# Patient Record
Sex: FEMALE | Race: BLACK OR AFRICAN AMERICAN | NOT HISPANIC OR LATINO | Employment: FULL TIME | URBAN - METROPOLITAN AREA
[De-identification: names, ages, dates, MRNs, and addresses within clinical notes are randomized per-mention and may not be internally consistent; named-entity substitution may affect disease eponyms.]

---

## 2017-01-02 ENCOUNTER — PATIENT MESSAGE (OUTPATIENT)
Dept: INTERNAL MEDICINE | Facility: CLINIC | Age: 42
End: 2017-01-02

## 2017-01-02 ENCOUNTER — PATIENT MESSAGE (OUTPATIENT)
Dept: OBSTETRICS AND GYNECOLOGY | Facility: CLINIC | Age: 42
End: 2017-01-02

## 2017-01-25 ENCOUNTER — PATIENT MESSAGE (OUTPATIENT)
Dept: GASTROENTEROLOGY | Facility: CLINIC | Age: 42
End: 2017-01-25

## 2017-01-25 ENCOUNTER — PATIENT MESSAGE (OUTPATIENT)
Dept: INTERNAL MEDICINE | Facility: CLINIC | Age: 42
End: 2017-01-25

## 2017-01-26 ENCOUNTER — PATIENT MESSAGE (OUTPATIENT)
Dept: GASTROENTEROLOGY | Facility: CLINIC | Age: 42
End: 2017-01-26

## 2017-01-26 DIAGNOSIS — K59.09 CHRONIC CONSTIPATION: ICD-10-CM

## 2017-01-26 RX ORDER — METFORMIN HYDROCHLORIDE 750 MG/1
750 TABLET, EXTENDED RELEASE ORAL 2 TIMES DAILY WITH MEALS
Qty: 180 TABLET | Refills: 3 | Status: SHIPPED | OUTPATIENT
Start: 2017-01-26 | End: 2022-05-18

## 2017-01-26 RX ORDER — LEVOTHYROXINE SODIUM 100 UG/1
100 TABLET ORAL DAILY
Qty: 90 TABLET | Refills: 1 | Status: SHIPPED | OUTPATIENT
Start: 2017-01-26 | End: 2023-03-23

## 2018-06-22 DIAGNOSIS — Z12.39 BREAST CANCER SCREENING: ICD-10-CM

## 2019-06-22 ENCOUNTER — TRANSCRIPTION ENCOUNTER (OUTPATIENT)
Age: 44
End: 2019-06-22

## 2019-06-29 ENCOUNTER — TRANSCRIPTION ENCOUNTER (OUTPATIENT)
Age: 44
End: 2019-06-29

## 2020-12-17 LAB
HUMAN PAPILLOMAVIRUS (HPV): NORMAL
PAP RECOMMENDATION EXT: NORMAL
PAP SMEAR: NORMAL

## 2021-04-28 ENCOUNTER — PATIENT MESSAGE (OUTPATIENT)
Dept: RESEARCH | Facility: HOSPITAL | Age: 46
End: 2021-04-28

## 2021-07-26 ENCOUNTER — HISTORICAL (OUTPATIENT)
Dept: ADMINISTRATIVE | Facility: HOSPITAL | Age: 46
End: 2021-07-26

## 2021-09-30 ENCOUNTER — HISTORICAL (OUTPATIENT)
Dept: LAB | Facility: HOSPITAL | Age: 46
End: 2021-09-30

## 2021-09-30 LAB
ABS NEUT (OLG): 1.91 X10(3)/MCL (ref 2.1–9.2)
ALBUMIN SERPL-MCNC: 3.9 GM/DL (ref 3.5–5)
ALBUMIN/GLOB SERPL: 1.2 RATIO (ref 1.1–2)
ALP SERPL-CCNC: 48 UNIT/L (ref 40–150)
ALT SERPL-CCNC: 33 UNIT/L (ref 0–55)
APPEARANCE, UA: ABNORMAL
AST SERPL-CCNC: 18 UNIT/L (ref 5–34)
BACTERIA SPEC CULT: ABNORMAL /HPF
BASOPHILS # BLD AUTO: 0.03 X10(3)/MCL (ref 0–0.2)
BASOPHILS NFR BLD AUTO: 0.6 % (ref 0–1)
BILIRUB SERPL-MCNC: 1.2 MG/DL (ref 0.2–1.2)
BILIRUB UR QL STRIP: NEGATIVE
BILIRUBIN DIRECT+TOT PNL SERPL-MCNC: 0.3 MG/DL (ref 0–0.5)
BILIRUBIN DIRECT+TOT PNL SERPL-MCNC: 0.9 MG/DL (ref 0–0.8)
BUN SERPL-MCNC: 8 MG/DL (ref 7–18.7)
CALCIUM SERPL-MCNC: 9.4 MG/DL (ref 8.4–10.2)
CHLORIDE SERPL-SCNC: 104 MMOL/L (ref 98–107)
CO2 SERPL-SCNC: 25 MMOL/L (ref 22–29)
COLOR UR: YELLOW
CREAT SERPL-MCNC: 0.85 MG/DL (ref 0.57–1.11)
EOSINOPHIL # BLD AUTO: 0.2 X10(3)/MCL (ref 0–0.9)
EOSINOPHIL NFR BLD AUTO: 4 % (ref 0–6.4)
ERYTHROCYTE [DISTWIDTH] IN BLOOD BY AUTOMATED COUNT: 13.1 % (ref 11.5–17)
EST. AVERAGE GLUCOSE BLD GHB EST-MCNC: 122.6 MG/DL
GLOBULIN SER-MCNC: 3.2 GM/DL (ref 2.4–3.5)
GLUCOSE (UA): NEGATIVE
GLUCOSE SERPL-MCNC: 108 MG/DL (ref 74–100)
HBA1C MFR BLD: 5.9 %
HCT VFR BLD AUTO: 41.9 % (ref 37–47)
HGB BLD-MCNC: 13.4 GM/DL (ref 12–16)
HGB UR QL STRIP: ABNORMAL
IMM GRANULOCYTES # BLD AUTO: 0.01 10*3/UL (ref 0–0.02)
IMM GRANULOCYTES NFR BLD AUTO: 0.2 % (ref 0–0.43)
KETONES UR QL STRIP: NEGATIVE
LEUKOCYTE ESTERASE UR QL STRIP: ABNORMAL
LYMPHOCYTES # BLD AUTO: 2.49 X10(3)/MCL (ref 0.6–4.6)
LYMPHOCYTES NFR BLD AUTO: 49.9 % (ref 16–44)
MCH RBC QN AUTO: 28.8 PG (ref 27–31)
MCHC RBC AUTO-ENTMCNC: 32 GM/DL (ref 33–36)
MCV RBC AUTO: 89.9 FL (ref 80–94)
MONOCYTES # BLD AUTO: 0.35 X10(3)/MCL (ref 0.1–1.3)
MONOCYTES NFR BLD AUTO: 7 % (ref 4–12.1)
MRSA SCREEN BY PCR: NEGATIVE
NEUTROPHILS # BLD AUTO: 1.91 X10(3)/MCL (ref 2.1–9.2)
NEUTROPHILS NFR BLD AUTO: 38.3 % (ref 43–73)
NITRITE UR QL STRIP: NEGATIVE
NRBC BLD AUTO-RTO: 0 % (ref 0–0.2)
PH UR STRIP: 7 [PH] (ref 5–7)
PLATELET # BLD AUTO: 297 X10(3)/MCL (ref 130–400)
PMV BLD AUTO: 10.7 FL (ref 7.4–10.4)
POTASSIUM SERPL-SCNC: 3.9 MMOL/L (ref 3.5–5.1)
PROT SERPL-MCNC: 7.1 GM/DL (ref 6.4–8.3)
PROT UR QL STRIP: NEGATIVE
RBC # BLD AUTO: 4.66 X10(6)/MCL (ref 4.2–5.4)
RBC #/AREA URNS HPF: ABNORMAL /HPF
SODIUM SERPL-SCNC: 139 MMOL/L (ref 136–145)
SP GR UR STRIP: <=1.005 (ref 1–1.03)
SQUAMOUS EPITHELIAL, UA: ABNORMAL /LPF
UROBILINOGEN UR STRIP-ACNC: NEGATIVE
WBC # SPEC AUTO: 5 X10(3)/MCL (ref 4.5–11.5)
WBC #/AREA URNS HPF: ABNORMAL /HPF

## 2021-10-02 LAB — FINAL CULTURE: NORMAL

## 2021-10-12 ENCOUNTER — HISTORICAL (OUTPATIENT)
Dept: ADMINISTRATIVE | Facility: HOSPITAL | Age: 46
End: 2021-10-12

## 2021-10-25 ENCOUNTER — HISTORICAL (OUTPATIENT)
Dept: LAB | Facility: HOSPITAL | Age: 46
End: 2021-10-25

## 2021-10-25 LAB — SARS-COV-2 AG RESP QL IA.RAPID: NEGATIVE

## 2021-10-26 ENCOUNTER — HOSPITAL ENCOUNTER (OUTPATIENT)
Dept: ORTHOPEDICS | Facility: HOSPITAL | Age: 46
End: 2021-10-27
Attending: SPECIALIST | Admitting: SPECIALIST

## 2021-10-26 LAB
HCT VFR BLD AUTO: 38 % (ref 37–47)
HGB BLD-MCNC: 12.1 GM/DL (ref 12–16)
POC BETA-HCG (QUAL): NEGATIVE

## 2021-10-27 LAB
ABS NEUT (OLG): 3.71 X10(3)/MCL (ref 2.1–9.2)
BUN SERPL-MCNC: 5.4 MG/DL (ref 7–18.7)
CALCIUM SERPL-MCNC: 8.5 MG/DL (ref 8.4–10.2)
CHLORIDE SERPL-SCNC: 108 MMOL/L (ref 98–107)
CO2 SERPL-SCNC: 25 MMOL/L (ref 22–29)
CREAT SERPL-MCNC: 0.82 MG/DL (ref 0.57–1.11)
CREAT/UREA NIT SERPL: 7
ERYTHROCYTE [DISTWIDTH] IN BLOOD BY AUTOMATED COUNT: 13.5 % (ref 11.5–17)
EST CREAT CLEARANCE SER (OHS): 78.62 ML/MIN
GLUCOSE SERPL-MCNC: 112 MG/DL (ref 74–100)
HCT VFR BLD AUTO: 36.9 % (ref 37–47)
HGB BLD-MCNC: 11.4 GM/DL (ref 12–16)
MCH RBC QN AUTO: 27.9 PG (ref 27–31)
MCHC RBC AUTO-ENTMCNC: 30.9 GM/DL (ref 33–36)
MCV RBC AUTO: 90.2 FL (ref 80–94)
NRBC BLD AUTO-RTO: 0 % (ref 0–0.2)
PLATELET # BLD AUTO: 266 X10(3)/MCL (ref 130–400)
PMV BLD AUTO: 10.2 FL (ref 7.4–10.4)
POTASSIUM SERPL-SCNC: 4.1 MMOL/L (ref 3.5–5.1)
RBC # BLD AUTO: 4.09 X10(6)/MCL (ref 4.2–5.4)
SODIUM SERPL-SCNC: 141 MMOL/L (ref 136–145)
WBC # SPEC AUTO: 6.5 X10(3)/MCL (ref 4.5–11.5)

## 2021-11-08 ENCOUNTER — HISTORICAL (OUTPATIENT)
Dept: ADMINISTRATIVE | Facility: HOSPITAL | Age: 46
End: 2021-11-08

## 2022-01-13 LAB — BCS RECOMMENDATION EXT: NORMAL

## 2022-03-02 ENCOUNTER — HISTORICAL (OUTPATIENT)
Dept: ADMINISTRATIVE | Facility: HOSPITAL | Age: 47
End: 2022-03-02

## 2022-03-02 LAB
T4 FREE SERPL-MCNC: 1.06 NG/DL (ref 0.7–1.48)
TSH SERPL-ACNC: 0.2 M[IU]/L (ref 0.35–4.94)

## 2022-03-11 LAB — CRC RECOMMENDATION EXT: NORMAL

## 2022-04-09 ENCOUNTER — HISTORICAL (OUTPATIENT)
Dept: ADMINISTRATIVE | Facility: HOSPITAL | Age: 47
End: 2022-04-09
Payer: COMMERCIAL

## 2022-04-21 ENCOUNTER — HISTORICAL (OUTPATIENT)
Dept: ADMINISTRATIVE | Facility: HOSPITAL | Age: 47
End: 2022-04-21
Payer: COMMERCIAL

## 2022-04-21 LAB
ABS NEUT (OLG): 2.1 (ref 2.1–9.2)
ALBUMIN SERPL-MCNC: 4.1 G/DL (ref 3.5–5)
ALBUMIN/GLOB SERPL: 1.3 {RATIO} (ref 1.1–2)
ALP SERPL-CCNC: 51 U/L (ref 40–150)
ALT SERPL-CCNC: 19 U/L (ref 0–55)
APPEARANCE, UA: NORMAL
AST SERPL-CCNC: 15 U/L (ref 5–34)
BACTERIA SPEC CULT: NORMAL
BASOPHILS # BLD AUTO: 0.05 10*3/UL (ref 0–0.2)
BASOPHILS NFR BLD AUTO: 0.9 % (ref 0–1)
BILIRUB SERPL-MCNC: 0.8 MG/DL (ref 0.2–1.2)
BILIRUB UR QL STRIP.AUTO: NEGATIVE
BILIRUB UR QL STRIP: NEGATIVE
BILIRUBIN DIRECT+TOT PNL SERPL-MCNC: 0.2 (ref 0–0.5)
BILIRUBIN DIRECT+TOT PNL SERPL-MCNC: 0.6 (ref 0–0.8)
BUN SERPL-MCNC: 13.4 MG/DL (ref 7–18.7)
CALCIUM SERPL-MCNC: 9.2 MG/DL (ref 8.4–10.2)
CHLORIDE SERPL-SCNC: 108 MMOL/L (ref 98–107)
CHOLEST SERPL-MCNC: 250 MG/DL
CHOLEST/HDLC SERPL: 5 {RATIO} (ref 0–5)
CO2 SERPL-SCNC: 25 MMOL/L (ref 22–29)
COLOR UR: YELLOW
CREAT SERPL-MCNC: 0.91 MG/DL (ref 0.57–1.11)
DO MICRO?: YES
EOSINOPHIL # BLD AUTO: 0.22 10*3/UL (ref 0–0.9)
EOSINOPHIL NFR BLD AUTO: 3.9 % (ref 0–6.4)
ERYTHROCYTE [DISTWIDTH] IN BLOOD BY AUTOMATED COUNT: 13.3 % (ref 11.5–17)
EST. AVERAGE GLUCOSE BLD GHB EST-MCNC: 125.5 MG/DL
GLOBULIN SER-MCNC: 3.1 G/DL (ref 2.4–3.5)
GLUCOSE (UA): NEGATIVE
GLUCOSE SERPL-MCNC: 111 MG/DL (ref 74–100)
GLUCOSE UR QL STRIP.AUTO: NEGATIVE
HBA1C MFR BLD: 6 %
HCT VFR BLD AUTO: 41.5 % (ref 37–47)
HDLC SERPL-MCNC: 48 MG/DL (ref 40–60)
HEMOLYSIS INTERF INDEX SERPL-ACNC: 1
HGB BLD-MCNC: 13.2 G/DL (ref 12–16)
HGB UR QL STRIP: NORMAL
ICTERIC INTERF INDEX SERPL-ACNC: 1
IMM GRANULOCYTES # BLD AUTO: 0.01 10*3/UL (ref 0–0.02)
IMM GRANULOCYTES NFR BLD AUTO: 0.2 % (ref 0–0.43)
KETONES UR QL STRIP.AUTO: NEGATIVE
KETONES UR QL STRIP: NEGATIVE
LDLC SERPL CALC-MCNC: 188 MG/DL (ref 50–140)
LEUKOCYTE ESTERASE UR QL STRIP.AUTO: NORMAL
LEUKOCYTE ESTERASE UR QL STRIP: NORMAL
LIPEMIC INTERF INDEX SERPL-ACNC: 11
LYMPHOCYTES # BLD AUTO: 2.82 10*3/UL (ref 0.6–4.6)
LYMPHOCYTES NFR BLD AUTO: 50.6 % (ref 16–44)
MANUAL DIFF? (OHS): NO
MCH RBC QN AUTO: 28.2 PG (ref 27–31)
MCHC RBC AUTO-ENTMCNC: 31.8 G/DL (ref 33–36)
MCV RBC AUTO: 88.7 FL (ref 80–94)
MONOCYTES # BLD AUTO: 0.37 10*3/UL (ref 0.1–1.3)
MONOCYTES NFR BLD AUTO: 6.6 % (ref 4–12.1)
MUCOUS THREADS URNS QL MICRO: NORMAL
NEUTROPHILS # BLD AUTO: 2.1 10*3/UL (ref 2.1–9.2)
NEUTROPHILS NFR BLD AUTO: 37.8 % (ref 43–73)
NITRITE UR QL STRIP: NEGATIVE
NRBC BLD AUTO-RTO: 0 % (ref 0–0.2)
PH UR STRIP: 6 [PH] (ref 5–7)
PLATELET # BLD AUTO: 320 10*3/UL (ref 130–400)
PMV BLD AUTO: 10 FL (ref 7.4–10.4)
POTASSIUM SERPL-SCNC: 4 MMOL/L (ref 3.5–5.1)
PROT SERPL-MCNC: 7.2 G/DL (ref 6.4–8.3)
PROT UR QL STRIP.AUTO: NEGATIVE
PROT UR QL STRIP: NEGATIVE
RBC # BLD AUTO: 4.68 10*6/UL (ref 4.2–5.4)
RBC #/AREA URNS HPF: NORMAL /[HPF] (ref 2–5)
RBC UR QL AUTO: NORMAL
SODIUM SERPL-SCNC: 140 MMOL/L (ref 136–145)
SP GR UR STRIP: >=1.03 (ref 1–1.03)
SQUAMOUS EPITHELIAL, UA: NORMAL
T4 FREE SERPL-MCNC: 0.97 NG/DL (ref 0.7–1.48)
TRIGL SERPL-MCNC: 71 MG/DL (ref 0–150)
TSH SERPL-ACNC: 0.27 M[IU]/L (ref 0.35–4.94)
UROBILINOGEN UR STRIP-ACNC: 0.2
UROBILINOGEN UR STRIP-ACNC: NEGATIVE
VLDLC SERPL CALC-MCNC: 14 MG/DL
WBC # SPEC AUTO: 5.6 10*3/UL (ref 4.5–11.5)
WBC #/AREA URNS HPF: NORMAL /[HPF] (ref 2–5)

## 2022-04-27 VITALS
SYSTOLIC BLOOD PRESSURE: 139 MMHG | DIASTOLIC BLOOD PRESSURE: 89 MMHG | BODY MASS INDEX: 37.88 KG/M2 | HEIGHT: 66 IN | WEIGHT: 235.69 LBS

## 2022-04-30 NOTE — DISCHARGE SUMMARY
Patient:   Maria E Lopez             MRN: [id number]            FIN: [id number]               Age:   46 years     Sex:  Female     :  1975   Associated Diagnoses:   Hashimoto thyroiditis; Hypothyroidism; Impaired gait and mobility; Osteoarthritis of right knee   Author:   Conrad Pope      Discharge Information      Discharge Summary Information   Discharged  10/27/2021   Admitting physician     Jorge A Kingsley MD.     Discharge diagnosis     Hashimoto thyroiditis (HQD95-NA E06.3).     Hypothyroidism (SYM32-QA E03.9).     Impaired gait and mobility (FFI11-BP R26.89).     Osteoarthritis of right knee (AKL80-FZ M17.11).     Discharge medications     OTHER MEDICATIONS (Selected)   Inpatient Medications  Completed  Ancef 2gm/50ml D5W (Premix): 2 gm, form: Infusion Surgical prophylaxis, IV Piggyback, On Call, Infuse over: 30 minute(s), first dose 10/26/21 5:19:00 CDT, if patient > or = 80kg give 2 gm dose OR If patient > or = 120kg give 3 gm dose.  Ancef 2gm/50ml D5W (Premix): 2 gm, form: Infusion, Surgical prophylaxis, IV Piggyback, q6hr, Infuse over: 30 minute(s), Order duration: 3 dose(s), first dose 10/26/21 14:00:00 CDT, stop date 10/27/21 7:59:00 CDT, Last dose within 24 hours after surgery.  Neurontin 300 mg oral capsule: 300 mg, form: Cap, Oral, Once, first dose 10/27/21 9:00:00 CDT, stop date 10/27/21 9:00:00 CDT  Neurontin 300 mg oral capsule: 600 mg, form: Cap, Oral, On Call, Order duration: 1 dose(s), first dose 10/26/21 5:19:00 CDT  Ofirmev 10 mg/mL intravenous solution: 1,000 mg, form: Infusion, IV Piggyback, q6hr, Infuse over: 15 minute(s), Order duration: 1 dose(s), first dose 10/26/21 13:00:00 CDT, stop date 10/26/21 18:59:00 CDT, Post op  Reglan: 10 mg, form: Injection, IV, Once, first dose 10/27/21 11:03:00 CDT, stop date 10/27/21 11:03:00 CDT, NOW  Toradol 10 mg oral tablet: 10 mg, form: Tab, Oral, On Call, Order duration: 1 dose(s), first dose 10/26/21 5:19:00 CDT, if Celebrex is  contraindicated (Sulfa allergy)  Toradol: 15 mg, form: Injection, IV Push, q6hr, first dose 10/26/21 11:00:00 CDT, stop date 10/27/21 14:59:00 CDT  Zofran ORAL tab: 4 mg, form: Tab-Dis, Oral, On Call, Order duration: 1 dose(s), first dose 10/26/21 6:19:00 CDT  acetaminophen: 1,000 mg, form: Tab, Oral, On Call, Order duration: 1 dose(s), first dose 10/26/21 5:19:00 CDT, 1 hr before surgery  Discontinued  Buffered Lidocaine 1% - 1mL syringe: 0.5 mL, 5 mg =, form: Injection, ID, As Directed PRN for other (see comment), first dose 10/26/21 5:23:00 CDT, May inject 0.5mL at IV site, if not allergic  Dextrose 50% and Water  (50 mL vial/syringe): 25 mL, 12.5 gm =, form: Injection, IV Push, As Directed PRN for blood glucose, Infuse over: 5 minute(s), first dose 10/26/21 8:11:00 CDT, Unconscious patient: Repeat as ordered per protocol.  Dextrose 50% and Water  (50 mL vial/syringe): 25 mL, 12.5 gm =, form: Injection, IV Push, Once PRN for blood glucose, Infuse over: 5 minute(s), first dose 10/26/21 8:11:00 CDT, Unconscious patient: Look for other source of altered mental status.  Dextrose 50% and Water  (50 mL vial/syringe): 50 mL, 25 gm =, form: Injection, IV Push, As Directed PRN for blood glucose, Infuse over: 5 minute(s), first dose 10/26/21 8:11:00 CDT, Unconscious patient: Repeat as ordered per protocol.  Dextrose 50% in Water intravenous solution: 25 mL, 12.5 gm =, form: Injection, IV Push, As Directed PRN for blood glucose, Infuse over: 5 minute(s), first dose 10/26/21 8:11:00 CDT, Conscious patient.  Dilaudid: 0.5 mg, form: Injection, IV Push, q5min PRN for pain, Order duration: 8 dose(s), first dose 10/26/21 8:13:00 CDT, stop date Limited # of times, moderate/severe.  May repeat every 5 minutes until patient reaches pain level of mild or less, not to excee...  Dulcolax Laxative 10 mg RECTAL suppository: 10 mg, form: Supp, CT (rectal), Daily PRN for constipation, first dose 10/26/21 8:11:00 CDT, give in unrelieved  with MOM and Miralax  Milk of Magnesia: 30 mL, form: Susp, Oral, Daily PRN for constipation, first dose 10/26/21 8:11:00 CDT  MiraLax (polyethylene glycol 3350): 17 gm, form: Powder-Recon, Oral, Daily, first dose 10/26/21 9:00:00 CDT  Pepcid: 20 mg, form: Tab, Oral, Daily, first dose 10/26/21 9:00:00 CDT  Percocet 5/325: 1 tab(s), form: Tab, Oral, q4hr PRN for pain, first dose 10/26/21 8:11:00 CDT, Pain score 6-10  Phenergan: 6.25 mg, form: Injection, IM, Once PRN for nausea, first dose 10/26/21 8:13:00 CDT, Do NOT give if patient > 60 years old, pregnant, or lactacting  Plasmalyte 1,000 mL: 1,000 mL, 1,000 mL, IV, Once-Unscheduled, 250 mL/hr, start date 10/26/21 5:19:00 CDT, 2.15, m2  Restoril 15 mg oral capsule: 15 mg, form: Cap, Oral, At Bedtime PRN for insomnia, first dose 10/26/21 8:11:00 CDT  Robaxin 750 mg oral tablet: 750 mg, form: Tab, Oral, q8hr PRN for spasm, first dose 10/26/21 8:11:00 CDT  Senokot S 50 mg-8.6 mg oral tablet: 2 tab(s), form: Tab, Oral, BID, first dose 10/26/21 9:00:00 CDT  Sodium Chloride 0.9% intravenous solution 1,000 mL: 1,000 mL, 1,000 mL, IV, 125 mL/hr, start date 10/26/21 8:11:00 CDT, 2.12, m2  Sodium Chloride 0.9% intravenous solution 1,000 mL: 1,000 mL, 1,000 mL, IV, 20 mL/hr, start date 10/26/21 5:23:00 CDT, 2.15, m2  Sodium Chloride 0.9% intravenous solution 1,000 mL: 1,000 mL, 1,000 mL, IV, 20 mL/hr, start date 10/26/21 8:13:00 CDT, 2.12, m2  Ultram: 50 mg, form: Tab, Oral, q4hr PRN for pain, first dose 10/26/21 8:11:00 CDT, Pain Score 1-5  Zofran ODT 4 mg oral tablet, disintegratin mg, form: Tab-Dis, Oral, q6hr PRN for nausea/vomiting, first dose 10/26/21 8:11:00 CDT, if tolerating PO intake  Zofran: 4 mg, form: Injection, IV Push, Once-Unscheduled PRN for nausea, first dose 10/26/21 8:13:00 CDT  Zofran: 4 mg, form: Injection, IV Push, q6hr PRN for nausea/vomiting, first dose 10/26/21 8:11:00 CDT, If unable to tolerate PO  ascorbic acid: 500 mg, form: Tab, Oral, At  Bedtime, first dose 10/26/21 21:00:00 CDT  aspirin 81 mg oral Delayed Release (EC) tablet: 81 mg, form: Tab-EC, Oral, BID, first dose 10/27/21 9:00:00 CDT  glucagon: 1 mg, form: Injection, IM, q10min PRN for blood glucose, first dose 10/26/21 8:11:00 CDT, Conscious Patient with NO IV access available and BG < 45 mg/dl.  glucagon: 1 mg, form: Injection, IM, q10min PRN for blood glucose, first dose 10/26/21 8:11:00 CDT, Unconscious patient: Patient with NO IV access available and BG < 70 mg/dl.  hydrALAZINE (Apresoline) Inj.: 10 mg, form: Injection, IV Push, q4hr PRN for hypertension, first dose 10/26/21 8:11:00 CDT, SBP >160, DBP > 90  insulin lispro: 2-14 units, form: Soln, Subcutaneous, As Directed PRN for blood glucose, first dose 10/26/21 8:11:00 CDT  levothyroxine 100 mcg (0.1 mg) oral tablet: 100 mcg, form: Tab, Oral, Daily, first dose 10/27/21 6:00:00 CDT  liothyronine: 5 mcg, form: Tab, Oral, Daily, first dose 10/26/21 9:00:00 CDT  midazolam: 1 mg, form: Injection, IV Push, q5min PRN for anxiety, Order duration: 2 dose(s), first dose 10/26/21 5:23:00 CDT, stop date Limited # of times, may repeat x1 in 5 minutes if anxiety not relieved.  Hold  if pregnancy test is pending. MAX Dose of 5 mg...  morphine: 4 mg, form: Injection, IV Push, q3hr PRN for breakthru pain, first dose 10/26/21 8:11:00 CDT, pain score 7-10 after PO meds  morphine: 4 mg, form: Injection, IV Push, q5min PRN for pain, Order duration: 5 dose(s), first dose 10/26/21 8:13:00 CDT, stop date Limited # of times, moderate/severe; may repeat every 5 minutes until patient reaches pain level or mild or less, not to exceed 1...  simethicone 80 mg Chew Tab ( Mylanta Gas ): 160 mg, form: Tab-Chew, Oral, TID PRN for gas, first dose 10/27/21 11:35:00 CDT, STAT  Prescriptions  Prescribed  Linzess 145 mcg oral capsule: 145 mcg = 1 cap(s), Oral, Daily, PRN PRN constipation, # 30 cap(s), 0 Refill(s), Pharmacy: [facility], 167, cm, Height/Length  Dosing, 10/26/21 5:29:00 CDT, 105.2, kg, Weight Dosing, 10/26/21 5:29:00 CDT  Robaxin 750 mg oral tablet: 750 mg = 1 tab(s), Oral, q6hr, PRN PRN spasm, X 14 day(s), # 56 tab(s), 0 Refill(s), Pharmacy: [facility], 167, cm, Height/Length Dosing, 10/26/21 5:29:00 CDT, 105.2, kg, Weight Dosing, 10/26/21 5:29:00 CDT  Toradol 10 mg oral tablet: 10 mg = 1 tab(s), Oral, q8hr, not to exceed 40 mg/day and 5 days duration for all dose forms, X 5 day(s), # 15 tab(s), 0 Refill(s), Pharmacy: [facility], 167, cm, Height/Length Dosing, 10/26/21 5:29:00 CDT, 105.2, kg, Weight Dosing, 10/26/21...  Ultram 50 mg oral tablet: 50 mg = 1 tab(s), Oral, q4hr, PRN PRN pain, X 7 day(s), # 42 tab(s), 0 Refill(s), Pharmacy: [facility], 167, cm, Height/Length Dosing, 10/26/21 5:29:00 CDT, 105.2, kg, Weight Dosing, 10/26/21 5:29:00 CDT  aspirin 81 mg oral Delayed Release (EC) tablet: 81 mg = 1 tab(s), Oral, BID, # 84 tab(s), 0 Refill(s), Pharmacy: [facility], 167, cm, Height/Length Dosing, 10/26/21 5:29:00 CDT, 105.2, kg, Weight Dosing, 10/26/21 5:29:00 CDT  Discontinued  Linzess 145 mcg oral capsule: 145 mcg = 1 cap(s), Oral, Daily, PRN PRN constipation, # 14 cap(s), 0 Refill(s), Pharmacy: [facility], 167, cm, Height/Length Dosing, 10/26/21 5:29:00 CDT, 105.2, kg, Weight Dosing, 10/26/21 5:29:00 CDT  Documented Medications  Documented  levothyroxine 100 mcg (0.1 mg) oral tablet: 100 mcg = 1 tab(s), Oral, Daily  liothyronine 5 mcg oral tablet: 5 mcg = 1 tab(s), Oral, Daily  polyethylene glycol 3350 oral powder for reconstitution: 17 gm, Oral, BID, 0 Refill(s)  Completed  Tylenol Extra Strength PM: 1 tab(s), Oral, Once a day (at bedtime), 0 Refill(s)  Discontinued  Aleve 220 mg oral capsule: 220 mg = 1 cap(s), Oral, BID, 0 Refill(s).        Hospital Course     Procedure performed: robotic assisted right medial compartment partial knee arthroplasty  The patient had no complications during her  hospital stay and was discharged home in stable condition full weightbearing with assistance of a walker.  The patient was given a consult for physical therapy and rehab as well as a follow-up appointment in our office in 2 weeks for reevaluation.  The patient was instructed on wound care and dressing change as well as to continue the GEOVANNY hose while at home.  Prescriptions for continuation of the VTE prophylaxis and pain control were given.  The patient´s home medications were resumed please see discharge med rec for that.         Discharge Plan   Discharge Summary Plan   Discharge disposition: discharge to home into the care of family member.     Prescriptions: written and given to patient.

## 2022-04-30 NOTE — OP NOTE
DATE OF SURGERY:    10/26/2021    SURGEON:  Jorge A Kingsley MD  ASSISTANT:  TIGRE Polanco    ASSISTANT ATTESTATION:  Conrad Reyes, Certified Physician Assistant, was necessary and essential for all aspects of the operation including patient positioning, surgical exposure, bony preparation, implantation, and wound closure.    COUNTS:  Lap, needle, and sponge counts correct.    COMPLICATIONS:  None.    ESTIMATED BLOOD LOSS:  Less 20 mL.    PREOPERATIVE DIAGNOSIS:  Medial compartment osteoarthritis, right knee.    POSTOPERATIVE DIAGNOSIS:  Medial compartment osteoarthritis, right knee.    PROCEDURE:  Robotic-assisted right knee medial compartment partial knee arthroplasty.    IMPLANTS:  [place] size 4 femoral component, size 4 tibial component, 8 mm thickness polyethylene insert, tobramycin-impregnated cement.    INDICATIONS:  Ms Lopez is 46 years old with advanced osteoarthritis in which she was bone on bone in the medial compartment of her right knee.  She had a passively correctable varus deformity to neutral.  Risks, benefits, alternatives, and complications of operative and nonoperative treatment were explained to the patient.  She understood, agreed, and wanted to proceed with operative intervention.  Valid informed consent was obtained.    DESCRIPTION OF PROCEDURE:  She was brought to the operating room and placed supine on the operating table, and underwent regional anesthesia.  She was sedated.  Tourniquet was placed on the right thigh.  The usual sterile ChloraPrep scrub and paint, followed by sterile drape was performed.  Ioban dressing was placed.  Esmarch bandage was used to exsanguinate the right lower extremity.  Tourniquet was inflated.  Pins were placed into the femur and the tibia followed by assembly and registration of the femoral and tibial arrays.  Hip center and ankle center registration were performed.  Anteromedial incision was made over the medial compartment.  Skin bleeders were  coagulated with cautery.  The capsulotomy was performed and a self-retaining retractor was placed.  The patient was bone on bone in the medial compartment with a normal patellofemoral articulation and a normal lateral compartment.  The ACL was intact.  The medial meniscus was excised.  The femoral and tibial checkpoints were placed, and registered.  Fine point registration of the femur and the tibia was performed followed by excision of osteophytes and ligament balancing.  When the plane was balanced symmetrically, cartilage mapping was performed.  The plan was finalized and robotic-assisted bony preparation of the femur and the tibia was performed.  A size 4 tibial and femoral trial were placed along with an 8 mm thickness trial insert.  The range of motion was 0 degrees to 125 degrees with excellent balancing and tracking throughout a range of motion.  Trials were removed.  Bone surfaces were irrigated and suctioned and dried.  Tobramycin-impregnated cement was mixed.  Once it was proper consistency, the size 4 tibia and femoral component were cemented into place along with press fit of an 8 mm thickness polyethylene insert.  Excess cement was excised when the cement had cured.  Tourniquet was deflated.  Hemostasis was obtained.  There was no abnormal bleeding.  The knee was irrigated, suctioned, and injected for postoperative pain control.  Range of motion was 0 degrees to 125 degrees with excellent balancing and tracking throughout a range of motion.  The capsule and subcutaneous tissue were injected for postoperative pain control.  The knee was irrigated, suctioned, and then arthrotomy was closed with #1 braided suture followed by reapproximation of skin edges with 2-0 Vicryl suture and surgical staples used for skin closure.  Sterile dressings were applied and the patient was taken to the recovery room in stable condition.        ______________________________  MD MELINDA Barnes/UA  DD:  10/26/2021  Time:   07:51AM  DT:  10/26/2021  Time:  08:08AM  Job #:  [id number]

## 2022-05-16 ENCOUNTER — PATIENT MESSAGE (OUTPATIENT)
Dept: FAMILY MEDICINE | Facility: CLINIC | Age: 47
End: 2022-05-16
Payer: COMMERCIAL

## 2022-05-18 ENCOUNTER — OFFICE VISIT (OUTPATIENT)
Dept: FAMILY MEDICINE | Facility: CLINIC | Age: 47
End: 2022-05-18
Payer: COMMERCIAL

## 2022-05-18 VITALS
DIASTOLIC BLOOD PRESSURE: 90 MMHG | WEIGHT: 217 LBS | BODY MASS INDEX: 34.87 KG/M2 | SYSTOLIC BLOOD PRESSURE: 126 MMHG | OXYGEN SATURATION: 98 % | HEART RATE: 72 BPM | RESPIRATION RATE: 14 BRPM | HEIGHT: 66 IN

## 2022-05-18 DIAGNOSIS — R31.29 MICROSCOPIC HEMATURIA: Primary | ICD-10-CM

## 2022-05-18 DIAGNOSIS — R82.71 BACTERIURIA: ICD-10-CM

## 2022-05-18 PROBLEM — R39.9 UTI SYMPTOMS: Status: ACTIVE | Noted: 2022-05-18

## 2022-05-18 LAB
APPEARANCE UR: CLEAR
BACTERIA #/AREA URNS AUTO: ABNORMAL /HPF
BILIRUB UR QL STRIP.AUTO: NEGATIVE MG/DL
COLOR UR AUTO: YELLOW
GLUCOSE UR QL STRIP.AUTO: NEGATIVE MG/DL
KETONES UR QL STRIP.AUTO: ABNORMAL MG/DL
LEUKOCYTE ESTERASE UR QL STRIP.AUTO: ABNORMAL UNIT/L
NITRITE UR QL STRIP.AUTO: NEGATIVE
PH UR STRIP.AUTO: 6.5 [PH]
PROT UR QL STRIP.AUTO: NEGATIVE MG/DL
RBC #/AREA URNS AUTO: <5 /HPF
RBC UR QL AUTO: ABNORMAL UNIT/L
SP GR UR STRIP.AUTO: 1.01 (ref 1–1.03)
SQUAMOUS #/AREA URNS AUTO: 7 /LPF
UROBILINOGEN UR STRIP-ACNC: 1 MG/DL
WBC #/AREA URNS AUTO: 6 /HPF

## 2022-05-18 PROCEDURE — 99213 PR OFFICE/OUTPT VISIT, EST, LEVL III, 20-29 MIN: ICD-10-PCS | Mod: ,,, | Performed by: NURSE PRACTITIONER

## 2022-05-18 PROCEDURE — 3074F SYST BP LT 130 MM HG: CPT | Mod: CPTII,,, | Performed by: NURSE PRACTITIONER

## 2022-05-18 PROCEDURE — 81001 URINALYSIS AUTO W/SCOPE: CPT | Performed by: NURSE PRACTITIONER

## 2022-05-18 PROCEDURE — 3008F PR BODY MASS INDEX (BMI) DOCUMENTED: ICD-10-PCS | Mod: CPTII,,, | Performed by: NURSE PRACTITIONER

## 2022-05-18 PROCEDURE — 1160F PR REVIEW ALL MEDS BY PRESCRIBER/CLIN PHARMACIST DOCUMENTED: ICD-10-PCS | Mod: CPTII,,, | Performed by: NURSE PRACTITIONER

## 2022-05-18 PROCEDURE — 3080F DIAST BP >= 90 MM HG: CPT | Mod: CPTII,,, | Performed by: NURSE PRACTITIONER

## 2022-05-18 PROCEDURE — 1159F MED LIST DOCD IN RCRD: CPT | Mod: CPTII,,, | Performed by: NURSE PRACTITIONER

## 2022-05-18 PROCEDURE — 3074F PR MOST RECENT SYSTOLIC BLOOD PRESSURE < 130 MM HG: ICD-10-PCS | Mod: CPTII,,, | Performed by: NURSE PRACTITIONER

## 2022-05-18 PROCEDURE — 3080F PR MOST RECENT DIASTOLIC BLOOD PRESSURE >= 90 MM HG: ICD-10-PCS | Mod: CPTII,,, | Performed by: NURSE PRACTITIONER

## 2022-05-18 PROCEDURE — 1159F PR MEDICATION LIST DOCUMENTED IN MEDICAL RECORD: ICD-10-PCS | Mod: CPTII,,, | Performed by: NURSE PRACTITIONER

## 2022-05-18 PROCEDURE — 1160F RVW MEDS BY RX/DR IN RCRD: CPT | Mod: CPTII,,, | Performed by: NURSE PRACTITIONER

## 2022-05-18 PROCEDURE — 99213 OFFICE O/P EST LOW 20 MIN: CPT | Mod: ,,, | Performed by: NURSE PRACTITIONER

## 2022-05-18 PROCEDURE — 3008F BODY MASS INDEX DOCD: CPT | Mod: CPTII,,, | Performed by: NURSE PRACTITIONER

## 2022-05-18 RX ORDER — SYRING-NEEDL,DISP,INSUL,0.3 ML 29 G X1/2"
SYRINGE, EMPTY DISPOSABLE MISCELLANEOUS
COMMUNITY
Start: 2022-02-17 | End: 2022-08-12

## 2022-05-18 RX ORDER — AMLODIPINE BESYLATE 5 MG/1
5 TABLET ORAL DAILY
COMMUNITY
Start: 2022-04-28 | End: 2023-03-23

## 2022-05-18 RX ORDER — LIOTHYRONINE SODIUM 5 UG/1
5 TABLET ORAL DAILY
COMMUNITY
Start: 2022-04-01 | End: 2023-03-23

## 2022-05-18 NOTE — ASSESSMENT & PLAN NOTE
Microscopic hematuria noted   POC urine positive blood today  Will repeat UA with microscopy  Will also repeat urine culture  Will consider Urology referral and/or CT pending results

## 2022-05-18 NOTE — PROGRESS NOTES
Subjective:      Patient ID: Maria E Lopez is a 46 y.o. Black or  female      Chief Complaint: Follow-up (Possible UTI)       Past Medical History:   Diagnosis Date    Chronic constipation     Hashimoto's thyroiditis     Hyperlipidemia     Hypertension     Hypothyroidism     Obesity     Osteoarthritis     Prediabetes         HPI  Presents today for discussion of urine results.  States recent wellness visit on 4/28/2022 in which labs were done; she is here today for further explanation of urine results.  Results revealed microscopic hematuria, WBCs, leukocytes, few bacteria, and moderate squamous epithelial cells.  Urine culture was negative.  Denies any urinary symptoms today.  States concerns for cancer after researching urine results.  Patient denies menstrual cycle at time of results and states she does not have menstrual cycles due to IUD.  Denies any other problems today.      Review of Systems   Constitutional: Negative.  Negative for appetite change and fever.   HENT: Negative.    Eyes: Negative.  Negative for discharge and redness.   Respiratory: Negative.  Negative for shortness of breath.    Cardiovascular: Negative.  Negative for chest pain.   Gastrointestinal: Negative.    Endocrine: Negative.    Genitourinary: Negative.  Negative for decreased urine volume, difficulty urinating, dysuria, flank pain, frequency, hematuria and urgency.   Integumentary:  Negative.   Allergic/Immunologic: Negative.    Neurological: Negative.    Psychiatric/Behavioral: Negative.    All other systems reviewed and are negative.         Objective:      Vitals:    05/18/22 1256   BP: (!) 126/90   Pulse: 72   Resp: 14      Body mass index is 35.02 kg/m².     Physical Exam  Vitals reviewed.   Constitutional:       Appearance: Normal appearance.   HENT:      Head: Normocephalic.      Mouth/Throat:      Mouth: Mucous membranes are moist.   Eyes:      Conjunctiva/sclera: Conjunctivae normal.       Pupils: Pupils are equal, round, and reactive to light.   Cardiovascular:      Rate and Rhythm: Normal rate and regular rhythm.   Pulmonary:      Effort: Pulmonary effort is normal. No respiratory distress.      Breath sounds: Normal breath sounds.   Musculoskeletal:         General: Normal range of motion.      Cervical back: Normal range of motion and neck supple.   Skin:     General: Skin is warm and dry.   Neurological:      Mental Status: She is alert and oriented to person, place, and time.   Psychiatric:         Mood and Affect: Mood normal.            Current Outpatient Medications:     amLODIPine (NORVASC) 5 MG tablet, Take 5 mg by mouth once daily., Disp: , Rfl:     levothyroxine (SYNTHROID) 100 MCG tablet, Take 1 tablet (100 mcg total) by mouth once daily., Disp: 90 tablet, Rfl: 1    linaCLOtide (LINZESS) 145 mcg Cap capsule, Take 145 mcg by mouth., Disp: , Rfl:     liothyronine (CYTOMEL) 5 MCG Tab, Take 5 mcg by mouth once daily., Disp: , Rfl:     magnesium citrate solution, SMARTSI Bottle(s) By Mouth Every Evening, Disp: , Rfl:     Assessment & Plan:     Problem List Items Addressed This Visit        Renal/    Bacteriuria     Microscopic hematuria noted   POC urine positive blood today  Will repeat UA with microscopy  Will also repeat urine culture  Will consider Urology referral and/or CT pending results           Relevant Orders    POCT URINE DIPSTICK WITHOUT MICROSCOPE    Urine culture    Urinalysis, Reflex to Urine Culture Urine, Clean Catch    Microscopic hematuria - Primary     Microscopic hematuria noted   POC urine positive blood today  Will repeat UA with microscopy  Will also repeat urine culture  Will consider Urology referral and/or CT pending results           Relevant Orders    Urine culture    Urinalysis, Reflex to Urine Culture Urine, Clean Catch

## 2022-08-12 ENCOUNTER — OFFICE VISIT (OUTPATIENT)
Dept: FAMILY MEDICINE | Facility: CLINIC | Age: 47
End: 2022-08-12
Payer: COMMERCIAL

## 2022-08-12 VITALS
WEIGHT: 210.63 LBS | HEIGHT: 66 IN | TEMPERATURE: 98 F | OXYGEN SATURATION: 98 % | RESPIRATION RATE: 16 BRPM | SYSTOLIC BLOOD PRESSURE: 121 MMHG | BODY MASS INDEX: 33.85 KG/M2 | HEART RATE: 72 BPM | DIASTOLIC BLOOD PRESSURE: 86 MMHG

## 2022-08-12 DIAGNOSIS — H92.01 RIGHT EAR PAIN: Primary | ICD-10-CM

## 2022-08-12 PROCEDURE — 3008F PR BODY MASS INDEX (BMI) DOCUMENTED: ICD-10-PCS | Mod: CPTII,,, | Performed by: NURSE PRACTITIONER

## 2022-08-12 PROCEDURE — 3079F DIAST BP 80-89 MM HG: CPT | Mod: CPTII,,, | Performed by: NURSE PRACTITIONER

## 2022-08-12 PROCEDURE — 1160F RVW MEDS BY RX/DR IN RCRD: CPT | Mod: CPTII,,, | Performed by: NURSE PRACTITIONER

## 2022-08-12 PROCEDURE — 3074F PR MOST RECENT SYSTOLIC BLOOD PRESSURE < 130 MM HG: ICD-10-PCS | Mod: CPTII,,, | Performed by: NURSE PRACTITIONER

## 2022-08-12 PROCEDURE — 1160F PR REVIEW ALL MEDS BY PRESCRIBER/CLIN PHARMACIST DOCUMENTED: ICD-10-PCS | Mod: CPTII,,, | Performed by: NURSE PRACTITIONER

## 2022-08-12 PROCEDURE — 3074F SYST BP LT 130 MM HG: CPT | Mod: CPTII,,, | Performed by: NURSE PRACTITIONER

## 2022-08-12 PROCEDURE — 3079F PR MOST RECENT DIASTOLIC BLOOD PRESSURE 80-89 MM HG: ICD-10-PCS | Mod: CPTII,,, | Performed by: NURSE PRACTITIONER

## 2022-08-12 PROCEDURE — 1159F MED LIST DOCD IN RCRD: CPT | Mod: CPTII,,, | Performed by: NURSE PRACTITIONER

## 2022-08-12 PROCEDURE — 3008F BODY MASS INDEX DOCD: CPT | Mod: CPTII,,, | Performed by: NURSE PRACTITIONER

## 2022-08-12 PROCEDURE — 99213 OFFICE O/P EST LOW 20 MIN: CPT | Mod: ,,, | Performed by: NURSE PRACTITIONER

## 2022-08-12 PROCEDURE — 99213 PR OFFICE/OUTPT VISIT, EST, LEVL III, 20-29 MIN: ICD-10-PCS | Mod: ,,, | Performed by: NURSE PRACTITIONER

## 2022-08-12 PROCEDURE — 1159F PR MEDICATION LIST DOCUMENTED IN MEDICAL RECORD: ICD-10-PCS | Mod: CPTII,,, | Performed by: NURSE PRACTITIONER

## 2022-08-12 NOTE — PROGRESS NOTES
Subjective:      Patient ID: Maria E Lopez is a 46 y.o. Black or  female      Chief Complaint: Follow-up (Constant pain in ears, gurgling sound when cough)       Past Medical History:   Diagnosis Date    Chronic constipation     Hashimoto's thyroiditis     Hyperlipidemia     Hypertension     Hypothyroidism     Obesity     Osteoarthritis     Prediabetes         HPI  Otalgia   There is pain in the right ear. The current episode started more than 1 month ago (Pain since diagnosis of Covid-19 (June 28th)). The problem has been waxing and waning. There has been no fever. Pertinent negatives include no ear discharge or sore throat. Associated symptoms comments: States crackling sound when she coughs. . She has tried ear drops (Treated with Amoxicillin for [employer] last week of June.  ) for the symptoms. The treatment provided no relief.       Review of Systems   Constitutional: Negative.  Negative for appetite change, chills and fever.   HENT: Positive for ear pain. Negative for ear discharge and sore throat.    Eyes: Negative.  Negative for discharge and redness.   Respiratory: Negative.  Negative for shortness of breath.    Cardiovascular: Negative.  Negative for chest pain.   Gastrointestinal: Negative.    Endocrine: Negative.    Genitourinary: Negative.    Integumentary:  Negative.   Neurological: Negative.    Psychiatric/Behavioral: Negative.    All other systems reviewed and are negative.         Objective:      Vitals:    08/12/22 0947   BP: 121/86   Pulse: 72   Resp: 16   Temp: 98.1 °F (36.7 °C)      Body mass index is 33.99 kg/m².     Physical Exam  Vitals reviewed.   Constitutional:       Appearance: Normal appearance.   HENT:      Head: Normocephalic.      Ears:      Comments: Right ear canal noted with, what appears to be hard cerumen, or foreign body     Mouth/Throat:      Mouth: Mucous membranes are moist.   Eyes:      Conjunctiva/sclera: Conjunctivae normal.      Pupils:  Pupils are equal, round, and reactive to light.   Cardiovascular:      Rate and Rhythm: Normal rate and regular rhythm.   Pulmonary:      Effort: Pulmonary effort is normal. No respiratory distress.      Breath sounds: Normal breath sounds.   Musculoskeletal:         General: Normal range of motion.      Cervical back: Normal range of motion and neck supple.   Skin:     General: Skin is warm and dry.   Neurological:      Mental Status: She is alert and oriented to person, place, and time.   Psychiatric:         Mood and Affect: Mood normal.            Current Outpatient Medications:     amLODIPine (NORVASC) 5 MG tablet, Take 5 mg by mouth once daily., Disp: , Rfl:     levothyroxine (SYNTHROID) 100 MCG tablet, Take 1 tablet (100 mcg total) by mouth once daily., Disp: 90 tablet, Rfl: 1    linaCLOtide (LINZESS) 145 mcg Cap capsule, Take 145 mcg by mouth., Disp: , Rfl:     liothyronine (CYTOMEL) 5 MCG Tab, Take 5 mcg by mouth once daily., Disp: , Rfl:     Assessment & Plan:     Problem List Items Addressed This Visit        ENT    Right ear pain - Primary     Cerumen vs foreign body right ear that is likely causing pain  Debrox OTC prn  Refer to ENT           Relevant Orders    Ambulatory referral/consult to ENT               Prior to the patient's arrival on the same day, I spent (5) minutes reviewing chart. Once in the exam room with the patient, I spent (10 ) minutes in the room with the member performing a history and exam as well as reviewing the test results and recommendations with the patient. After leaving the exam room, I spent an additional (5 ) minutes completing the electronic health record. The total time spent that day caring for the member is (20) minutes, and this time - including the breakdown - is documented in the medical record.

## 2022-10-19 ENCOUNTER — TELEPHONE (OUTPATIENT)
Dept: FAMILY MEDICINE | Facility: CLINIC | Age: 47
End: 2022-10-19
Payer: COMMERCIAL

## 2022-10-19 DIAGNOSIS — R73.03 PREDIABETES: ICD-10-CM

## 2022-10-19 DIAGNOSIS — E03.9 HYPOTHYROIDISM, UNSPECIFIED TYPE: ICD-10-CM

## 2022-10-19 DIAGNOSIS — E78.5 HYPERLIPIDEMIA, UNSPECIFIED HYPERLIPIDEMIA TYPE: Primary | ICD-10-CM

## 2022-10-19 NOTE — TELEPHONE ENCOUNTER
----- Message from Adriana Redd sent at 10/19/2022 11:05 AM CDT -----  Regarding: labs  Caller is requesting to schedule their Lab appointment prior to annual appointment.  Order is not listed in EPIC.  Please enter order and contact patient to schedule.    Name of Caller:pt    Preferred Date and Time of Labs:    Date of [facility] Appointment:11/27    Where would they like the lab performed?    Would the patient rather a call back or a response via [email or web address]? [email or web address]    Best Call Back Number:[phone]    Additional Information:Pt has an upcoming appt and she doesn't have the labs in.

## 2022-10-20 ENCOUNTER — LAB VISIT (OUTPATIENT)
Dept: LAB | Facility: HOSPITAL | Age: 47
End: 2022-10-20
Attending: INTERNAL MEDICINE
Payer: COMMERCIAL

## 2022-10-20 DIAGNOSIS — E78.5 HYPERLIPIDEMIA, UNSPECIFIED HYPERLIPIDEMIA TYPE: ICD-10-CM

## 2022-10-20 DIAGNOSIS — R73.03 PREDIABETES: ICD-10-CM

## 2022-10-20 DIAGNOSIS — E03.9 HYPOTHYROIDISM, UNSPECIFIED TYPE: ICD-10-CM

## 2022-10-20 LAB
CHOLEST SERPL-MCNC: 321 MG/DL
CHOLEST/HDLC SERPL: 5 {RATIO} (ref 0–5)
EST. AVERAGE GLUCOSE BLD GHB EST-MCNC: 116.9 MG/DL
HBA1C MFR BLD: 5.7 %
HDLC SERPL-MCNC: 63 MG/DL (ref 35–60)
LDLC SERPL CALC-MCNC: 240 MG/DL (ref 50–140)
T4 FREE SERPL-MCNC: 0.87 NG/DL (ref 0.7–1.48)
TRIGL SERPL-MCNC: 89 MG/DL (ref 37–140)
TSH SERPL-ACNC: 1.14 UIU/ML (ref 0.35–4.94)
VLDLC SERPL CALC-MCNC: 18 MG/DL

## 2022-10-20 PROCEDURE — 83036 HEMOGLOBIN GLYCOSYLATED A1C: CPT

## 2022-10-20 PROCEDURE — 84443 ASSAY THYROID STIM HORMONE: CPT

## 2022-10-20 PROCEDURE — 36415 COLL VENOUS BLD VENIPUNCTURE: CPT

## 2022-10-20 PROCEDURE — 80061 LIPID PANEL: CPT

## 2022-10-20 PROCEDURE — 84439 ASSAY OF FREE THYROXINE: CPT

## 2022-10-27 ENCOUNTER — TELEPHONE (OUTPATIENT)
Dept: FAMILY MEDICINE | Facility: CLINIC | Age: 47
End: 2022-10-27

## 2022-10-27 ENCOUNTER — OFFICE VISIT (OUTPATIENT)
Dept: FAMILY MEDICINE | Facility: CLINIC | Age: 47
End: 2022-10-27
Payer: COMMERCIAL

## 2022-10-27 VITALS
DIASTOLIC BLOOD PRESSURE: 79 MMHG | TEMPERATURE: 98 F | HEIGHT: 66 IN | HEART RATE: 81 BPM | WEIGHT: 218.88 LBS | OXYGEN SATURATION: 99 % | BODY MASS INDEX: 35.18 KG/M2 | SYSTOLIC BLOOD PRESSURE: 111 MMHG | RESPIRATION RATE: 12 BRPM

## 2022-10-27 DIAGNOSIS — Z00.00 WELLNESS EXAMINATION: ICD-10-CM

## 2022-10-27 DIAGNOSIS — I10 HYPERTENSION, UNSPECIFIED TYPE: Primary | ICD-10-CM

## 2022-10-27 DIAGNOSIS — K59.09 CHRONIC CONSTIPATION: ICD-10-CM

## 2022-10-27 DIAGNOSIS — R31.29 MICROSCOPIC HEMATURIA: ICD-10-CM

## 2022-10-27 DIAGNOSIS — E66.01 SEVERE OBESITY (BMI 35.0-39.9) WITH COMORBIDITY: ICD-10-CM

## 2022-10-27 DIAGNOSIS — Z23 NEED FOR VACCINATION: ICD-10-CM

## 2022-10-27 DIAGNOSIS — Z12.31 ENCOUNTER FOR SCREENING MAMMOGRAM FOR BREAST CANCER: ICD-10-CM

## 2022-10-27 DIAGNOSIS — E78.5 HYPERLIPIDEMIA, UNSPECIFIED HYPERLIPIDEMIA TYPE: ICD-10-CM

## 2022-10-27 DIAGNOSIS — Z12.4 CERVICAL CANCER SCREENING: ICD-10-CM

## 2022-10-27 DIAGNOSIS — R73.03 PREDIABETES: ICD-10-CM

## 2022-10-27 DIAGNOSIS — E03.9 ACQUIRED HYPOTHYROIDISM: ICD-10-CM

## 2022-10-27 PROBLEM — R82.71 BACTERIURIA: Status: RESOLVED | Noted: 2022-05-18 | Resolved: 2022-10-27

## 2022-10-27 PROBLEM — M17.11 OSTEOARTHRITIS OF RIGHT KNEE: Status: ACTIVE | Noted: 2022-10-27

## 2022-10-27 PROBLEM — H92.01 RIGHT EAR PAIN: Status: RESOLVED | Noted: 2022-08-12 | Resolved: 2022-10-27

## 2022-10-27 PROBLEM — R39.9 UTI SYMPTOMS: Status: RESOLVED | Noted: 2022-05-18 | Resolved: 2022-10-27

## 2022-10-27 PROCEDURE — 90686 FLU VACCINE (QUAD) GREATER THAN OR EQUAL TO 3YO PRESERVATIVE FREE IM: ICD-10-PCS | Mod: ,,, | Performed by: INTERNAL MEDICINE

## 2022-10-27 PROCEDURE — 1160F PR REVIEW ALL MEDS BY PRESCRIBER/CLIN PHARMACIST DOCUMENTED: ICD-10-PCS | Mod: CPTII,,, | Performed by: INTERNAL MEDICINE

## 2022-10-27 PROCEDURE — 3074F SYST BP LT 130 MM HG: CPT | Mod: CPTII,,, | Performed by: INTERNAL MEDICINE

## 2022-10-27 PROCEDURE — 1159F PR MEDICATION LIST DOCUMENTED IN MEDICAL RECORD: ICD-10-PCS | Mod: CPTII,,, | Performed by: INTERNAL MEDICINE

## 2022-10-27 PROCEDURE — 90471 IMMUNIZATION ADMIN: CPT | Mod: ,,, | Performed by: INTERNAL MEDICINE

## 2022-10-27 PROCEDURE — 99214 OFFICE O/P EST MOD 30 MIN: CPT | Mod: 25,,, | Performed by: INTERNAL MEDICINE

## 2022-10-27 PROCEDURE — 90686 IIV4 VACC NO PRSV 0.5 ML IM: CPT | Mod: ,,, | Performed by: INTERNAL MEDICINE

## 2022-10-27 PROCEDURE — 3078F DIAST BP <80 MM HG: CPT | Mod: CPTII,,, | Performed by: INTERNAL MEDICINE

## 2022-10-27 PROCEDURE — 99214 PR OFFICE/OUTPT VISIT, EST, LEVL IV, 30-39 MIN: ICD-10-PCS | Mod: 25,,, | Performed by: INTERNAL MEDICINE

## 2022-10-27 PROCEDURE — 90471 FLU VACCINE (QUAD) GREATER THAN OR EQUAL TO 3YO PRESERVATIVE FREE IM: ICD-10-PCS | Mod: ,,, | Performed by: INTERNAL MEDICINE

## 2022-10-27 PROCEDURE — 1160F RVW MEDS BY RX/DR IN RCRD: CPT | Mod: CPTII,,, | Performed by: INTERNAL MEDICINE

## 2022-10-27 PROCEDURE — 3078F PR MOST RECENT DIASTOLIC BLOOD PRESSURE < 80 MM HG: ICD-10-PCS | Mod: CPTII,,, | Performed by: INTERNAL MEDICINE

## 2022-10-27 PROCEDURE — 1159F MED LIST DOCD IN RCRD: CPT | Mod: CPTII,,, | Performed by: INTERNAL MEDICINE

## 2022-10-27 PROCEDURE — 3074F PR MOST RECENT SYSTOLIC BLOOD PRESSURE < 130 MM HG: ICD-10-PCS | Mod: CPTII,,, | Performed by: INTERNAL MEDICINE

## 2022-10-27 RX ORDER — ROSUVASTATIN CALCIUM 20 MG/1
20 TABLET, COATED ORAL DAILY
Qty: 90 TABLET | Refills: 3 | Status: SHIPPED | OUTPATIENT
Start: 2022-10-27 | End: 2023-08-07 | Stop reason: SDUPTHER

## 2022-10-27 NOTE — TELEPHONE ENCOUNTER
Referral sent to Dr. Webb's office today. They close at 12pm. We will try to call her office at a later date. Zachary

## 2022-10-27 NOTE — TELEPHONE ENCOUNTER
GYN referral to Dr. Montse Webb was placed 6 months ago, patient tells me she has not yet heard back from them, new referral entered today as well- please call their office to ask them to schedule appt, if they do not accept her insurance, we can send referral to another female OB/GYN  thanks

## 2022-10-27 NOTE — PROGRESS NOTES
Subjective:      Patient ID: Maria E Lopez is a 47 y.o. female.    Chief Complaint: Follow-up (6 mth f/u)    HPI  Last wellness: 4/28/2022    Patient moved with her 12 year old daughter to [place] from [place].  Prior to now, she was seeing endocrine MD and other's at [facility].  She has two older daughters employed living in [place]. She is . Empolyed at [employer]. No acute concerns  She did try [employer]- lost about 20# but then gained 6 pounds back  Labs done prior to appt and she has seen them on portal    HTN: compliant with Norvasc 5 mg daily  Hypothyroidism:  -every 3 years US for nodules  -stable with medication  -asx  Chronic Constipation:  -problem since she was 25 year old  -no GI evaluation  -no blood in stool  -Linzess was better for her, would like Rx if possible  HLD: elevated today despite dietary changes  Severe Obesity with Comorbidity, HLD: we have discussed patient weight, dietary habits and exercise- she is motivated to lose weight and change nutrition habit-she is working on dietary changes similar to [employer] system, she is bike riding for exercise  Microscopic Hematuria: last UA was < 5 rbc's we discussed if this was higher how we would order add'l workup, but not for now    Surgery:  R knee partial knee replacement: 2021, Dr. Kingsley    Mammogram: 1/13/2022 [facility] in chart- negative for malignancy  PAP smear: 1/2022 normal, request record   *also referral to local GYN placed, Dr. Webb  Colon cancer screening: Dr. Elvis Donaldson 3/11/2022 In chart- no evidence of colon cancer- repeat in 10 yaers 3/11/2032  Flu: 1/27/22  COVID 19: completed 3 doses          Health Maintenance Due   Topic Date Due    COVID-19 Vaccine (4 - Booster for Moderna series) 02/15/2022    Influenza Vaccine (1) 09/01/2022     Review of Systems   Constitutional:  Negative for chills and fever.   HENT:  Negative for congestion, sinus pressure and sore throat.   "  Respiratory:  Negative for cough and shortness of breath.    Cardiovascular:  Negative for chest pain and palpitations.   Gastrointestinal:  Negative for abdominal pain and nausea.   Skin:  Negative for rash.   A comprehensive ROS was performed and is negative except as noted above.  Objective:     Vitals:    10/27/22 0809   BP: 111/79   BP Location: Left arm   Patient Position: Sitting   BP Method: Large (Automatic)   Pulse: 81   Resp: 12   Temp: 97.7 °F (36.5 °C)   TempSrc: Temporal   SpO2: 99%   Weight: 99.3 kg (218 lb 14.4 oz)   Height: 5' 6" (1.676 m)     Physical Exam  Vitals and nursing note reviewed.   Constitutional:       Appearance: Normal appearance.   HENT:      Head: Normocephalic and atraumatic.   Neurological:      Mental Status: She is alert and oriented to person, place, and time.   Psychiatric:         Behavior: Behavior normal.     Assessment/Plan:     1. Hypertension, unspecified type  -     Comprehensive Metabolic Panel; Future; Expected date: 04/27/2023  -     Lipid Panel; Future; Expected date: 04/27/2023  -     CBC Auto Differential; Future; Expected date: 04/27/2023  -     Hemoglobin A1C; Future; Expected date: 04/27/2023  -     Urinalysis; Future; Expected date: 04/27/2023  -     TSH; Future; Expected date: 04/27/2023  Stable, ccm  2. Hyperlipidemia, unspecified hyperlipidemia type  -     Comprehensive Metabolic Panel; Future; Expected date: 04/27/2023  -     Lipid Panel; Future; Expected date: 04/27/2023  -     CBC Auto Differential; Future; Expected date: 04/27/2023  -     Hemoglobin A1C; Future; Expected date: 04/27/2023  -     Urinalysis; Future; Expected date: 04/27/2023  -     TSH; Future; Expected date: 04/27/2023  Unstable  Start rosuvastatin 20 mg po daily  Low fat diet reviewed  3. Microscopic hematuria  -     Comprehensive Metabolic Panel; Future; Expected date: 04/27/2023  -     Lipid Panel; Future; Expected date: 04/27/2023  -     CBC Auto Differential; Future; Expected " date: 04/27/2023  -     Hemoglobin A1C; Future; Expected date: 04/27/2023  -     Urinalysis; Future; Expected date: 04/27/2023  -     TSH; Future; Expected date: 04/27/2023  UA in 6 mos  4. Acquired hypothyroidism  -     Comprehensive Metabolic Panel; Future; Expected date: 04/27/2023  -     Lipid Panel; Future; Expected date: 04/27/2023  -     CBC Auto Differential; Future; Expected date: 04/27/2023  -     Hemoglobin A1C; Future; Expected date: 04/27/2023  -     Urinalysis; Future; Expected date: 04/27/2023  -     TSH; Future; Expected date: 04/27/2023  Stable, ccm  5. Prediabetes  -     Comprehensive Metabolic Panel; Future; Expected date: 04/27/2023  -     Lipid Panel; Future; Expected date: 04/27/2023  -     CBC Auto Differential; Future; Expected date: 04/27/2023  -     Hemoglobin A1C; Future; Expected date: 04/27/2023  -     Urinalysis; Future; Expected date: 04/27/2023  -     TSH; Future; Expected date: 04/27/2023  A1c 5.7; reviewed dietary changes  6. Chronic constipation  -     Comprehensive Metabolic Panel; Future; Expected date: 04/27/2023  -     Lipid Panel; Future; Expected date: 04/27/2023  -     CBC Auto Differential; Future; Expected date: 04/27/2023  -     Hemoglobin A1C; Future; Expected date: 04/27/2023  -     Urinalysis; Future; Expected date: 04/27/2023  -     TSH; Future; Expected date: 04/27/2023  stable  7. Encounter for screening mammogram for breast cancer  -     Mammo Digital Screening Bilat w/ Alfredo; Future; Expected date: 01/16/2023    8. Cervical cancer screening  -     Ambulatory referral/consult to Gynecology; Future; Expected date: 11/03/2022  Patient has not yet heard from local GYN- follow up on this delay  9. Wellness examination  -     Comprehensive Metabolic Panel; Future; Expected date: 04/27/2023  -     Lipid Panel; Future; Expected date: 04/27/2023  -     CBC Auto Differential; Future; Expected date: 04/27/2023  -     Hemoglobin A1C; Future; Expected date: 04/27/2023  -      Urinalysis; Future; Expected date: 04/27/2023  -     TSH; Future; Expected date: 04/27/2023    10. Need for vaccination  -     Influenza - Quadrivalent *Preferred* (6 months+) (PF)    11. Severe obesity (BMI 35.0-39.9) with comorbidity  Weight reviewed  Work on low fat diet, similar to optavia discussed today, which is lower in carbs as well, we discussed trying to focus on healthy nutrition and making better choices as we approach the holidays  She is exercising on her bike, encouraged her to stay active as well as this will aid in weight loss  She is very motivated and has great insight on her goals    Other orders  -     rosuvastatin (CRESTOR) 20 MG tablet; Take 1 tablet (20 mg total) by mouth once daily.  Dispense: 90 tablet; Refill: 3     Follow up in about 6 months (around 4/27/2023) for Annual Wellness-labs due before .

## 2022-11-01 NOTE — TELEPHONE ENCOUNTER
Called Dr Webb's office and spoke with Taylor  She stated referral was received   They called pt to schedule   Pt did not answer but a voicemail was left

## 2022-11-08 ENCOUNTER — DOCUMENTATION ONLY (OUTPATIENT)
Dept: FAMILY MEDICINE | Facility: CLINIC | Age: 47
End: 2022-11-08
Payer: COMMERCIAL

## 2022-11-09 ENCOUNTER — HOSPITAL ENCOUNTER (OUTPATIENT)
Dept: RADIOLOGY | Facility: CLINIC | Age: 47
Discharge: HOME | End: 2022-11-09
Attending: SPECIALIST
Payer: COMMERCIAL

## 2022-11-09 ENCOUNTER — OFFICE VISIT (OUTPATIENT)
Dept: ORTHOPEDICS | Facility: CLINIC | Age: 47
End: 2022-11-09
Payer: COMMERCIAL

## 2022-11-09 VITALS
WEIGHT: 216 LBS | SYSTOLIC BLOOD PRESSURE: 123 MMHG | BODY MASS INDEX: 34.72 KG/M2 | DIASTOLIC BLOOD PRESSURE: 83 MMHG | HEART RATE: 81 BPM | HEIGHT: 66 IN

## 2022-11-09 DIAGNOSIS — Z96.659 HISTORY OF PARTIAL KNEE REPLACEMENT: ICD-10-CM

## 2022-11-09 DIAGNOSIS — Z96.659 HISTORY OF PARTIAL KNEE REPLACEMENT: Primary | ICD-10-CM

## 2022-11-09 PROCEDURE — 1160F RVW MEDS BY RX/DR IN RCRD: CPT | Mod: CPTII,,, | Performed by: PHYSICIAN ASSISTANT

## 2022-11-09 PROCEDURE — 73562 XR KNEE 3 VIEW RIGHT: ICD-10-PCS | Mod: RT,,, | Performed by: SPECIALIST

## 2022-11-09 PROCEDURE — 3079F PR MOST RECENT DIASTOLIC BLOOD PRESSURE 80-89 MM HG: ICD-10-PCS | Mod: CPTII,,, | Performed by: PHYSICIAN ASSISTANT

## 2022-11-09 PROCEDURE — 1160F PR REVIEW ALL MEDS BY PRESCRIBER/CLIN PHARMACIST DOCUMENTED: ICD-10-PCS | Mod: CPTII,,, | Performed by: PHYSICIAN ASSISTANT

## 2022-11-09 PROCEDURE — 3079F DIAST BP 80-89 MM HG: CPT | Mod: CPTII,,, | Performed by: PHYSICIAN ASSISTANT

## 2022-11-09 PROCEDURE — 99213 PR OFFICE/OUTPT VISIT, EST, LEVL III, 20-29 MIN: ICD-10-PCS | Mod: ,,, | Performed by: PHYSICIAN ASSISTANT

## 2022-11-09 PROCEDURE — 3008F PR BODY MASS INDEX (BMI) DOCUMENTED: ICD-10-PCS | Mod: CPTII,,, | Performed by: PHYSICIAN ASSISTANT

## 2022-11-09 PROCEDURE — 1159F PR MEDICATION LIST DOCUMENTED IN MEDICAL RECORD: ICD-10-PCS | Mod: CPTII,,, | Performed by: PHYSICIAN ASSISTANT

## 2022-11-09 PROCEDURE — 99213 OFFICE O/P EST LOW 20 MIN: CPT | Mod: ,,, | Performed by: PHYSICIAN ASSISTANT

## 2022-11-09 PROCEDURE — 1159F MED LIST DOCD IN RCRD: CPT | Mod: CPTII,,, | Performed by: PHYSICIAN ASSISTANT

## 2022-11-09 PROCEDURE — 3008F BODY MASS INDEX DOCD: CPT | Mod: CPTII,,, | Performed by: PHYSICIAN ASSISTANT

## 2022-11-09 PROCEDURE — 3074F SYST BP LT 130 MM HG: CPT | Mod: CPTII,,, | Performed by: PHYSICIAN ASSISTANT

## 2022-11-09 PROCEDURE — 3074F PR MOST RECENT SYSTOLIC BLOOD PRESSURE < 130 MM HG: ICD-10-PCS | Mod: CPTII,,, | Performed by: PHYSICIAN ASSISTANT

## 2022-11-09 PROCEDURE — 73562 X-RAY EXAM OF KNEE 3: CPT | Mod: RT,,, | Performed by: SPECIALIST

## 2022-11-09 RX ORDER — LEVONORGESTREL 52 MG/1
52 INTRAUTERINE DEVICE INTRAUTERINE
COMMUNITY
Start: 2022-04-09

## 2022-11-09 NOTE — PROGRESS NOTES
Chief Complaint:   Chief Complaint   Patient presents with    Follow-up     Pt states she is doing well after the replacement. Denies pain. No complaints.       History of present illness:    47-year-old female presents office today for a 9 month follow-up on her right knee.  She is 1 year S/P right medial compartment partial knee arthroplasty doing great.  No complaints.    Past Medical History:   Diagnosis Date    Chronic constipation     Hashimoto's thyroiditis     Hyperlipidemia     Hypertension     Hypothyroidism     Obesity     Osteoarthritis     Prediabetes        Past Surgical History:   Procedure Laterality Date    ARTHROPLASTY  10/26/2021    BIOPSY OF LESION      replacement of rt knee joint Right 10/26/2021    rt knee arthroscopy         Current Outpatient Medications   Medication Sig    amLODIPine (NORVASC) 5 MG tablet Take 5 mg by mouth once daily.    levonorgestreL (LILETTA) 20.1 mcg/24 hrs (6 yrs) 52 mg IUD 52 mg by Intrauterine route.    levothyroxine (SYNTHROID) 100 MCG tablet Take 1 tablet (100 mcg total) by mouth once daily.    linaCLOtide (LINZESS) 145 mcg Cap capsule Take 145 mcg by mouth Daily.    liothyronine (CYTOMEL) 5 MCG Tab Take 5 mcg by mouth once daily.    rosuvastatin (CRESTOR) 20 MG tablet Take 1 tablet (20 mg total) by mouth once daily.     No current facility-administered medications for this visit.       Review of patient's allergies indicates:  No Known Allergies    Family History   Problem Relation Age of Onset    Thyroid disease Mother     Diabetes Mother     Hypertension Mother     Arthritis Mother     Liver cancer Father     Heart disease Father     COPD Father     Heart disease Paternal Grandfather     Cancer Paternal Grandmother     Stroke Maternal Grandfather        Social History     Socioeconomic History    Marital status:    Tobacco Use    Smoking status: Never    Smokeless tobacco: Never   Substance and Sexual Activity    Alcohol use: No    Drug use: Never     "Sexual activity: Not Currently     Partners: Male     Birth control/protection: I.U.D., None   Social History Narrative    The patient is  and has 3 children.  She works as a .         Review of Systems:    Constitution:   Denies chills, fever, and sweats.  HENT:   Denies headaches or blurry vision.  Cardiovascular:  Denies chest pain or irregular heart beat.  Respiratory:   Denies cough or shortness of breath.  Gastrointestinal:  Denies abdominal pain, nausea, or vomiting.  Musculoskeletal:   Denies muscle cramps.  Neurological:   Denies dizziness or focal weakness.  Psychiatric/Behavior: Normal mental status.  Hematology/Lymph:  Denies bleeding problem or easy bruising/bleeding.  Skin:    Denies rash or suspicious lesions.    Examination:    Vital Signs:    Vitals:    11/09/22 0832   BP: 123/83   Pulse: 81   Weight: 98 kg (216 lb)   Height: 5' 6" (1.676 m)       Body mass index is 34.86 kg/m².    Constitution:   Well-developed, well nourished patient in no acute distress.  Neurological:   Alert and oriented x 3 and cooperative to examination.     Psychiatric/Behavior: Normal mental status.  Respiratory:   No shortness of breath.  Eyes:    Extraoccular muscles intact  Skin:    No scars, rash or suspicious lesions.    Physical Exam:     right Knee     No swelling, warmth or tenderness.    Well healed scar    No instability of the knee in mid or deep flexion     Active flexion 130 degrees     Active extension 0 degrees     No weakness was observed.    Sensation intact distally    Intact pedal pulses     A complete knee x-ray with standing 3 views was performed -of right knee.     Impressions Radiology Test   X-ray of knee was performed intact  knee implant without signs of loosening or subsidence.         Assessment:     History of partial knee replacement  -     X-Ray Knee 3 View Right; Future; Expected date: 11/09/2022        Plan:      Continue with low-impact exercising follow-up with us as " needed        No follow-ups on file.    DISCLAIMER: This note may have been dictated using voice recognition software and may contain grammatical errors.

## 2022-12-20 ENCOUNTER — PATIENT MESSAGE (OUTPATIENT)
Dept: FAMILY MEDICINE | Facility: CLINIC | Age: 47
End: 2022-12-20
Payer: COMMERCIAL

## 2022-12-20 DIAGNOSIS — N39.0 URINARY TRACT INFECTION WITHOUT HEMATURIA, SITE UNSPECIFIED: Primary | ICD-10-CM

## 2022-12-20 NOTE — TELEPHONE ENCOUNTER
Please let patient know to come to lab to complete urine and urine culture- I want to ensure there is no bacteria growth. If she continues to have a few red blood cells in the urine, I will refer her to urology to evaluate further. Thanks

## 2022-12-20 NOTE — TELEPHONE ENCOUNTER
Sent through pt portal    [facility] Dr. Ontiveros  I recently did bloodwork and a urine test to acquire life insurance. I was denied based on the urine results. I would like to be evaluated further to see why my results are consistently abnormal. I would like to see you about this or if you think I need a referral I am open to do that as well.      Thank You,  Maria E Lopez   [phone]

## 2022-12-22 ENCOUNTER — LAB VISIT (OUTPATIENT)
Dept: LAB | Facility: HOSPITAL | Age: 47
End: 2022-12-22
Attending: INTERNAL MEDICINE
Payer: COMMERCIAL

## 2022-12-22 DIAGNOSIS — N39.0 URINARY TRACT INFECTION WITHOUT HEMATURIA, SITE UNSPECIFIED: ICD-10-CM

## 2022-12-22 DIAGNOSIS — R80.9 PROTEINURIA, UNSPECIFIED TYPE: ICD-10-CM

## 2022-12-22 DIAGNOSIS — R31.29 MICROSCOPIC HEMATURIA: Primary | ICD-10-CM

## 2022-12-22 LAB
APPEARANCE UR: CLEAR
BACTERIA #/AREA URNS AUTO: ABNORMAL /HPF
BILIRUB UR QL STRIP.AUTO: NEGATIVE MG/DL
COLOR UR AUTO: ABNORMAL
CREAT UR-MCNC: 31.2 MG/DL (ref 47–110)
GLUCOSE UR QL STRIP.AUTO: NEGATIVE MG/DL
KETONES UR QL STRIP.AUTO: NEGATIVE MG/DL
LEUKOCYTE ESTERASE UR QL STRIP.AUTO: ABNORMAL UNIT/L
NITRITE UR QL STRIP.AUTO: NEGATIVE
PH UR STRIP.AUTO: 7 [PH]
PROT UR QL STRIP.AUTO: 100 MG/DL
PROT UR STRIP-MCNC: 130.1 MG/DL
RBC #/AREA URNS AUTO: ABNORMAL /HPF
RBC UR QL AUTO: ABNORMAL UNIT/L
SP GR UR STRIP.AUTO: 1.01
SQUAMOUS #/AREA URNS AUTO: ABNORMAL /HPF
URINE PROTEIN/CREATININE RATIO (OHS): 4.2
UROBILINOGEN UR STRIP-ACNC: 0.2 MG/DL
WBC #/AREA URNS AUTO: ABNORMAL /HPF

## 2022-12-22 PROCEDURE — 87088 URINE BACTERIA CULTURE: CPT

## 2022-12-22 PROCEDURE — 81003 URINALYSIS AUTO W/O SCOPE: CPT

## 2022-12-22 PROCEDURE — 81001 URINALYSIS AUTO W/SCOPE: CPT

## 2022-12-22 PROCEDURE — 82570 ASSAY OF URINE CREATININE: CPT

## 2022-12-24 LAB — BACTERIA UR CULT: NO GROWTH

## 2023-01-06 ENCOUNTER — OFFICE VISIT (OUTPATIENT)
Dept: FAMILY MEDICINE | Facility: CLINIC | Age: 48
End: 2023-01-06
Payer: COMMERCIAL

## 2023-01-06 VITALS
WEIGHT: 224.19 LBS | TEMPERATURE: 99 F | HEART RATE: 74 BPM | SYSTOLIC BLOOD PRESSURE: 130 MMHG | OXYGEN SATURATION: 99 % | RESPIRATION RATE: 18 BRPM | HEIGHT: 66 IN | BODY MASS INDEX: 36.03 KG/M2 | DIASTOLIC BLOOD PRESSURE: 86 MMHG

## 2023-01-06 DIAGNOSIS — R80.8 OTHER PROTEINURIA: ICD-10-CM

## 2023-01-06 PROCEDURE — 3008F BODY MASS INDEX DOCD: CPT | Mod: CPTII,,, | Performed by: INTERNAL MEDICINE

## 2023-01-06 PROCEDURE — 3079F PR MOST RECENT DIASTOLIC BLOOD PRESSURE 80-89 MM HG: ICD-10-PCS | Mod: CPTII,,, | Performed by: INTERNAL MEDICINE

## 2023-01-06 PROCEDURE — 3079F DIAST BP 80-89 MM HG: CPT | Mod: CPTII,,, | Performed by: INTERNAL MEDICINE

## 2023-01-06 PROCEDURE — 3008F PR BODY MASS INDEX (BMI) DOCUMENTED: ICD-10-PCS | Mod: CPTII,,, | Performed by: INTERNAL MEDICINE

## 2023-01-06 PROCEDURE — 3075F PR MOST RECENT SYSTOLIC BLOOD PRESS GE 130-139MM HG: ICD-10-PCS | Mod: CPTII,,, | Performed by: INTERNAL MEDICINE

## 2023-01-06 PROCEDURE — 1159F MED LIST DOCD IN RCRD: CPT | Mod: CPTII,,, | Performed by: INTERNAL MEDICINE

## 2023-01-06 PROCEDURE — 3075F SYST BP GE 130 - 139MM HG: CPT | Mod: CPTII,,, | Performed by: INTERNAL MEDICINE

## 2023-01-06 PROCEDURE — 1160F PR REVIEW ALL MEDS BY PRESCRIBER/CLIN PHARMACIST DOCUMENTED: ICD-10-PCS | Mod: CPTII,,, | Performed by: INTERNAL MEDICINE

## 2023-01-06 PROCEDURE — 99214 OFFICE O/P EST MOD 30 MIN: CPT | Mod: ,,, | Performed by: INTERNAL MEDICINE

## 2023-01-06 PROCEDURE — 1160F RVW MEDS BY RX/DR IN RCRD: CPT | Mod: CPTII,,, | Performed by: INTERNAL MEDICINE

## 2023-01-06 PROCEDURE — 99214 PR OFFICE/OUTPT VISIT, EST, LEVL IV, 30-39 MIN: ICD-10-PCS | Mod: ,,, | Performed by: INTERNAL MEDICINE

## 2023-01-06 PROCEDURE — 1159F PR MEDICATION LIST DOCUMENTED IN MEDICAL RECORD: ICD-10-PCS | Mod: CPTII,,, | Performed by: INTERNAL MEDICINE

## 2023-01-06 NOTE — PROGRESS NOTES
"Subjective:      Patient ID: Maria E Lopez is a 47 y.o. female.    Chief Complaint: No chief complaint on file.    HPI  Patient is here today to discuss abnormal labs, urine protein elevation.  Her urine PCR is 4; she has reported cloudy urine, joint pain diffuse, no family history of SLE. She has no report of malar rash. No family history of multiple myeloma. No diffuse edema all over reported.  Health Maintenance Due   Topic Date Due    COVID-19 Vaccine (4 - Booster for Moderna series) 02/15/2022    Mammogram  01/13/2023     Review of Systems   Constitutional:  Negative for chills and fever.   HENT:  Negative for congestion, sinus pressure and sore throat.    Respiratory:  Negative for cough and shortness of breath.    Cardiovascular:  Negative for chest pain and palpitations.   Gastrointestinal:  Negative for abdominal pain and nausea.   Musculoskeletal:  Positive for arthralgias and joint swelling.   Skin:  Negative for rash.   A comprehensive ROS was performed and is negative except as noted above.  Objective:     Vitals:    01/06/23 1147   BP: 130/86   BP Location: Right arm   Patient Position: Sitting   BP Method: Large (Automatic)   Pulse: 74   Resp: 18   Temp: 98.5 °F (36.9 °C)   TempSrc: Temporal   SpO2: 99%   Weight: 101.7 kg (224 lb 3.2 oz)   Height: 5' 6" (1.676 m)     Physical Exam  Vitals and nursing note reviewed.   Constitutional:       General: She is not in acute distress.     Appearance: Normal appearance. She is not ill-appearing.   HENT:      Head: Normocephalic and atraumatic.   Musculoskeletal:      Right lower leg: No edema.      Left lower leg: No edema.      Comments: No TTP or effusion noted over shoulder, elbows, wrist, knees, ankles, MCP hand/DIP hand, PIP hand bilaterally   Skin:     Comments: She has some discoloration over her nose but no overt malar rash     Neurological:      Mental Status: She is alert and oriented to person, place, and time.   Psychiatric:         " Behavior: Behavior normal.     Assessment/Plan:     1. Other proteinuria  -     Ambulatory referral/consult to Nephrology; Future; Expected date: 01/13/2023    Patient with proteinuria requiring renal work up for etiology  Nephrology appt set up with Dr. LOUIE Ontiveros Monday at 12:30 pm  Referral placed today  Education provided to patient     No follow-ups on file.    I spent a total of 25 minutes on the day of the visit.This includes face to face time and non-face to face time preparing to see the patient (eg, review of tests), obtaining and/or reviewing separately obtained history, documenting clinical information in the electronic or other health record, independently interpreting results and communicating results to the patient/family/caregiver, or care coordinator.

## 2023-01-07 ENCOUNTER — DOCUMENTATION ONLY (OUTPATIENT)
Dept: INTERNAL MEDICINE | Facility: CLINIC | Age: 48
End: 2023-01-07
Payer: COMMERCIAL

## 2023-01-11 DIAGNOSIS — E66.9 OBESITY, UNSPECIFIED: Primary | ICD-10-CM

## 2023-01-12 ENCOUNTER — APPOINTMENT (OUTPATIENT)
Dept: LAB | Facility: HOSPITAL | Age: 48
End: 2023-01-12
Attending: INTERNAL MEDICINE
Payer: COMMERCIAL

## 2023-01-19 ENCOUNTER — HOSPITAL ENCOUNTER (OUTPATIENT)
Dept: RADIOLOGY | Facility: HOSPITAL | Age: 48
Discharge: HOME | End: 2023-01-19
Payer: COMMERCIAL

## 2023-01-19 DIAGNOSIS — E66.9 OBESITY, UNSPECIFIED: ICD-10-CM

## 2023-01-19 PROCEDURE — 76770 US EXAM ABDO BACK WALL COMP: CPT | Mod: TC

## 2023-02-02 ENCOUNTER — HOSPITAL ENCOUNTER (OUTPATIENT)
Dept: RADIOLOGY | Facility: HOSPITAL | Age: 48
Discharge: HOME | End: 2023-02-02
Attending: INTERNAL MEDICINE
Payer: COMMERCIAL

## 2023-02-02 DIAGNOSIS — Z12.31 ENCOUNTER FOR SCREENING MAMMOGRAM FOR BREAST CANCER: ICD-10-CM

## 2023-02-02 PROCEDURE — 77067 SCR MAMMO BI INCL CAD: CPT | Mod: TC

## 2023-02-02 PROCEDURE — 77063 MAMMO DIGITAL SCREENING BILAT WITH TOMO: ICD-10-PCS | Mod: 26,,, | Performed by: RADIOLOGY

## 2023-02-02 PROCEDURE — 77067 MAMMO DIGITAL SCREENING BILAT WITH TOMO: ICD-10-PCS | Mod: 26,,, | Performed by: RADIOLOGY

## 2023-02-02 PROCEDURE — 77063 BREAST TOMOSYNTHESIS BI: CPT | Mod: 26,,, | Performed by: RADIOLOGY

## 2023-02-02 PROCEDURE — 77067 SCR MAMMO BI INCL CAD: CPT | Mod: 26,,, | Performed by: RADIOLOGY

## 2023-02-14 LAB
PAP RECOMMENDATION EXT: NORMAL
PAP SMEAR: NORMAL

## 2023-02-22 ENCOUNTER — HOSPITAL ENCOUNTER (OUTPATIENT)
Dept: RADIOLOGY | Facility: HOSPITAL | Age: 48
Discharge: HOME | End: 2023-02-22
Attending: INTERNAL MEDICINE
Payer: COMMERCIAL

## 2023-02-22 VITALS
WEIGHT: 224.19 LBS | TEMPERATURE: 99 F | HEART RATE: 67 BPM | OXYGEN SATURATION: 100 % | BODY MASS INDEX: 36.03 KG/M2 | DIASTOLIC BLOOD PRESSURE: 98 MMHG | SYSTOLIC BLOOD PRESSURE: 132 MMHG | HEIGHT: 66 IN | RESPIRATION RATE: 18 BRPM

## 2023-02-22 DIAGNOSIS — R80.9 PROTEINURIA, UNSPECIFIED TYPE: ICD-10-CM

## 2023-02-22 LAB
ALBUMIN SERPL-MCNC: 3.1 G/DL (ref 3.5–5)
ALBUMIN/GLOB SERPL: 1.2 RATIO (ref 1.1–2)
ALP SERPL-CCNC: 39 UNIT/L (ref 40–150)
ALT SERPL-CCNC: 13 UNIT/L (ref 0–55)
AST SERPL-CCNC: 14 UNIT/L (ref 5–34)
BASOPHILS # BLD AUTO: 0.04 X10(3)/MCL (ref 0–0.2)
BASOPHILS NFR BLD AUTO: 0.9 %
BILIRUBIN DIRECT+TOT PNL SERPL-MCNC: 1.4 MG/DL
BUN SERPL-MCNC: 7.2 MG/DL (ref 7–18.7)
CALCIUM SERPL-MCNC: 8.7 MG/DL (ref 8.4–10.2)
CHLORIDE SERPL-SCNC: 107 MMOL/L (ref 98–107)
CO2 SERPL-SCNC: 27 MMOL/L (ref 22–29)
CREAT SERPL-MCNC: 0.76 MG/DL (ref 0.55–1.02)
EOSINOPHIL # BLD AUTO: 0.2 X10(3)/MCL (ref 0–0.9)
EOSINOPHIL NFR BLD AUTO: 4.3 %
ERYTHROCYTE [DISTWIDTH] IN BLOOD BY AUTOMATED COUNT: 13.4 % (ref 11.5–17)
GFR SERPLBLD CREATININE-BSD FMLA CKD-EPI: >60 MLS/MIN/1.73/M2
GLOBULIN SER-MCNC: 2.5 GM/DL (ref 2.4–3.5)
GLUCOSE SERPL-MCNC: 105 MG/DL (ref 74–100)
HCT VFR BLD AUTO: 40.3 % (ref 37–47)
HGB BLD-MCNC: 12.8 G/DL (ref 12–16)
IMM GRANULOCYTES # BLD AUTO: 0.01 X10(3)/MCL (ref 0–0.04)
IMM GRANULOCYTES NFR BLD AUTO: 0.2 %
INR BLD: 0.94 (ref 0–1.3)
LYMPHOCYTES # BLD AUTO: 2.56 X10(3)/MCL (ref 0.6–4.6)
LYMPHOCYTES NFR BLD AUTO: 55.7 %
MCH RBC QN AUTO: 28 PG
MCHC RBC AUTO-ENTMCNC: 31.8 G/DL (ref 33–36)
MCV RBC AUTO: 88.2 FL (ref 80–94)
MONOCYTES # BLD AUTO: 0.32 X10(3)/MCL (ref 0.1–1.3)
MONOCYTES NFR BLD AUTO: 7 %
NEUTROPHILS # BLD AUTO: 1.47 X10(3)/MCL (ref 2.1–9.2)
NEUTROPHILS NFR BLD AUTO: 31.9 %
NRBC BLD AUTO-RTO: 0 %
PLATELET # BLD AUTO: 299 X10(3)/MCL (ref 130–400)
PMV BLD AUTO: 9.9 FL (ref 7.4–10.4)
POTASSIUM SERPL-SCNC: 4.2 MMOL/L (ref 3.5–5.1)
PROT SERPL-MCNC: 5.6 GM/DL (ref 6.4–8.3)
PROTHROMBIN TIME: 12.5 SECONDS (ref 12.5–14.5)
RBC # BLD AUTO: 4.57 X10(6)/MCL (ref 4.2–5.4)
SODIUM SERPL-SCNC: 140 MMOL/L (ref 136–145)
WBC # SPEC AUTO: 4.6 X10(3)/MCL (ref 4.5–11.5)

## 2023-02-22 PROCEDURE — 85610 PROTHROMBIN TIME: CPT | Performed by: RADIOLOGY

## 2023-02-22 PROCEDURE — 25000003 PHARM REV CODE 250: Performed by: RADIOLOGY

## 2023-02-22 PROCEDURE — 85025 COMPLETE CBC W/AUTO DIFF WBC: CPT | Performed by: RADIOLOGY

## 2023-02-22 PROCEDURE — 77012 CT SCAN FOR NEEDLE BIOPSY: CPT | Mod: TC

## 2023-02-22 PROCEDURE — 80053 COMPREHEN METABOLIC PANEL: CPT | Performed by: RADIOLOGY

## 2023-02-22 PROCEDURE — 63600175 PHARM REV CODE 636 W HCPCS: Performed by: RADIOLOGY

## 2023-02-22 RX ORDER — MIDAZOLAM HYDROCHLORIDE 1 MG/ML
INJECTION INTRAMUSCULAR; INTRAVENOUS
Status: COMPLETED | OUTPATIENT
Start: 2023-02-22 | End: 2023-02-22

## 2023-02-22 RX ORDER — HYDRALAZINE HYDROCHLORIDE 20 MG/ML
INJECTION INTRAMUSCULAR; INTRAVENOUS
Status: DISCONTINUED
Start: 2023-02-22 | End: 2023-02-22 | Stop reason: WASHOUT

## 2023-02-22 RX ORDER — MIDAZOLAM HYDROCHLORIDE 1 MG/ML
INJECTION INTRAMUSCULAR; INTRAVENOUS
Status: DISPENSED
Start: 2023-02-22 | End: 2023-02-22

## 2023-02-22 RX ORDER — LIDOCAINE HYDROCHLORIDE 20 MG/ML
INJECTION, SOLUTION EPIDURAL; INFILTRATION; INTRACAUDAL; PERINEURAL
Status: DISPENSED
Start: 2023-02-22 | End: 2023-02-22

## 2023-02-22 RX ORDER — FENTANYL CITRATE 50 UG/ML
INJECTION, SOLUTION INTRAMUSCULAR; INTRAVENOUS
Status: DISPENSED
Start: 2023-02-22 | End: 2023-02-22

## 2023-02-22 RX ORDER — LIDOCAINE HYDROCHLORIDE 20 MG/ML
INJECTION, SOLUTION INFILTRATION; PERINEURAL
Status: COMPLETED | OUTPATIENT
Start: 2023-02-22 | End: 2023-02-22

## 2023-02-22 RX ORDER — FENTANYL CITRATE 50 UG/ML
INJECTION, SOLUTION INTRAMUSCULAR; INTRAVENOUS
Status: COMPLETED | OUTPATIENT
Start: 2023-02-22 | End: 2023-02-22

## 2023-02-22 RX ADMIN — MIDAZOLAM 0.5 MG: 1 INJECTION INTRAMUSCULAR; INTRAVENOUS at 08:02

## 2023-02-22 RX ADMIN — LIDOCAINE HYDROCHLORIDE 5 ML: 20 INJECTION, SOLUTION INFILTRATION; PERINEURAL at 08:02

## 2023-02-22 RX ADMIN — FENTANYL CITRATE 25 MCG: 50 INJECTION, SOLUTION INTRAMUSCULAR; INTRAVENOUS at 08:02

## 2023-02-22 NOTE — DISCHARGE SUMMARY
Radiology Post-Procedure Note    Pre Op Diagnosis: Renal Dysfunction    Post Op Diagnosis: As Above    Procedure: Renal biopsy    Procedure performed by: Babatunde Garcia M.D.    Written Informed Consent Obtained: Yes    Specimen Removed: YES     Estimated Blood Loss: Minimal    Findings:   Standard Renal Biopsy    Patient tolerated procedure well.    Babatunde Garcia MD

## 2023-02-22 NOTE — H&P
Radiology History & Physical      SUBJECTIVE:     Chief Complaint: Renal Dysfunction    History of Present Illness:  Maria E Lopez is a 47 y.o. female who presents for Biopsy  Past Medical History:   Diagnosis Date    Chronic constipation     Hashimoto's thyroiditis     Hyperlipidemia     Hypertension     Hypothyroidism     Obesity     Osteoarthritis     Prediabetes      Past Surgical History:   Procedure Laterality Date    ARTHROPLASTY  10/26/2021    BIOPSY OF LESION      replacement of rt knee joint Right 10/26/2021    rt knee arthroscopy         Home Meds:   Prior to Admission medications    Medication Sig Start Date End Date Taking? Authorizing Provider   amLODIPine (NORVASC) 5 MG tablet Take 5 mg by mouth once daily. 4/28/22  Yes Historical Provider   levothyroxine (SYNTHROID) 100 MCG tablet Take 1 tablet (100 mcg total) by mouth once daily. 1/26/17  Yes Kvaita Carlos MD   linaCLOtide (LINZESS) 145 mcg Cap capsule Take 145 mcg by mouth Daily.   Yes Historical Provider   liothyronine (CYTOMEL) 5 MCG Tab Take 5 mcg by mouth once daily. 4/1/22  Yes Historical Provider   rosuvastatin (CRESTOR) 20 MG tablet Take 1 tablet (20 mg total) by mouth once daily. 10/27/22 10/27/23 Yes Bob Ontiveros MD   levonorgestreL (LILETTA) 20.1 mcg/24 hrs (6 yrs) 52 mg IUD 52 mg by Intrauterine route. 4/9/22   Historical Provider     Anticoagulants/Antiplatelets: no anticoagulation    Allergies: Review of patient's allergies indicates:  No Known Allergies  Sedation History:  no adverse reactions    Review of Systems:   Hematological: no known coagulopathies  Respiratory: no shortness of breath  Cardiovascular: no chest pain  Gastrointestinal: no abdominal pain  Genito-Urinary: no dysuria  Musculoskeletal: negative  Neurological: no TIA or stroke symptoms         OBJECTIVE:     Vital Signs (Most Recent)  Temp: 98.6 °F (37 °C) (02/22/23 0644)  Pulse: 62 (02/22/23 1030)  Resp: 18 (02/22/23 0915)  BP: 108/78 (02/22/23  1030)  SpO2: 99 % (02/22/23 1030)    Physical Exam:  ASA: 2    General: no acute distress  Mental Status: alert and oriented to person, place and time  HEENT: normocephalic, atraumatic  Chest: unlabored breathing  Heart: regular heart rate  Abdomen: nondistended  Extremity: moves all extremities    Laboratory  Lab Results   Component Value Date    INR 0.94 02/22/2023       Lab Results   Component Value Date    WBC 4.6 02/22/2023    HGB 12.8 02/22/2023    HCT 40.3 02/22/2023    MCV 88.2 02/22/2023     02/22/2023      Lab Results   Component Value Date     09/20/2016     02/22/2023    K 4.2 02/22/2023     09/20/2016    CO2 27 02/22/2023    BUN 7.2 02/22/2023    CREATININE 0.76 02/22/2023    CALCIUM 8.7 02/22/2023    ALT 13 02/22/2023    AST 14 02/22/2023    ALBUMIN 3.1 (L) 02/22/2023    BILITOT 1.4 02/22/2023    BILIDIR 0.2 04/21/2022       ASSESSMENT/PLAN:     Sedation Plan: Moderate  Patient will undergo Renal Biopsy non targeted    Babatunde Garcia MD

## 2023-02-28 LAB — PSYCHE PATHOLOGY RESULT: NORMAL

## 2023-03-01 ENCOUNTER — TELEPHONE (OUTPATIENT)
Dept: FAMILY MEDICINE | Facility: CLINIC | Age: 48
End: 2023-03-01
Payer: COMMERCIAL

## 2023-03-02 ENCOUNTER — TELEPHONE (OUTPATIENT)
Dept: FAMILY MEDICINE | Facility: CLINIC | Age: 48
End: 2023-03-02
Payer: COMMERCIAL

## 2023-03-02 DIAGNOSIS — R80.8 OTHER PROTEINURIA: Primary | ICD-10-CM

## 2023-03-02 DIAGNOSIS — R92.8 ABNORMAL MAMMOGRAM: Primary | ICD-10-CM

## 2023-03-02 NOTE — TELEPHONE ENCOUNTER
I have signed for the following orders AND/OR meds. Please notify the patient and ask the patient to schedule the testing and/or information about any medications that were sent.      Please call [facility] [place]  Make sure that they call patient toschedule f/u images at their location given that patient has a preference  Please notify patient we are moving her to [facility] in [facility] for add'l imaging  thanks       Orders Placed This Encounter   Procedures    Mammo Digital Diagnostic Left     Standing Status:   Future     Standing Expiration Date:   3/2/2025     Order Specific Question:   May the Radiologist modify the order per protocol to meet the clinical needs of the patient?     Answer:   Yes     Order Specific Question:   Release to patient     Answer:   Immediate    US Breast Left Complete     Standing Status:   Future     Standing Expiration Date:   3/2/2025     Order Specific Question:   May the Radiologist modify the order per protocol to meet the clinical needs of the patient?     Answer:   Yes     Order Specific Question:   Release to patient     Answer:   Immediate

## 2023-03-02 NOTE — TELEPHONE ENCOUNTER
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 Patient informed me she was upset with their office as they had scheduled her appts without results a few times and given the copay and time taken out of her day, it was upsetting to her. She says that she does want to see another kidney specialist given the issues with the office communication etc. I have placed new referral to [place]. Gustafson [place] [facility], please make sure we sent referral to him ASAP and call their office for next available, patient has all nephro notes, labs and biopsy results in the system so this should be straightforward for them.    #2: I did sent add'l breast images needed to [facility]  Please make sure our breast center calls patient to schedule, not at [facility]    #3: please call jovanny Gomez for pap smear results, patient says she just completed      Thanks

## 2023-03-02 NOTE — TELEPHONE ENCOUNTER
----- Message from Wendy Naidu LPN sent at 3/2/2023  8:45 AM CST -----  Pt stated any [facility] facility besides [facility]  Thanks

## 2023-03-29 ENCOUNTER — LAB VISIT (OUTPATIENT)
Dept: LAB | Facility: HOSPITAL | Age: 48
End: 2023-03-29
Attending: INTERNAL MEDICINE
Payer: COMMERCIAL

## 2023-03-29 DIAGNOSIS — I10 HYPERTENSION, UNSPECIFIED TYPE: ICD-10-CM

## 2023-03-29 DIAGNOSIS — R80.9 PROTEINURIA, UNSPECIFIED TYPE: ICD-10-CM

## 2023-03-29 DIAGNOSIS — I10 HYPERTENSION, UNSPECIFIED TYPE: Primary | ICD-10-CM

## 2023-03-29 LAB
APPEARANCE UR: CLEAR
BACTERIA #/AREA URNS AUTO: ABNORMAL /HPF
BILIRUB UR QL STRIP.AUTO: NEGATIVE MG/DL
COLOR UR AUTO: ABNORMAL
CREAT UR-MCNC: 68.1 MG/DL (ref 47–110)
GLUCOSE UR QL STRIP.AUTO: NEGATIVE MG/DL
KETONES UR QL STRIP.AUTO: NEGATIVE MG/DL
LEUKOCYTE ESTERASE UR QL STRIP.AUTO: NEGATIVE UNIT/L
NITRITE UR QL STRIP.AUTO: NEGATIVE
PH UR STRIP.AUTO: 6.5 [PH]
PROT UR QL STRIP.AUTO: 100 MG/DL
PROT UR STRIP-MCNC: 163.7 MG/DL
RBC #/AREA URNS AUTO: ABNORMAL /HPF
RBC UR QL AUTO: ABNORMAL UNIT/L
SP GR UR STRIP.AUTO: 1.02
SQUAMOUS #/AREA URNS AUTO: ABNORMAL /HPF
UROBILINOGEN UR STRIP-ACNC: 0.2 MG/DL
WBC #/AREA URNS AUTO: ABNORMAL /HPF

## 2023-03-29 PROCEDURE — 82570 ASSAY OF URINE CREATININE: CPT

## 2023-03-29 PROCEDURE — 84156 ASSAY OF PROTEIN URINE: CPT

## 2023-03-29 PROCEDURE — 81001 URINALYSIS AUTO W/SCOPE: CPT

## 2023-03-29 RX ORDER — PREDNISONE 20 MG/1
60 TABLET ORAL DAILY
COMMUNITY
Start: 2023-03-09 | End: 2023-05-11 | Stop reason: SDUPTHER

## 2023-03-29 RX ORDER — MYCOPHENOLATE MOFETIL 500 MG/1
500 TABLET ORAL 2 TIMES DAILY
COMMUNITY
Start: 2023-03-09 | End: 2024-03-12 | Stop reason: SDUPTHER

## 2023-03-29 RX ORDER — OMEPRAZOLE 20 MG/1
CAPSULE, DELAYED RELEASE ORAL DAILY
COMMUNITY
Start: 2023-03-09

## 2023-04-05 ENCOUNTER — OFFICE VISIT (OUTPATIENT)
Dept: NEPHROLOGY | Facility: CLINIC | Age: 48
End: 2023-04-05
Payer: COMMERCIAL

## 2023-04-05 VITALS
BODY MASS INDEX: 36.32 KG/M2 | TEMPERATURE: 97 F | OXYGEN SATURATION: 98 % | HEART RATE: 80 BPM | RESPIRATION RATE: 20 BRPM | SYSTOLIC BLOOD PRESSURE: 140 MMHG | DIASTOLIC BLOOD PRESSURE: 90 MMHG | HEIGHT: 66 IN | WEIGHT: 226 LBS

## 2023-04-05 DIAGNOSIS — R80.8 OTHER PROTEINURIA: ICD-10-CM

## 2023-04-05 DIAGNOSIS — R80.9 PROTEINURIA, UNSPECIFIED TYPE: Primary | ICD-10-CM

## 2023-04-05 DIAGNOSIS — I10 HYPERTENSION, UNSPECIFIED TYPE: ICD-10-CM

## 2023-04-05 PROCEDURE — 1159F MED LIST DOCD IN RCRD: CPT | Mod: CPTII,S$GLB,, | Performed by: INTERNAL MEDICINE

## 2023-04-05 PROCEDURE — 99204 PR OFFICE/OUTPT VISIT, NEW, LEVL IV, 45-59 MIN: ICD-10-PCS | Mod: S$GLB,,, | Performed by: INTERNAL MEDICINE

## 2023-04-05 PROCEDURE — 3080F PR MOST RECENT DIASTOLIC BLOOD PRESSURE >= 90 MM HG: ICD-10-PCS | Mod: CPTII,S$GLB,, | Performed by: INTERNAL MEDICINE

## 2023-04-05 PROCEDURE — 99999 PR PBB SHADOW E&M-EST. PATIENT-LVL V: ICD-10-PCS | Mod: PBBFAC,,, | Performed by: INTERNAL MEDICINE

## 2023-04-05 PROCEDURE — 99204 OFFICE O/P NEW MOD 45 MIN: CPT | Mod: S$GLB,,, | Performed by: INTERNAL MEDICINE

## 2023-04-05 PROCEDURE — 3008F PR BODY MASS INDEX (BMI) DOCUMENTED: ICD-10-PCS | Mod: CPTII,S$GLB,, | Performed by: INTERNAL MEDICINE

## 2023-04-05 PROCEDURE — 3066F PR DOCUMENTATION OF TREATMENT FOR NEPHROPATHY: ICD-10-PCS | Mod: CPTII,S$GLB,, | Performed by: INTERNAL MEDICINE

## 2023-04-05 PROCEDURE — 3066F NEPHROPATHY DOC TX: CPT | Mod: CPTII,S$GLB,, | Performed by: INTERNAL MEDICINE

## 2023-04-05 PROCEDURE — 1159F PR MEDICATION LIST DOCUMENTED IN MEDICAL RECORD: ICD-10-PCS | Mod: CPTII,S$GLB,, | Performed by: INTERNAL MEDICINE

## 2023-04-05 PROCEDURE — 3077F PR MOST RECENT SYSTOLIC BLOOD PRESSURE >= 140 MM HG: ICD-10-PCS | Mod: CPTII,S$GLB,, | Performed by: INTERNAL MEDICINE

## 2023-04-05 PROCEDURE — 3077F SYST BP >= 140 MM HG: CPT | Mod: CPTII,S$GLB,, | Performed by: INTERNAL MEDICINE

## 2023-04-05 PROCEDURE — 3008F BODY MASS INDEX DOCD: CPT | Mod: CPTII,S$GLB,, | Performed by: INTERNAL MEDICINE

## 2023-04-05 PROCEDURE — 99999 PR PBB SHADOW E&M-EST. PATIENT-LVL V: CPT | Mod: PBBFAC,,, | Performed by: INTERNAL MEDICINE

## 2023-04-05 PROCEDURE — 3080F DIAST BP >= 90 MM HG: CPT | Mod: CPTII,S$GLB,, | Performed by: INTERNAL MEDICINE

## 2023-04-05 NOTE — PROGRESS NOTES
[facility] New Referral Office Note    HPI  Maria E Lopez, 47 y.o. female, presents to office fas a new patient.  Patient had nephrotic range proteinuria hypoalbuminemia and hyperlipidemia she had a renal biopsy that suggested membranous and immune complex disease  Lupus panel has been negative complements normal  Patient was started on CellCept and steroid repeat urine spot protein to creatinine showed improvement in proteinuria patient denies edema skin rash foamy urine nausea or vomiting.  Occasional right knee pain.        Medical Diagnoses:   Past Medical History:   Diagnosis Date    Chronic constipation     Hashimoto's thyroiditis     Hyperlipidemia     Hypertension     Hypothyroidism     Obesity     Osteoarthritis     Prediabetes      Patient Active Problem List   Diagnosis    Acquired hypothyroidism    Chronic constipation    Severe obesity (BMI 35.0-39.9) with comorbidity    Prediabetes    Microscopic hematuria    Osteoarthritis of right knee    Hypertension    Hyperlipidemia    Other proteinuria       Surgical History:   Past Surgical History:   Procedure Laterality Date    ARTHROPLASTY  10/26/2021    BIOPSY OF LESION      replacement of rt knee joint Right 10/26/2021    rt knee arthroscopy         Family History:   Family History   Problem Relation Age of Onset    Thyroid disease Mother     Diabetes Mother     Hypertension Mother     Arthritis Mother     Liver cancer Father     Heart disease Father     COPD Father     Heart disease Paternal Grandfather     Cancer Paternal Grandmother     Stroke Maternal Grandfather        Social History:   Social History     Tobacco Use    Smoking status: Never     Passive exposure: Never    Smokeless tobacco: Never   Substance Use Topics    Alcohol use: No       Allergies:  Review of patient's allergies indicates:  No Known Allergies    Medications:    Current Outpatient Medications:     amLODIPine (NORVASC) 5 MG tablet, TAKE 1 TABLET BY MOUTH  DAILY, Disp:  "90 tablet, Rfl: 3    levonorgestreL (LILETTA) 20.1 mcg/24 hrs (6 yrs) 52 mg IUD, 52 mg by Intrauterine route., Disp: , Rfl:     levothyroxine (SYNTHROID) 100 MCG tablet, TAKE 1 TABLET BY MOUTH  DAILY, Disp: 90 tablet, Rfl: 3    linaCLOtide (LINZESS) 145 mcg Cap capsule, Take 145 mcg by mouth Daily., Disp: , Rfl:     liothyronine (CYTOMEL) 5 MCG Tab, TAKE 1 TABLET BY MOUTH  DAILY, Disp: 90 tablet, Rfl: 3    mycophenolate (CELLCEPT) 500 mg Tab, Take 500 mg by mouth 2 (two) times daily., Disp: , Rfl:     omeprazole (PRILOSEC) 20 MG capsule, Take by mouth Daily., Disp: , Rfl:     predniSONE (DELTASONE) 20 MG tablet, Take 60 mg by mouth Daily., Disp: , Rfl:     rosuvastatin (CRESTOR) 20 MG tablet, Take 1 tablet (20 mg total) by mouth once daily., Disp: 90 tablet, Rfl: 3       Review of Systems:    Constitutional: Denies fever, fatigue, generalized weakness  Skin: Denies wounds, no rashes, no itching, no new skin lesions  Respiratory:  Denies cough, shortness of breath, or wheezing  Cardiovascular: Denies chest pain, palpitations, or swelling  Gastrointestional: Denies abdominal pain, nausea, vomiting, diarrhea, or constipation  Genitourinary: Denies dysuria, hematuria, foamy urine, or incontinence; reports able to empty bladder  Musculoskeletal: Denies back or flank pain  Neurological: Denies headaches, dizziness, paresthesias, tremors or focal weakness      Vital Signs:  BP (!) 140/90 (BP Location: Left arm, Patient Position: Sitting)   Pulse 80   Temp 97.2 °F (36.2 °C) (Temporal)   Resp 20   Ht 5' 6" (1.676 m)   Wt 102.5 kg (225 lb 15.5 oz)   SpO2 98%   BMI 36.47 kg/m²   Body mass index is 36.47 kg/m².      Physical Exam:    General: no acute distress, awake, alert  Eyes: PERRLA, conjunctiva clear, eyelids without swelling  HENT: atraumatic, oropharynx and nasal mucosa patent  Neck: supple, trache midline, full ROM, no JVD, no thyromegaly or lymphadenopathy  Respiratory: equal, unlabored, clear to auscultation " A/P  Cardiovascular: RRR without murmur or rub; radial and pedal pulses +2 BL  Edema: none  Gastrointestinal: soft, non-tender, non-distended; positive bowel sounds; no masses to palpation; no ascites  Genitourinary: no CVA tenderness upon palpation  Musculoskeletal: ROM without new limitation or discomfort  Integumentary: warm, dry; no rashes, wounds, or skin lesions  Neurological: oriented x4, appropriate, no acute deficits; no asterixis      Labs:        Component Value Date/Time     03/29/2023 0918     02/22/2023 0709     09/20/2016 0805     06/16/2015 1016    K 4.0 03/29/2023 0918    K 4.2 02/22/2023 0709    K 4.3 09/20/2016 0805    K 3.9 06/16/2015 1016     09/20/2016 0805     06/16/2015 1016    CO2 29 03/29/2023 0918    CO2 27 02/22/2023 0709    CO2 25 09/20/2016 0805    CO2 24 06/16/2015 1016    BUN 10.7 03/29/2023 0918    BUN 7.2 02/22/2023 0709    BUN 7 09/20/2016 0805    BUN 6 06/16/2015 1016    CREATININE 0.82 03/29/2023 0918    CREATININE 0.76 02/22/2023 0709    CREATININE 0.73 01/12/2023 0835    CREATININE 0.91 04/21/2022 0804    CREATININE 1.0 09/20/2016 0805    CREATININE 0.9 06/16/2015 1016    CREATININE 0.9 11/28/2012 0805    CREATININE 0.7 07/22/2010 0805    CALCIUM 8.8 03/29/2023 0918    CALCIUM 8.7 02/22/2023 0709    CALCIUM 9.0 09/20/2016 0805    CALCIUM 9.2 06/16/2015 1016    PHOS 2.7 03/29/2023 0918           Component Value Date/Time    WBC 7.9 03/29/2023 0918    WBC 4.6 02/22/2023 0709    WBC 4.50 09/20/2016 0805    WBC 4.54 06/16/2015 1016    HGB 13.4 03/29/2023 0918    HGB 12.8 02/22/2023 0709    HGB 12.6 09/20/2016 0805    HGB 13.0 06/16/2015 1016    HCT 43.1 03/29/2023 0918    HCT 40.3 02/22/2023 0709    HCT 39.9 09/20/2016 0805    HCT 39.2 06/16/2015 1016     03/29/2023 0918     02/22/2023 0709     09/20/2016 0805     06/16/2015 1016         Imaging:  Retroperitoneal US:      Impression:    1. Proteinuria, unspecified type         2. Other proteinuria  Ambulatory referral/consult to Nephrology        Membranous nephropathy  Although the stain was suggestive of questionable immune complex but we could be dealing simply with a primary membranous disease  I did explain to the patient that less than 25% of the cases can have spontaneous remission more than 25% can have progressive disease and up to 50% can have slow lingering disease      In anyway since patient had nephrotic proteinuria and nephrotic syndrome presentation which was not unreasonable to start her on immunosuppressive therapy  Since patient's showed response to CellCept and steroid, I like to keep immune therapy as  such for now.  However if there are signs of deterioration or no major improvement other considerations include possibility of calcineurin inhibitors which are usually the primary agent therapies    Obviously if there are signs of rapidly progressive disease ,then strong consideration for ponticelli or modified Ponticelli protocol or rituximab will be done accordingly    Plan:    Repeat labs check serologies see her back in few weeks and decide accordingly as planned     Patient to call our office with any concerns prior to next appointment.    Avoid NSAIDs (Aleve, Mobic, Celebrex, Ibuprofen, Advil, Toradol and Diclofenac).  Only take tylenol occasionally if needed for aches/pains.    Educated on low sodium diet:  Avoid high salt foods (olives, pickles, smoked meats, deli meats, salted potato chips, etc.).   Do not add salt to your food at the table.   Use only small amounts of salt when cooking.    Recommended Daily Protein Intake for chronic kidney disease:  0.8 g/kg  Kat Gustafson

## 2023-04-24 ENCOUNTER — LAB VISIT (OUTPATIENT)
Dept: LAB | Facility: HOSPITAL | Age: 48
End: 2023-04-24
Attending: INTERNAL MEDICINE
Payer: COMMERCIAL

## 2023-04-24 DIAGNOSIS — Z00.00 WELLNESS EXAMINATION: ICD-10-CM

## 2023-04-24 DIAGNOSIS — K59.09 CHRONIC CONSTIPATION: ICD-10-CM

## 2023-04-24 DIAGNOSIS — E03.9 ACQUIRED HYPOTHYROIDISM: ICD-10-CM

## 2023-04-24 DIAGNOSIS — I10 HYPERTENSION, UNSPECIFIED TYPE: ICD-10-CM

## 2023-04-24 DIAGNOSIS — R31.29 MICROSCOPIC HEMATURIA: ICD-10-CM

## 2023-04-24 DIAGNOSIS — R73.03 PREDIABETES: ICD-10-CM

## 2023-04-24 DIAGNOSIS — E78.5 HYPERLIPIDEMIA, UNSPECIFIED HYPERLIPIDEMIA TYPE: ICD-10-CM

## 2023-04-24 LAB
ALBUMIN SERPL-MCNC: 3.2 G/DL (ref 3.5–5)
ALBUMIN/GLOB SERPL: 1.1 RATIO (ref 1.1–2)
ALP SERPL-CCNC: 32 UNIT/L (ref 40–150)
ALT SERPL-CCNC: 17 UNIT/L (ref 0–55)
AST SERPL-CCNC: 15 UNIT/L (ref 5–34)
BASOPHILS # BLD AUTO: 0.02 X10(3)/MCL (ref 0–0.2)
BASOPHILS NFR BLD AUTO: 0.3 %
BILIRUBIN DIRECT+TOT PNL SERPL-MCNC: 0.7 MG/DL
BUN SERPL-MCNC: 8.9 MG/DL (ref 7–18.7)
CALCIUM SERPL-MCNC: 9 MG/DL (ref 8.4–10.2)
CHLORIDE SERPL-SCNC: 107 MMOL/L (ref 98–107)
CHOLEST SERPL-MCNC: 236 MG/DL
CHOLEST/HDLC SERPL: 3 {RATIO} (ref 0–5)
CO2 SERPL-SCNC: 27 MMOL/L (ref 22–29)
CREAT SERPL-MCNC: 0.78 MG/DL (ref 0.55–1.02)
EOSINOPHIL # BLD AUTO: 0.02 X10(3)/MCL (ref 0–0.9)
EOSINOPHIL NFR BLD AUTO: 0.3 %
ERYTHROCYTE [DISTWIDTH] IN BLOOD BY AUTOMATED COUNT: 14.4 % (ref 11.5–17)
EST. AVERAGE GLUCOSE BLD GHB EST-MCNC: 125.5 MG/DL
GFR SERPLBLD CREATININE-BSD FMLA CKD-EPI: >60 MLS/MIN/1.73/M2
GLOBULIN SER-MCNC: 2.9 GM/DL (ref 2.4–3.5)
GLUCOSE SERPL-MCNC: 134 MG/DL (ref 74–100)
HBA1C MFR BLD: 6 %
HCT VFR BLD AUTO: 43.1 % (ref 37–47)
HDLC SERPL-MCNC: 80 MG/DL (ref 35–60)
HGB BLD-MCNC: 13.3 G/DL (ref 12–16)
IMM GRANULOCYTES # BLD AUTO: 0.06 X10(3)/MCL (ref 0–0.04)
IMM GRANULOCYTES NFR BLD AUTO: 0.9 %
LDLC SERPL CALC-MCNC: 140 MG/DL (ref 50–140)
LYMPHOCYTES # BLD AUTO: 1.72 X10(3)/MCL (ref 0.6–4.6)
LYMPHOCYTES NFR BLD AUTO: 24.7 %
MCH RBC QN AUTO: 28.6 PG (ref 27–31)
MCHC RBC AUTO-ENTMCNC: 30.9 G/DL (ref 33–36)
MCV RBC AUTO: 92.7 FL (ref 80–94)
MONOCYTES # BLD AUTO: 0.29 X10(3)/MCL (ref 0.1–1.3)
MONOCYTES NFR BLD AUTO: 4.2 %
NEUTROPHILS # BLD AUTO: 4.84 X10(3)/MCL (ref 2.1–9.2)
NEUTROPHILS NFR BLD AUTO: 69.6 %
NRBC BLD AUTO-RTO: 0 %
PLATELET # BLD AUTO: 305 X10(3)/MCL (ref 130–400)
PMV BLD AUTO: 9.5 FL (ref 7.4–10.4)
POTASSIUM SERPL-SCNC: 4.3 MMOL/L (ref 3.5–5.1)
PROT SERPL-MCNC: 6.1 GM/DL (ref 6.4–8.3)
RBC # BLD AUTO: 4.65 X10(6)/MCL (ref 4.2–5.4)
SODIUM SERPL-SCNC: 143 MMOL/L (ref 136–145)
TRIGL SERPL-MCNC: 82 MG/DL (ref 37–140)
TSH SERPL-ACNC: 1.4 UIU/ML (ref 0.35–4.94)
VLDLC SERPL CALC-MCNC: 16 MG/DL
WBC # SPEC AUTO: 7 X10(3)/MCL (ref 4.5–11.5)

## 2023-04-24 PROCEDURE — 80053 COMPREHEN METABOLIC PANEL: CPT

## 2023-04-24 PROCEDURE — 85025 COMPLETE CBC W/AUTO DIFF WBC: CPT

## 2023-04-24 PROCEDURE — 83036 HEMOGLOBIN GLYCOSYLATED A1C: CPT

## 2023-04-24 PROCEDURE — 36415 COLL VENOUS BLD VENIPUNCTURE: CPT

## 2023-04-24 PROCEDURE — 80061 LIPID PANEL: CPT

## 2023-04-24 PROCEDURE — 84443 ASSAY THYROID STIM HORMONE: CPT

## 2023-04-27 ENCOUNTER — TELEPHONE (OUTPATIENT)
Dept: FAMILY MEDICINE | Facility: CLINIC | Age: 48
End: 2023-04-27

## 2023-04-27 ENCOUNTER — OFFICE VISIT (OUTPATIENT)
Dept: FAMILY MEDICINE | Facility: CLINIC | Age: 48
End: 2023-04-27
Payer: COMMERCIAL

## 2023-04-27 VITALS
TEMPERATURE: 97 F | SYSTOLIC BLOOD PRESSURE: 130 MMHG | HEIGHT: 66 IN | DIASTOLIC BLOOD PRESSURE: 84 MMHG | BODY MASS INDEX: 36.92 KG/M2 | HEART RATE: 78 BPM | WEIGHT: 229.69 LBS | RESPIRATION RATE: 18 BRPM | OXYGEN SATURATION: 99 %

## 2023-04-27 DIAGNOSIS — Z79.899 DRUG-INDUCED IMMUNODEFICIENCY: ICD-10-CM

## 2023-04-27 DIAGNOSIS — E66.01 SEVERE OBESITY (BMI 35.0-39.9) WITH COMORBIDITY: ICD-10-CM

## 2023-04-27 DIAGNOSIS — R73.03 PREDIABETES: ICD-10-CM

## 2023-04-27 DIAGNOSIS — Z00.00 WELLNESS EXAMINATION: Primary | ICD-10-CM

## 2023-04-27 DIAGNOSIS — D84.821 DRUG-INDUCED IMMUNODEFICIENCY: ICD-10-CM

## 2023-04-27 PROCEDURE — 3066F NEPHROPATHY DOC TX: CPT | Mod: CPTII,,, | Performed by: INTERNAL MEDICINE

## 2023-04-27 PROCEDURE — 3075F SYST BP GE 130 - 139MM HG: CPT | Mod: CPTII,,, | Performed by: INTERNAL MEDICINE

## 2023-04-27 PROCEDURE — 1160F RVW MEDS BY RX/DR IN RCRD: CPT | Mod: CPTII,,, | Performed by: INTERNAL MEDICINE

## 2023-04-27 PROCEDURE — 3008F PR BODY MASS INDEX (BMI) DOCUMENTED: ICD-10-PCS | Mod: CPTII,,, | Performed by: INTERNAL MEDICINE

## 2023-04-27 PROCEDURE — 99396 PR PREVENTIVE VISIT,EST,40-64: ICD-10-PCS | Mod: ,,, | Performed by: INTERNAL MEDICINE

## 2023-04-27 PROCEDURE — 3075F PR MOST RECENT SYSTOLIC BLOOD PRESS GE 130-139MM HG: ICD-10-PCS | Mod: CPTII,,, | Performed by: INTERNAL MEDICINE

## 2023-04-27 PROCEDURE — 3066F PR DOCUMENTATION OF TREATMENT FOR NEPHROPATHY: ICD-10-PCS | Mod: CPTII,,, | Performed by: INTERNAL MEDICINE

## 2023-04-27 PROCEDURE — 1160F PR REVIEW ALL MEDS BY PRESCRIBER/CLIN PHARMACIST DOCUMENTED: ICD-10-PCS | Mod: CPTII,,, | Performed by: INTERNAL MEDICINE

## 2023-04-27 PROCEDURE — 99396 PREV VISIT EST AGE 40-64: CPT | Mod: ,,, | Performed by: INTERNAL MEDICINE

## 2023-04-27 PROCEDURE — 1159F PR MEDICATION LIST DOCUMENTED IN MEDICAL RECORD: ICD-10-PCS | Mod: CPTII,,, | Performed by: INTERNAL MEDICINE

## 2023-04-27 PROCEDURE — 3079F PR MOST RECENT DIASTOLIC BLOOD PRESSURE 80-89 MM HG: ICD-10-PCS | Mod: CPTII,,, | Performed by: INTERNAL MEDICINE

## 2023-04-27 PROCEDURE — 3079F DIAST BP 80-89 MM HG: CPT | Mod: CPTII,,, | Performed by: INTERNAL MEDICINE

## 2023-04-27 PROCEDURE — 3008F BODY MASS INDEX DOCD: CPT | Mod: CPTII,,, | Performed by: INTERNAL MEDICINE

## 2023-04-27 PROCEDURE — 1159F MED LIST DOCD IN RCRD: CPT | Mod: CPTII,,, | Performed by: INTERNAL MEDICINE

## 2023-04-27 RX ORDER — TIRZEPATIDE 2.5 MG/.5ML
2.5 INJECTION, SOLUTION SUBCUTANEOUS
Qty: 4 PEN | Refills: 11 | Status: SHIPPED | OUTPATIENT
Start: 2023-04-27 | End: 2023-05-05

## 2023-04-27 NOTE — TELEPHONE ENCOUNTER
Please help schedule:  PA for mounjaro, find out cost  , if too pricey patient will [facility]  Please call GI clinic, Dr. Donaldson- see if they can provide samples of IBSRELA for patient

## 2023-04-27 NOTE — PROGRESS NOTES
Subjective:      Patient ID: Maria E Lopez is a 47 y.o. female.    Chief Complaint: Annual Exam    HPI      Patient moved with her 14 year old daughter to [place] from [place].  Prior to now, she was seeing endocrine MD and other's at [facility].  She has two older daughters employed living in [place]. She is . Empolyed at [employer]. No acute concerns        Membranous nephropathy  -nephrology following, diagnosed 2023  -currently taking Cellcept, doing well no acute issues  HTN: compliant with Norvasc 5 mg daily  Hypothyroidism:  -every 3 years US for nodules  -stable with medication  -asx  Chronic Constipation:  -problem since she was 25 year old  -no GI evaluation  -no blood in stool  -Linzess was better for her but still not great, needs higher dose  HLD: on  rosuvastatin 20 mg po daily   Severe Obesity with Comorbidity, HLD: we have discussed patient weight, dietary habits and exercise- she is motivated to lose weight and change nutrition habit-she is working on dietary changes similar to [place] system, she is bike riding for exercise  -she is ready to discuss next step in medication treatment and also work on dietary changes   Microscopic Hematuria: last UA was < 5 rbc's we discussed if this was higher how we would order add'l workup, but not for now    Surgery:  R knee partial knee replacement: 2021, Dr. Kingsley    Mammogram:   Abnormal 2023 screening mammogram  But once compared to images from  she did not need additional testing    PAP smear: 1/2022 normal, request record   *also referral to local GYN placed, Dr. Webb  Colon cancer screening: Dr. Elvis Donaldson 3/11/2022 In chart- no evidence of colon cancer- repeat in 10 yaers 3/11/2032  Flu: 1/27/22  COVID 19: completed 3 doses   Health Maintenance Due   Topic Date Due    Pneumococcal Vaccines (Age 0-64) (1 - PCV) Never done    COVID-19 Vaccine (4 - Booster for Moderna series) 02/15/2022     Review of Systems  "  Constitutional:  Negative for chills and fever.   HENT:  Negative for congestion, sinus pressure and sore throat.    Respiratory:  Negative for cough and shortness of breath.    Cardiovascular:  Negative for chest pain and palpitations.   Gastrointestinal:  Positive for constipation. Negative for abdominal pain and nausea.   Skin:  Negative for rash.     Objective:     Vitals:    04/27/23 0807   BP: 130/84   BP Location: Left arm   Patient Position: Sitting   BP Method: Large (Automatic)   Pulse: 78   Resp: 18   Temp: 97.4 °F (36.3 °C)   TempSrc: Temporal   SpO2: 99%   Weight: 104.2 kg (229 lb 11.2 oz)   Height: 5' 6" (1.676 m)     Physical Exam  Vitals and nursing note reviewed.   Constitutional:       General: She is not in acute distress.     Appearance: She is well-developed. She is not diaphoretic.   HENT:      Head: Normocephalic and atraumatic.   Eyes:      General:         Right eye: No discharge.         Left eye: No discharge.      Conjunctiva/sclera: Conjunctivae normal.      Pupils: Pupils are equal, round, and reactive to light.   Neck:      Thyroid: No thyromegaly.   Cardiovascular:      Rate and Rhythm: Normal rate and regular rhythm.      Heart sounds: Normal heart sounds. No murmur heard.  Pulmonary:      Effort: Pulmonary effort is normal. No respiratory distress.      Breath sounds: Normal breath sounds. No wheezing or rales.   Abdominal:      General: There is no distension.      Palpations: Abdomen is soft.      Tenderness: There is no abdominal tenderness.   Musculoskeletal:      Cervical back: Normal range of motion and neck supple.   Lymphadenopathy:      Cervical: No cervical adenopathy.   Skin:     General: Skin is warm and dry.   Neurological:      Mental Status: She is alert and oriented to person, place, and time.   Psychiatric:         Mood and Affect: Mood normal.         Behavior: Behavior normal.     Assessment/Plan:     1. Wellness examination  Fasting labs reviewed in detail  2. " Drug-induced immunodeficiency  Stable, continue cellcept  Will discuss with nephrology when we can best immunize her for prevnar 20 given she is currently on cellcept  3. Severe obesity (BMI 35.0-39.9) with comorbidity  -     tirzepatide (MOUNJARO) 2.5 mg/0.5 mL PnIj; Inject 2.5 mg into the skin every 7 days.  Dispense: 4 pen; Refill: 11  BMI reviewed  Weight loss with low fat ADA diet advised and regular exercise  Also discussed mounjaro, will work on PA  Advise to titrate up every 4 weeks  4. Prediabetes  -     tirzepatide (MOUNJARO) 2.5 mg/0.5 mL PnIj; Inject 2.5 mg into the skin every 7 days.  Dispense: 4 pen; Refill: 11    Other orders  -     linaCLOtide (LINZESS) 290 mcg Cap capsule; Take 1 capsule (290 mcg total) by mouth Daily.  Dispense: 30 capsule; Refill: 11       Follow up in about 6 months (around 10/27/2023) for Follow Up - Chronic Conditions and 1 year wellness .    This includes face to face time and non-face to face time preparing to see the patient (eg, review of tests), obtaining and/or reviewing separately obtained history, documenting clinical information in the electronic or other health record, independently interpreting results and communicating results to the patient/family/caregiver, or care coordinator.

## 2023-05-01 NOTE — TELEPHONE ENCOUNTER
Spoke with pt  She stated she will contact insurance and try herself   If still not able to get approved she will go to [facility]

## 2023-05-03 ENCOUNTER — LAB VISIT (OUTPATIENT)
Dept: LAB | Facility: HOSPITAL | Age: 48
End: 2023-05-03
Attending: INTERNAL MEDICINE
Payer: COMMERCIAL

## 2023-05-03 DIAGNOSIS — R80.9 PROTEINURIA, UNSPECIFIED TYPE: ICD-10-CM

## 2023-05-03 LAB
ALBUMIN SERPL-MCNC: 3.5 G/DL (ref 3.5–5)
ALBUMIN/GLOB SERPL: 1.2 RATIO (ref 1.1–2)
ALP SERPL-CCNC: 33 UNIT/L (ref 40–150)
ALT SERPL-CCNC: 14 UNIT/L (ref 0–55)
AST SERPL-CCNC: 11 UNIT/L (ref 5–34)
BASOPHILS # BLD AUTO: 0.03 X10(3)/MCL
BASOPHILS NFR BLD AUTO: 0.4 %
BILIRUBIN DIRECT+TOT PNL SERPL-MCNC: 1.2 MG/DL
BUN SERPL-MCNC: 11.3 MG/DL (ref 7–18.7)
C3 SERPL-MCNC: 158 MG/DL (ref 80–173)
C4 SERPL-MCNC: 53.2 MG/DL (ref 13–46)
CALCIUM SERPL-MCNC: 9.1 MG/DL (ref 8.4–10.2)
CHLORIDE SERPL-SCNC: 106 MMOL/L (ref 98–107)
CO2 SERPL-SCNC: 27 MMOL/L (ref 22–29)
CREAT SERPL-MCNC: 0.96 MG/DL (ref 0.55–1.02)
EOSINOPHIL # BLD AUTO: 0.01 X10(3)/MCL (ref 0–0.9)
EOSINOPHIL NFR BLD AUTO: 0.1 %
ERYTHROCYTE [DISTWIDTH] IN BLOOD BY AUTOMATED COUNT: 14.2 % (ref 11.5–17)
GFR SERPLBLD CREATININE-BSD FMLA CKD-EPI: >60 MLS/MIN/1.73/M2
GLOBULIN SER-MCNC: 3 GM/DL (ref 2.4–3.5)
GLUCOSE SERPL-MCNC: 107 MG/DL (ref 74–100)
HBV SURFACE AG SERPL QL IA: NONREACTIVE
HCT VFR BLD AUTO: 44 % (ref 37–47)
HCV AB SERPL QL IA: NONREACTIVE
HGB BLD-MCNC: 13.6 G/DL (ref 12–16)
IMM GRANULOCYTES # BLD AUTO: 0.07 X10(3)/MCL (ref 0–0.04)
IMM GRANULOCYTES NFR BLD AUTO: 0.9 %
LYMPHOCYTES # BLD AUTO: 1.76 X10(3)/MCL (ref 0.6–4.6)
LYMPHOCYTES NFR BLD AUTO: 23.3 %
MCH RBC QN AUTO: 28.6 PG (ref 27–31)
MCHC RBC AUTO-ENTMCNC: 30.9 G/DL (ref 33–36)
MCV RBC AUTO: 92.6 FL (ref 80–94)
MONOCYTES # BLD AUTO: 0.39 X10(3)/MCL (ref 0.1–1.3)
MONOCYTES NFR BLD AUTO: 5.2 %
NEUTROPHILS # BLD AUTO: 5.28 X10(3)/MCL (ref 2.1–9.2)
NEUTROPHILS NFR BLD AUTO: 70.1 %
NRBC BLD AUTO-RTO: 0 %
PLATELET # BLD AUTO: 362 X10(3)/MCL (ref 130–400)
PMV BLD AUTO: 9.7 FL (ref 7.4–10.4)
POTASSIUM SERPL-SCNC: 3.8 MMOL/L (ref 3.5–5.1)
PROT SERPL-MCNC: 6.5 GM/DL (ref 6.4–8.3)
RBC # BLD AUTO: 4.75 X10(6)/MCL (ref 4.2–5.4)
SODIUM SERPL-SCNC: 141 MMOL/L (ref 136–145)
WBC # SPEC AUTO: 7.54 X10(3)/MCL (ref 4.5–11.5)

## 2023-05-03 PROCEDURE — 86160 COMPLEMENT ANTIGEN: CPT | Mod: 91,90

## 2023-05-03 PROCEDURE — 86235 NUCLEAR ANTIGEN ANTIBODY: CPT

## 2023-05-03 PROCEDURE — 36415 COLL VENOUS BLD VENIPUNCTURE: CPT

## 2023-05-03 PROCEDURE — 80053 COMPREHEN METABOLIC PANEL: CPT

## 2023-05-03 PROCEDURE — 86803 HEPATITIS C AB TEST: CPT

## 2023-05-03 PROCEDURE — 87340 HEPATITIS B SURFACE AG IA: CPT

## 2023-05-03 PROCEDURE — 86160 COMPLEMENT ANTIGEN: CPT

## 2023-05-03 PROCEDURE — 85025 COMPLETE CBC W/AUTO DIFF WBC: CPT

## 2023-05-05 ENCOUNTER — PATIENT MESSAGE (OUTPATIENT)
Dept: FAMILY MEDICINE | Facility: CLINIC | Age: 48
End: 2023-05-05
Payer: COMMERCIAL

## 2023-05-05 DIAGNOSIS — K59.09 CHRONIC CONSTIPATION: Primary | ICD-10-CM

## 2023-05-05 DIAGNOSIS — R73.03 PREDIABETES: Primary | ICD-10-CM

## 2023-05-05 DIAGNOSIS — E66.01 SEVERE OBESITY (BMI 35.0-39.9) WITH COMORBIDITY: ICD-10-CM

## 2023-05-05 LAB — SCL-70S AB QUANT (OHS): <0.6 U/ML

## 2023-05-05 RX ORDER — SEMAGLUTIDE 0.68 MG/ML
0.5 INJECTION, SOLUTION SUBCUTANEOUS
Qty: 2 ML | Refills: 11 | Status: SHIPPED | OUTPATIENT
Start: 2023-05-05 | End: 2023-10-12

## 2023-05-05 NOTE — TELEPHONE ENCOUNTER
Please work on PA for ozempic      Medications Ordered This Encounter   Medications    semaglutide (OZEMPIC) 0.25 mg or 0.5 mg (2 mg/3 mL) pen injector     Sig: Inject 0.5 mg into the skin every 7 days.     Dispense:  2 mL     Refill:  11     No orders of the defined types were placed in this encounter.

## 2023-05-05 NOTE — TELEPHONE ENCOUNTER
Pt stated she spoke with insurance it will not cover Mounjaro but will possibly cover Ozempic. Can we try that?    Also waiting on insurance response for Linzess    Please advise  Thanks

## 2023-05-06 LAB
C3 SERPL-MCNC: 168 MG/DL
C4 SERPL-MCNC: 50 MG/DL
NUCLEAR IGG SER QL IA: ABNORMAL
RHEUMATOID FACT SERPL-ACNC: 13 IU/ML

## 2023-05-11 ENCOUNTER — OFFICE VISIT (OUTPATIENT)
Dept: NEPHROLOGY | Facility: CLINIC | Age: 48
End: 2023-05-11
Payer: COMMERCIAL

## 2023-05-11 VITALS
WEIGHT: 229.5 LBS | SYSTOLIC BLOOD PRESSURE: 130 MMHG | TEMPERATURE: 97 F | HEIGHT: 66 IN | RESPIRATION RATE: 20 BRPM | HEART RATE: 72 BPM | BODY MASS INDEX: 36.88 KG/M2 | DIASTOLIC BLOOD PRESSURE: 90 MMHG | OXYGEN SATURATION: 94 %

## 2023-05-11 DIAGNOSIS — R80.9 PROTEINURIA, UNSPECIFIED TYPE: ICD-10-CM

## 2023-05-11 DIAGNOSIS — N05.2 MEMBRANOUS GLOMERULONEPHRITIS: Primary | ICD-10-CM

## 2023-05-11 PROCEDURE — 99999 PR PBB SHADOW E&M-EST. PATIENT-LVL IV: ICD-10-PCS | Mod: PBBFAC,,, | Performed by: INTERNAL MEDICINE

## 2023-05-11 PROCEDURE — 99213 PR OFFICE/OUTPT VISIT, EST, LEVL III, 20-29 MIN: ICD-10-PCS | Mod: S$GLB,,, | Performed by: INTERNAL MEDICINE

## 2023-05-11 PROCEDURE — 99213 OFFICE O/P EST LOW 20 MIN: CPT | Mod: S$GLB,,, | Performed by: INTERNAL MEDICINE

## 2023-05-11 PROCEDURE — 99999 PR PBB SHADOW E&M-EST. PATIENT-LVL IV: CPT | Mod: PBBFAC,,, | Performed by: INTERNAL MEDICINE

## 2023-05-11 PROCEDURE — 3075F PR MOST RECENT SYSTOLIC BLOOD PRESS GE 130-139MM HG: ICD-10-PCS | Mod: CPTII,S$GLB,, | Performed by: INTERNAL MEDICINE

## 2023-05-11 PROCEDURE — 3008F BODY MASS INDEX DOCD: CPT | Mod: CPTII,S$GLB,, | Performed by: INTERNAL MEDICINE

## 2023-05-11 PROCEDURE — 3080F DIAST BP >= 90 MM HG: CPT | Mod: CPTII,S$GLB,, | Performed by: INTERNAL MEDICINE

## 2023-05-11 PROCEDURE — 1159F PR MEDICATION LIST DOCUMENTED IN MEDICAL RECORD: ICD-10-PCS | Mod: CPTII,S$GLB,, | Performed by: INTERNAL MEDICINE

## 2023-05-11 PROCEDURE — 3066F PR DOCUMENTATION OF TREATMENT FOR NEPHROPATHY: ICD-10-PCS | Mod: CPTII,S$GLB,, | Performed by: INTERNAL MEDICINE

## 2023-05-11 PROCEDURE — 3080F PR MOST RECENT DIASTOLIC BLOOD PRESSURE >= 90 MM HG: ICD-10-PCS | Mod: CPTII,S$GLB,, | Performed by: INTERNAL MEDICINE

## 2023-05-11 PROCEDURE — 3066F NEPHROPATHY DOC TX: CPT | Mod: CPTII,S$GLB,, | Performed by: INTERNAL MEDICINE

## 2023-05-11 PROCEDURE — 3008F PR BODY MASS INDEX (BMI) DOCUMENTED: ICD-10-PCS | Mod: CPTII,S$GLB,, | Performed by: INTERNAL MEDICINE

## 2023-05-11 PROCEDURE — 1159F MED LIST DOCD IN RCRD: CPT | Mod: CPTII,S$GLB,, | Performed by: INTERNAL MEDICINE

## 2023-05-11 PROCEDURE — 3075F SYST BP GE 130 - 139MM HG: CPT | Mod: CPTII,S$GLB,, | Performed by: INTERNAL MEDICINE

## 2023-05-11 RX ORDER — PREDNISONE 20 MG/1
20 TABLET ORAL DAILY
Qty: 60 TABLET | Refills: 2 | Status: SHIPPED | OUTPATIENT
Start: 2023-05-11 | End: 2023-07-10 | Stop reason: SDUPTHER

## 2023-05-11 NOTE — PROGRESS NOTES
[facility] Ambulatory Progress Note      HPI  Maria E Lopez, 47 y.o. female, presents to office for a follow up visit for membranous glomerular nephritis.  Patient was seeing another nephrologist who started her on CellCept and steroid I decided to keep her sessions the proteinuria is related gradually coming down she is here for a follow-up visit patient admitted to having palpitation when she takes too much steroid and took it on herself to cut down the prednisone to 20 mg daily  Today she denies any major complaints.  Although her diastolic blood pressure was 90 repeat blood pressure the office within few minutes was down to 130/80    Patient denies taking NSAIDs or new antibiotics. Also denies recent episode of dehydration, diarrhea, vomiting, acute illness, hospitalization, recent angiograms or exposure to IV radiocontrast.        Medical Diagnoses:   Past Medical History:   Diagnosis Date    Chronic constipation     Hashimoto's thyroiditis     Hyperlipidemia     Hypertension     Hypothyroidism     Obesity     Osteoarthritis     Prediabetes      Patient Active Problem List   Diagnosis    Acquired hypothyroidism    Chronic constipation    Severe obesity (BMI 35.0-39.9) with comorbidity    Prediabetes    Microscopic hematuria    Osteoarthritis of right knee    Hypertension    Hyperlipidemia    Other proteinuria    Drug-induced immunodeficiency       Surgical History:   Past Surgical History:   Procedure Laterality Date    ARTHROPLASTY  10/26/2021    BIOPSY OF LESION      replacement of rt knee joint Right 10/26/2021    rt knee arthroscopy         Family History:   Family History   Problem Relation Age of Onset    Thyroid disease Mother     Diabetes Mother     Hypertension Mother     Arthritis Mother     Liver cancer Father     Heart disease Father     COPD Father     Heart disease Paternal Grandfather     Cancer Paternal Grandmother     Stroke Maternal Grandfather        Social History:   Social  History     Tobacco Use    Smoking status: Never     Passive exposure: Never    Smokeless tobacco: Never   Substance Use Topics    Alcohol use: No       Allergies:  Review of patient's allergies indicates:  No Known Allergies    Medications:    Current Outpatient Medications:     amLODIPine (NORVASC) 5 MG tablet, TAKE 1 TABLET BY MOUTH  DAILY, Disp: 90 tablet, Rfl: 3    levonorgestreL (LILETTA) 20.1 mcg/24 hrs (6 yrs) 52 mg IUD, 52 mg by Intrauterine route., Disp: , Rfl:     levothyroxine (SYNTHROID) 100 MCG tablet, TAKE 1 TABLET BY MOUTH  DAILY, Disp: 90 tablet, Rfl: 3    linaCLOtide (LINZESS) 290 mcg Cap capsule, Take 1 capsule (290 mcg total) by mouth Daily., Disp: 30 capsule, Rfl: 11    liothyronine (CYTOMEL) 5 MCG Tab, TAKE 1 TABLET BY MOUTH  DAILY, Disp: 90 tablet, Rfl: 3    mycophenolate (CELLCEPT) 500 mg Tab, Take 500 mg by mouth 2 (two) times daily., Disp: , Rfl:     omeprazole (PRILOSEC) 20 MG capsule, Take by mouth Daily., Disp: , Rfl:     predniSONE (DELTASONE) 20 MG tablet, Take 60 mg by mouth Daily., Disp: , Rfl:     rosuvastatin (CRESTOR) 20 MG tablet, Take 1 tablet (20 mg total) by mouth once daily., Disp: 90 tablet, Rfl: 3    semaglutide (OZEMPIC) 0.25 mg or 0.5 mg (2 mg/3 mL) pen injector, Inject 0.5 mg into the skin every 7 days., Disp: 2 mL, Rfl: 11       Review of Systems:    Constitutional: Denies fever, fatigue, generalized weakness  Skin: Denies wounds, no rashes, no itching, no new skin lesions  Respiratory:  Denies cough, shortness of breath, or wheezing  Cardiovascular: Denies chest pain, palpitations, or swelling  Gastrointestional: Denies abdominal pain, nausea, vomiting, diarrhea, or constipation  Genitourinary: Denies dysuria, hematuria, foamy urine, or incontinence; reports able to empty bladder  Musculoskeletal: Denies back or flank pain  Neurological: Denies headaches, dizziness, paresthesias, tremors or focal weakness      Vital Signs:  BP (!) 130/90 (BP Location: Left arm, Patient  "Position: Sitting)   Pulse 72   Temp 97.3 °F (36.3 °C) (Temporal)   Resp 20   Ht 5' 6" (1.676 m)   Wt 104.1 kg (229 lb 8 oz)   SpO2 (!) 94%   BMI 37.04 kg/m²   Body mass index is 37.04 kg/m².      Physical Exam:    General: no acute distress, awake, alert  Eyes: PERRLA, conjunctiva clear, eyelids without swelling  HENT: atraumatic, oropharynx and nasal mucosa patent  Neck: supple, trache midline, full ROM, no JVD, no thyromegaly or lymphadenopathy  Respiratory: equal, unlabored, clear to auscultation A/P  Cardiovascular: RRR without murmur or rub; radial and pedal pulses +2 BL  Edema: none  Gastrointestinal: soft, non-tender, non-distended; positive bowel sounds; no masses to palpation; no ascites  Genitourinary: no CVA tenderness upon palpation  Musculoskeletal: ROM without new limitation or discomfort  Integumentary: warm, dry; no rashes, wounds, or skin lesions  Neurological: oriented x4, appropriate, no acute deficits; no asterixis      Labs:        Component Value Date/Time     05/03/2023 1559     04/24/2023 1053     09/20/2016 0805     06/16/2015 1016    K 3.8 05/03/2023 1559    K 4.3 04/24/2023 1053    K 4.3 09/20/2016 0805    K 3.9 06/16/2015 1016     09/20/2016 0805     06/16/2015 1016    CO2 27 05/03/2023 1559    CO2 27 04/24/2023 1053    CO2 25 09/20/2016 0805    CO2 24 06/16/2015 1016    BUN 11.3 05/03/2023 1559    BUN 8.9 04/24/2023 1053    BUN 7 09/20/2016 0805    BUN 6 06/16/2015 1016    CREATININE 0.96 05/03/2023 1559    CREATININE 0.78 04/24/2023 1053    CREATININE 0.82 03/29/2023 0918    CREATININE 0.76 02/22/2023 0709    CREATININE 1.0 09/20/2016 0805    CREATININE 0.9 06/16/2015 1016    CREATININE 0.9 11/28/2012 0805    CREATININE 0.7 07/22/2010 0805    CALCIUM 9.1 05/03/2023 1559    CALCIUM 9.0 04/24/2023 1053    CALCIUM 9.0 09/20/2016 0805    CALCIUM 9.2 06/16/2015 1016    PHOS 2.7 03/29/2023 0918           Component Value Date/Time    WBC 7.54 " 05/03/2023 1559    WBC 7.0 04/24/2023 1053    WBC 4.50 09/20/2016 0805    WBC 4.54 06/16/2015 1016    HGB 13.6 05/03/2023 1559    HGB 13.3 04/24/2023 1053    HGB 12.6 09/20/2016 0805    HGB 13.0 06/16/2015 1016    HCT 44.0 05/03/2023 1559    HCT 43.1 04/24/2023 1053    HCT 39.9 09/20/2016 0805    HCT 39.2 06/16/2015 1016     05/03/2023 1559     04/24/2023 1053     09/20/2016 0805     06/16/2015 1016       Urine Creatinine   Date Value Ref Range Status   05/03/2023 165.0 (H) 47.0 - 110.0 mg/dL Final   03/29/2023 68.1 47.0 - 110.0 mg/dL Final       Urine Protein Level   Date Value Ref Range Status   05/03/2023 463.8 mg/dL Final   03/29/2023 163.7 mg/dL Final           Imaging:  Retroperitoneal US:      Impression:    Membranous glomerular nephritis biopsy-proven    Proteinuria gradually decreasing on CellCept and prednisone    Plan:    Since patient is taking only prednisone 20 mg a day we will continue prednisone 20 mg a day for now  Continue same dose of CellCept  Labs in 1 month if stable another set of labs in 2 months and follow her back in 2 months    Patient to call our office with any concerns prior to next appointment.    Avoid NSAIDs (Aleve, Mobic, Celebrex, Ibuprofen, Advil, Toradol and Diclofenac).  Only take tylenol occasionally if needed for aches/pains.    Educated on low sodium diet:  Avoid high salt foods (olives, pickles, smoked meats, deli meats, salted potato chips, etc.).   Do not add salt to your food at the table.   Use only small amounts of salt when cooking.      Kat Gustafson

## 2023-05-15 ENCOUNTER — PATIENT MESSAGE (OUTPATIENT)
Dept: FAMILY MEDICINE | Facility: CLINIC | Age: 48
End: 2023-05-15
Payer: COMMERCIAL

## 2023-05-17 ENCOUNTER — PATIENT OUTREACH (OUTPATIENT)
Dept: ADMINISTRATIVE | Facility: HOSPITAL | Age: 48
End: 2023-05-17
Payer: COMMERCIAL

## 2023-05-17 NOTE — PROGRESS NOTES
Population Health. Out Reach. Reviewing patient's chart for quality metrics.  The following record(s)  below were uploaded for Health Maintenance .         PAP SMEAR   2/14/2023

## 2023-06-12 ENCOUNTER — LAB VISIT (OUTPATIENT)
Dept: LAB | Facility: HOSPITAL | Age: 48
End: 2023-06-12
Attending: INTERNAL MEDICINE
Payer: COMMERCIAL

## 2023-06-12 DIAGNOSIS — N05.2 MEMBRANOUS GLOMERULONEPHRITIS: ICD-10-CM

## 2023-06-12 LAB
APPEARANCE UR: CLEAR
BACTERIA #/AREA URNS AUTO: ABNORMAL /HPF
BILIRUB UR QL STRIP.AUTO: NEGATIVE MG/DL
COLOR UR: ABNORMAL
CREAT UR-MCNC: 83.6 MG/DL (ref 47–110)
GLUCOSE UR QL STRIP.AUTO: NEGATIVE MG/DL
KETONES UR QL STRIP.AUTO: NEGATIVE MG/DL
LEUKOCYTE ESTERASE UR QL STRIP.AUTO: NEGATIVE UNIT/L
NITRITE UR QL STRIP.AUTO: NEGATIVE
PH UR STRIP.AUTO: 6 [PH]
PROT UR QL STRIP.AUTO: 100 MG/DL
PROT UR STRIP-MCNC: 207.1 MG/DL
RBC #/AREA URNS AUTO: ABNORMAL /HPF
RBC UR QL AUTO: ABNORMAL UNIT/L
SP GR UR STRIP.AUTO: 1.02
SQUAMOUS #/AREA URNS AUTO: ABNORMAL /HPF
URINE PROTEIN/CREATININE RATIO (OHS): 2.5
UROBILINOGEN UR STRIP-ACNC: 1 MG/DL
WBC #/AREA URNS AUTO: ABNORMAL /HPF

## 2023-06-12 PROCEDURE — 82570 ASSAY OF URINE CREATININE: CPT

## 2023-06-12 PROCEDURE — 81001 URINALYSIS AUTO W/SCOPE: CPT

## 2023-07-06 ENCOUNTER — LAB VISIT (OUTPATIENT)
Dept: LAB | Facility: HOSPITAL | Age: 48
End: 2023-07-06
Attending: INTERNAL MEDICINE
Payer: COMMERCIAL

## 2023-07-06 DIAGNOSIS — N05.2 MEMBRANOUS GLOMERULONEPHRITIS: ICD-10-CM

## 2023-07-06 LAB
ALBUMIN SERPL-MCNC: 3.4 G/DL (ref 3.5–5)
ALBUMIN/GLOB SERPL: 1.3 RATIO (ref 1.1–2)
ALP SERPL-CCNC: 29 UNIT/L (ref 40–150)
ALT SERPL-CCNC: 16 UNIT/L (ref 0–55)
AST SERPL-CCNC: 13 UNIT/L (ref 5–34)
BASOPHILS # BLD AUTO: 0.04 X10(3)/MCL
BASOPHILS NFR BLD AUTO: 0.5 %
BILIRUBIN DIRECT+TOT PNL SERPL-MCNC: 0.7 MG/DL
BUN SERPL-MCNC: 9.6 MG/DL (ref 7–18.7)
CALCIUM SERPL-MCNC: 9.2 MG/DL (ref 8.4–10.2)
CHLORIDE SERPL-SCNC: 106 MMOL/L (ref 98–107)
CHOLEST SERPL-MCNC: 195 MG/DL
CHOLEST/HDLC SERPL: 2 {RATIO} (ref 0–5)
CO2 SERPL-SCNC: 27 MMOL/L (ref 22–29)
CREAT SERPL-MCNC: 0.89 MG/DL (ref 0.55–1.02)
EOSINOPHIL # BLD AUTO: 0.09 X10(3)/MCL (ref 0–0.9)
EOSINOPHIL NFR BLD AUTO: 1 %
ERYTHROCYTE [DISTWIDTH] IN BLOOD BY AUTOMATED COUNT: 13.2 % (ref 11.5–17)
GFR SERPLBLD CREATININE-BSD FMLA CKD-EPI: >60 MLS/MIN/1.73/M2
GLOBULIN SER-MCNC: 2.7 GM/DL (ref 2.4–3.5)
GLUCOSE SERPL-MCNC: 101 MG/DL (ref 74–100)
HCT VFR BLD AUTO: 41.6 % (ref 37–47)
HDLC SERPL-MCNC: 79 MG/DL (ref 35–60)
HGB BLD-MCNC: 13 G/DL (ref 12–16)
IMM GRANULOCYTES # BLD AUTO: 0.04 X10(3)/MCL (ref 0–0.04)
IMM GRANULOCYTES NFR BLD AUTO: 0.5 %
LDLC SERPL CALC-MCNC: 94 MG/DL (ref 50–140)
LYMPHOCYTES # BLD AUTO: 3.76 X10(3)/MCL (ref 0.6–4.6)
LYMPHOCYTES NFR BLD AUTO: 42.7 %
MCH RBC QN AUTO: 28.9 PG (ref 27–31)
MCHC RBC AUTO-ENTMCNC: 31.3 G/DL (ref 33–36)
MCV RBC AUTO: 92.4 FL (ref 80–94)
MONOCYTES # BLD AUTO: 0.52 X10(3)/MCL (ref 0.1–1.3)
MONOCYTES NFR BLD AUTO: 5.9 %
NEUTROPHILS # BLD AUTO: 4.35 X10(3)/MCL (ref 2.1–9.2)
NEUTROPHILS NFR BLD AUTO: 49.4 %
NRBC BLD AUTO-RTO: 0 %
PHOSPHATE SERPL-MCNC: 4.4 MG/DL (ref 2.3–4.7)
PLATELET # BLD AUTO: 308 X10(3)/MCL (ref 130–400)
PMV BLD AUTO: 9.8 FL (ref 7.4–10.4)
POTASSIUM SERPL-SCNC: 4 MMOL/L (ref 3.5–5.1)
PROT SERPL-MCNC: 6.1 GM/DL (ref 6.4–8.3)
RBC # BLD AUTO: 4.5 X10(6)/MCL (ref 4.2–5.4)
SODIUM SERPL-SCNC: 144 MMOL/L (ref 136–145)
TRIGL SERPL-MCNC: 111 MG/DL (ref 37–140)
VLDLC SERPL CALC-MCNC: 22 MG/DL
WBC # SPEC AUTO: 8.8 X10(3)/MCL (ref 4.5–11.5)

## 2023-07-06 PROCEDURE — 85025 COMPLETE CBC W/AUTO DIFF WBC: CPT

## 2023-07-06 PROCEDURE — 80053 COMPREHEN METABOLIC PANEL: CPT

## 2023-07-06 PROCEDURE — 36415 COLL VENOUS BLD VENIPUNCTURE: CPT

## 2023-07-06 PROCEDURE — 80061 LIPID PANEL: CPT

## 2023-07-06 PROCEDURE — 84100 ASSAY OF PHOSPHORUS: CPT

## 2023-07-10 ENCOUNTER — TELEPHONE (OUTPATIENT)
Dept: FAMILY MEDICINE | Facility: CLINIC | Age: 48
End: 2023-07-10
Payer: COMMERCIAL

## 2023-07-10 ENCOUNTER — OFFICE VISIT (OUTPATIENT)
Dept: NEPHROLOGY | Facility: CLINIC | Age: 48
End: 2023-07-10
Payer: COMMERCIAL

## 2023-07-10 VITALS
HEART RATE: 82 BPM | SYSTOLIC BLOOD PRESSURE: 138 MMHG | OXYGEN SATURATION: 94 % | DIASTOLIC BLOOD PRESSURE: 90 MMHG | BODY MASS INDEX: 38.72 KG/M2 | WEIGHT: 240.94 LBS | RESPIRATION RATE: 20 BRPM | HEIGHT: 66 IN | TEMPERATURE: 98 F

## 2023-07-10 DIAGNOSIS — N05.2 MEMBRANOUS GLOMERULONEPHRITIS: Primary | ICD-10-CM

## 2023-07-10 DIAGNOSIS — I10 HYPERTENSION, UNSPECIFIED TYPE: ICD-10-CM

## 2023-07-10 DIAGNOSIS — E03.9 HYPOTHYROIDISM, UNSPECIFIED TYPE: ICD-10-CM

## 2023-07-10 DIAGNOSIS — R80.9 PROTEINURIA, UNSPECIFIED TYPE: ICD-10-CM

## 2023-07-10 DIAGNOSIS — E03.9 ACQUIRED HYPOTHYROIDISM: ICD-10-CM

## 2023-07-10 PROCEDURE — 99999 PR PBB SHADOW E&M-EST. PATIENT-LVL IV: CPT | Mod: PBBFAC,,, | Performed by: INTERNAL MEDICINE

## 2023-07-10 PROCEDURE — 3066F PR DOCUMENTATION OF TREATMENT FOR NEPHROPATHY: ICD-10-PCS | Mod: CPTII,S$GLB,, | Performed by: INTERNAL MEDICINE

## 2023-07-10 PROCEDURE — 3075F SYST BP GE 130 - 139MM HG: CPT | Mod: CPTII,S$GLB,, | Performed by: INTERNAL MEDICINE

## 2023-07-10 PROCEDURE — 3008F BODY MASS INDEX DOCD: CPT | Mod: CPTII,S$GLB,, | Performed by: INTERNAL MEDICINE

## 2023-07-10 PROCEDURE — 3066F NEPHROPATHY DOC TX: CPT | Mod: CPTII,S$GLB,, | Performed by: INTERNAL MEDICINE

## 2023-07-10 PROCEDURE — 1159F PR MEDICATION LIST DOCUMENTED IN MEDICAL RECORD: ICD-10-PCS | Mod: CPTII,S$GLB,, | Performed by: INTERNAL MEDICINE

## 2023-07-10 PROCEDURE — 3080F PR MOST RECENT DIASTOLIC BLOOD PRESSURE >= 90 MM HG: ICD-10-PCS | Mod: CPTII,S$GLB,, | Performed by: INTERNAL MEDICINE

## 2023-07-10 PROCEDURE — 3008F PR BODY MASS INDEX (BMI) DOCUMENTED: ICD-10-PCS | Mod: CPTII,S$GLB,, | Performed by: INTERNAL MEDICINE

## 2023-07-10 PROCEDURE — 99214 PR OFFICE/OUTPT VISIT, EST, LEVL IV, 30-39 MIN: ICD-10-PCS | Mod: S$GLB,,, | Performed by: INTERNAL MEDICINE

## 2023-07-10 PROCEDURE — 1159F MED LIST DOCD IN RCRD: CPT | Mod: CPTII,S$GLB,, | Performed by: INTERNAL MEDICINE

## 2023-07-10 PROCEDURE — 99214 OFFICE O/P EST MOD 30 MIN: CPT | Mod: S$GLB,,, | Performed by: INTERNAL MEDICINE

## 2023-07-10 PROCEDURE — 3075F PR MOST RECENT SYSTOLIC BLOOD PRESS GE 130-139MM HG: ICD-10-PCS | Mod: CPTII,S$GLB,, | Performed by: INTERNAL MEDICINE

## 2023-07-10 PROCEDURE — 99999 PR PBB SHADOW E&M-EST. PATIENT-LVL IV: ICD-10-PCS | Mod: PBBFAC,,, | Performed by: INTERNAL MEDICINE

## 2023-07-10 PROCEDURE — 3080F DIAST BP >= 90 MM HG: CPT | Mod: CPTII,S$GLB,, | Performed by: INTERNAL MEDICINE

## 2023-07-10 RX ORDER — PREDNISONE 20 MG/1
10 TABLET ORAL DAILY
Qty: 90 TABLET | Refills: 3 | Status: SHIPPED | OUTPATIENT
Start: 2023-07-10 | End: 2023-10-12 | Stop reason: SDUPTHER

## 2023-07-10 RX ORDER — LEVOTHYROXINE SODIUM 100 UG/1
100 TABLET ORAL DAILY
Qty: 90 TABLET | Refills: 3 | Status: SHIPPED | OUTPATIENT
Start: 2023-07-10

## 2023-07-10 RX ORDER — LIOTHYRONINE SODIUM 5 UG/1
5 TABLET ORAL DAILY
Qty: 90 TABLET | Refills: 3 | Status: SHIPPED | OUTPATIENT
Start: 2023-07-10

## 2023-07-10 RX ORDER — LISINOPRIL 10 MG/1
10 TABLET ORAL DAILY
Qty: 90 TABLET | Refills: 3 | Status: SHIPPED | OUTPATIENT
Start: 2023-07-10 | End: 2023-11-07 | Stop reason: CLARIF

## 2023-07-10 NOTE — TELEPHONE ENCOUNTER
----- Message from Dinesh Jax sent at 7/10/2023 10:46 AM CDT -----  .Type:  RX Refill Request    Who Called: pt  Refill or New Rx:Refill  RX Name and Strength:levothyroxine (SYNTHROID) 100 MCG tablet  How is the patient currently taking it? (ex. 1XDay):1xday  Is this a 30 day or 90 day RX:90 day  Preferred Pharmacy with phone number:[facility] [place] - [address]  Local or Mail Order:local  Ordering Provider:  Would the patient rather a call back or a response via [employer]? Call back  Best Call Back Number:[phone]   Additional Information: pt is out of medication     .Type:  RX Refill Request    Who Called: pt  Refill or New Rx:Refill  RX Name and Strength:liothyronine (CYTOMEL) 5 MCG Tab  How is the patient currently taking it? (ex. 1XDay):1xday  Is this a 30 day or 90 day RX:90 day  Preferred Pharm[facility] with phone number[facility] [id number] [place] [facility] [address]  Local or Mail Order:local  Ordering Provider:  Would the patient rather a call back or a response via [facility]? Call back  Best Call Back Number:[phone]   Additional Information: pt is out of medication

## 2023-07-10 NOTE — PROGRESS NOTES
[facility] Progress [facility]      HPI  Maria E Lopez, 47 y.o. female, presents to office for a follow up visit .  Patient with biopsy-proven membranous glomerular nephritis.  She was started on CellCept and prednisone she was seen another nephrologist and she switched to me so I decided continue such since proteinuria is coming down and as a matter of fact her spot protein to creatinine ratio suggestive now down to 1 g of protein per 24 hours we have been gradually weaning slowly her prednisone.  Patient has been complaining of occasional lower extremity edema    Patient denies taking NSAIDs or new antibiotics. Also denies recent episode of dehydration, diarrhea, vomiting, acute illness, hospitalization, recent angiograms or exposure to IV radiocontrast.        Medical Diagnoses:   Past Medical History:   Diagnosis Date    Chronic constipation     Hashimoto's thyroiditis     Hyperlipidemia     Hypertension     Hypothyroidism     Obesity     Osteoarthritis     Prediabetes      Patient Active Problem List   Diagnosis    Acquired hypothyroidism    Chronic constipation    Severe obesity (BMI 35.0-39.9) with comorbidity    Prediabetes    Microscopic hematuria    Osteoarthritis of right knee    Hypertension    Hyperlipidemia    Other proteinuria    Drug-induced immunodeficiency       Surgical History:   Past Surgical History:   Procedure Laterality Date    ARTHROPLASTY  10/26/2021    BIOPSY OF LESION      replacement of rt knee joint Right 10/26/2021    rt knee arthroscopy         Family History:   Family History   Problem Relation Age of Onset    Thyroid disease Mother     Diabetes Mother     Hypertension Mother     Arthritis Mother     Liver cancer Father     Heart disease Father     COPD Father     Heart disease Paternal Grandfather     Cancer Paternal Grandmother     Stroke Maternal Grandfather        Social History:   Social History     Tobacco Use    Smoking status: Never     Passive exposure:  Never    Smokeless tobacco: Never   Substance Use Topics    Alcohol use: No       Allergies:  Review of patient's allergies indicates:  No Known Allergies    Medications:    Current Outpatient Medications:     levonorgestreL (LILETTA) 20.1 mcg/24 hrs (6 yrs) 52 mg IUD, 52 mg by Intrauterine route., Disp: , Rfl:     levothyroxine (SYNTHROID) 100 MCG tablet, TAKE 1 TABLET BY MOUTH  DAILY, Disp: 90 tablet, Rfl: 3    linaCLOtide (LINZESS) 290 mcg Cap capsule, Take 1 capsule (290 mcg total) by mouth Daily., Disp: 30 capsule, Rfl: 11    liothyronine (CYTOMEL) 5 MCG Tab, TAKE 1 TABLET BY MOUTH  DAILY, Disp: 90 tablet, Rfl: 3    mycophenolate (CELLCEPT) 500 mg Tab, Take 500 mg by mouth 2 (two) times daily., Disp: , Rfl:     omeprazole (PRILOSEC) 20 MG capsule, Take by mouth Daily., Disp: , Rfl:     rosuvastatin (CRESTOR) 20 MG tablet, Take 1 tablet (20 mg total) by mouth once daily., Disp: 90 tablet, Rfl: 3    lisinopriL 10 MG tablet, Take 1 tablet (10 mg total) by mouth once daily., Disp: 90 tablet, Rfl: 3    predniSONE (DELTASONE) 20 MG tablet, Take 0.5 tablets (10 mg total) by mouth Daily., Disp: 90 tablet, Rfl: 3    semaglutide (OZEMPIC) 0.25 mg or 0.5 mg (2 mg/3 mL) pen injector, Inject 0.5 mg into the skin every 7 days. (Patient not taking: Reported on 7/10/2023), Disp: 2 mL, Rfl: 11       Review of Systems:    Constitutional: Denies fever, fatigue, generalized weakness  Skin: Denies wounds, no rashes, no itching, no new skin lesions  Respiratory:  Denies cough, shortness of breath, or wheezing  Cardiovascular: Denies chest pain, palpitations, occasional lower extremity edema  Gastrointestional: Denies abdominal pain, nausea, vomiting, diarrhea, or constipation  Genitourinary: Denies dysuria, hematuria, foamy urine, or incontinence; reports able to empty bladder  Musculoskeletal: Denies back or flank pain  Neurological: Denies headaches, dizziness, paresthesias, tremors or focal weakness      Vital Signs:  BP (!)  "138/90 (BP Location: Right arm, Patient Position: Sitting)   Pulse 82   Temp 97.9 °F (36.6 °C) (Temporal)   Resp 20   Ht 5' 6" (1.676 m)   Wt 109.3 kg (240 lb 15.4 oz)   SpO2 (!) 94%   BMI 38.89 kg/m²   Body mass index is 38.89 kg/m².      Physical Exam:    General: no acute distress, awake, alert  Eyes: conjunctiva clear, eyelids without swelling  HENT: atraumatic, oropharynx and nasal mucosa patent  Neck: supple, trache midline, no JVD  Respiratory: equal, unlabored, clear to auscultation A/P  Cardiovascular: RRR without murmur or rub  Edema: none  Gastrointestinal: soft, non-tender, non-distended; positive bowel sounds; no masses to palpation; no ascites  Genitourinary: no CVA tenderness upon palpation  Musculoskeletal: ROM without new limitation or discomfort  Integumentary: warm, dry; no rashes, wounds, or skin lesions  Neurological: oriented x4, appropriate, no acute deficits; no asterixis      Labs:        Component Value Date/Time     07/06/2023 0814     06/12/2023 1543     09/20/2016 0805     06/16/2015 1016    K 4.0 07/06/2023 0814    K 4.1 06/12/2023 1543    K 4.3 09/20/2016 0805    K 3.9 06/16/2015 1016     09/20/2016 0805     06/16/2015 1016    CO2 27 07/06/2023 0814    CO2 26 06/12/2023 1543    CO2 25 09/20/2016 0805    CO2 24 06/16/2015 1016    BUN 9.6 07/06/2023 0814    BUN 12.4 06/12/2023 1543    BUN 7 09/20/2016 0805    BUN 6 06/16/2015 1016    CREATININE 0.89 07/06/2023 0814    CREATININE 0.87 06/12/2023 1543    CREATININE 0.96 05/03/2023 1559    CREATININE 0.78 04/24/2023 1053    CREATININE 1.0 09/20/2016 0805    CREATININE 0.9 06/16/2015 1016    CREATININE 0.9 11/28/2012 0805    CREATININE 0.7 07/22/2010 0805    CALCIUM 9.2 07/06/2023 0814    CALCIUM 9.6 06/12/2023 1543    CALCIUM 9.0 09/20/2016 0805    CALCIUM 9.2 06/16/2015 1016    PHOS 4.4 07/06/2023 0814    PHOS 2.7 03/29/2023 0918           Component Value Date/Time    WBC 8.80 07/06/2023 0814    " WBC 7.54 05/03/2023 1559    WBC 4.50 09/20/2016 0805    WBC 4.54 06/16/2015 1016    HGB 13.0 07/06/2023 0814    HGB 13.6 05/03/2023 1559    HGB 12.6 09/20/2016 0805    HGB 13.0 06/16/2015 1016    HCT 41.6 07/06/2023 0814    HCT 44.0 05/03/2023 1559    HCT 39.9 09/20/2016 0805    HCT 39.2 06/16/2015 1016     07/06/2023 0814     05/03/2023 1559     09/20/2016 0805     06/16/2015 1016       Urine Creatinine   Date Value Ref Range Status   07/06/2023 131.3 (H) 47.0 - 110.0 mg/dL Final   06/12/2023 83.6 47.0 - 110.0 mg/dL Final       Urine Protein Level   Date Value Ref Range Status   07/06/2023 135.1 mg/dL Final   06/12/2023 207.1 mg/dL Final           Imaging:  Retroperitoneal US:      Impression:    1. Membranous glomerulonephritis        2. Proteinuria, unspecified type          There seems to be some response to CellCept and prednisone combination  Hypertension not at target.  Mild edema could be related to amlodipine.    Plan:  Prefer to put patient on ACE inhibitor especially in view of the proteinuria  We will start weaning the prednisone to cut it down to 10 mg p.o. daily  Since patient is started on ACE repeat labs in 2 weeks  If all good we will follow-up visit and repeat labs again in 3 months        Kat Gustafson      This note was created with the assistance of [employer] voice recognition software or phone dictation. There may be transcription errors as a result of using this technology however minimal. Effort has been made to assure accuracy of transcription but any obvious errors or omissions should be clarified with the author of the document

## 2023-08-07 ENCOUNTER — PATIENT MESSAGE (OUTPATIENT)
Dept: FAMILY MEDICINE | Facility: CLINIC | Age: 48
End: 2023-08-07
Payer: COMMERCIAL

## 2023-08-07 DIAGNOSIS — E78.5 HYPERLIPIDEMIA, UNSPECIFIED HYPERLIPIDEMIA TYPE: Primary | ICD-10-CM

## 2023-08-07 DIAGNOSIS — K59.09 CHRONIC CONSTIPATION: ICD-10-CM

## 2023-08-07 RX ORDER — ROSUVASTATIN CALCIUM 20 MG/1
20 TABLET, COATED ORAL DAILY
Qty: 90 TABLET | Refills: 1 | Status: SHIPPED | OUTPATIENT
Start: 2023-08-07 | End: 2024-02-22

## 2023-10-03 ENCOUNTER — LAB VISIT (OUTPATIENT)
Dept: LAB | Facility: HOSPITAL | Age: 48
End: 2023-10-03
Attending: INTERNAL MEDICINE
Payer: COMMERCIAL

## 2023-10-03 DIAGNOSIS — N05.2 MEMBRANOUS GLOMERULONEPHRITIS: ICD-10-CM

## 2023-10-03 LAB
APPEARANCE UR: ABNORMAL
BACTERIA #/AREA URNS AUTO: ABNORMAL /HPF
BILIRUB UR QL STRIP.AUTO: NEGATIVE
COLOR UR AUTO: ABNORMAL
CREAT UR-MCNC: 154.1 MG/DL (ref 45–106)
GLUCOSE UR QL STRIP.AUTO: NEGATIVE
KETONES UR QL STRIP.AUTO: NEGATIVE
LEUKOCYTE ESTERASE UR QL STRIP.AUTO: ABNORMAL
NITRITE UR QL STRIP.AUTO: NEGATIVE
PH UR STRIP.AUTO: 6 [PH]
PROT UR QL STRIP.AUTO: NEGATIVE
PROT UR STRIP-MCNC: 11.2 MG/DL
RBC #/AREA URNS AUTO: ABNORMAL /HPF
RBC UR QL AUTO: ABNORMAL
SP GR UR STRIP.AUTO: 1.02 (ref 1–1.03)
SQUAMOUS #/AREA URNS AUTO: ABNORMAL /HPF
T VAGINALIS URNS QL MICRO: ABNORMAL /HPF
URINE PROTEIN/CREATININE RATIO (OHS): 0.1
UROBILINOGEN UR STRIP-ACNC: 0.2
WBC #/AREA URNS AUTO: ABNORMAL /HPF

## 2023-10-03 PROCEDURE — 81001 URINALYSIS AUTO W/SCOPE: CPT

## 2023-10-03 PROCEDURE — 82570 ASSAY OF URINE CREATININE: CPT

## 2023-10-05 DIAGNOSIS — N05.2 MEMBRANOUS GLOMERULONEPHRITIS: Primary | ICD-10-CM

## 2023-10-09 ENCOUNTER — PATIENT MESSAGE (OUTPATIENT)
Dept: FAMILY MEDICINE | Facility: CLINIC | Age: 48
End: 2023-10-09
Payer: COMMERCIAL

## 2023-10-09 ENCOUNTER — LAB VISIT (OUTPATIENT)
Dept: LAB | Facility: HOSPITAL | Age: 48
End: 2023-10-09
Attending: INTERNAL MEDICINE
Payer: COMMERCIAL

## 2023-10-09 DIAGNOSIS — N05.2 MEMBRANOUS GLOMERULONEPHRITIS: ICD-10-CM

## 2023-10-09 DIAGNOSIS — R73.03 PREDIABETES: ICD-10-CM

## 2023-10-09 DIAGNOSIS — E66.01 SEVERE OBESITY (BMI 35.0-39.9) WITH COMORBIDITY: ICD-10-CM

## 2023-10-09 LAB
ALBUMIN SERPL-MCNC: 3.7 G/DL (ref 3.5–5)
ALBUMIN/GLOB SERPL: 1.4 RATIO (ref 1.1–2)
ALP SERPL-CCNC: 36 UNIT/L (ref 40–150)
ALT SERPL-CCNC: 19 UNIT/L (ref 0–55)
AST SERPL-CCNC: 16 UNIT/L (ref 5–34)
BASOPHILS # BLD AUTO: 0.03 X10(3)/MCL
BASOPHILS NFR BLD AUTO: 0.6 %
BILIRUB SERPL-MCNC: 1.7 MG/DL
BUN SERPL-MCNC: 4.6 MG/DL (ref 7–18.7)
CALCIUM SERPL-MCNC: 9.2 MG/DL (ref 8.4–10.2)
CHLORIDE SERPL-SCNC: 107 MMOL/L (ref 98–107)
CO2 SERPL-SCNC: 28 MMOL/L (ref 22–29)
CREAT SERPL-MCNC: 0.82 MG/DL (ref 0.55–1.02)
EOSINOPHIL # BLD AUTO: 0.15 X10(3)/MCL (ref 0–0.9)
EOSINOPHIL NFR BLD AUTO: 2.9 %
ERYTHROCYTE [DISTWIDTH] IN BLOOD BY AUTOMATED COUNT: 13.9 % (ref 11.5–17)
GFR SERPLBLD CREATININE-BSD FMLA CKD-EPI: >60 MLS/MIN/1.73/M2
GLOBULIN SER-MCNC: 2.7 GM/DL (ref 2.4–3.5)
GLUCOSE SERPL-MCNC: 112 MG/DL (ref 74–100)
HCT VFR BLD AUTO: 41.8 % (ref 37–47)
HGB BLD-MCNC: 13.2 G/DL (ref 12–16)
IMM GRANULOCYTES # BLD AUTO: 0.04 X10(3)/MCL (ref 0–0.04)
IMM GRANULOCYTES NFR BLD AUTO: 0.8 %
LYMPHOCYTES # BLD AUTO: 2.36 X10(3)/MCL (ref 0.6–4.6)
LYMPHOCYTES NFR BLD AUTO: 45.4 %
MCH RBC QN AUTO: 28.8 PG (ref 27–31)
MCHC RBC AUTO-ENTMCNC: 31.6 G/DL (ref 33–36)
MCV RBC AUTO: 91.1 FL (ref 80–94)
MONOCYTES # BLD AUTO: 0.42 X10(3)/MCL (ref 0.1–1.3)
MONOCYTES NFR BLD AUTO: 8.1 %
NEUTROPHILS # BLD AUTO: 2.2 X10(3)/MCL (ref 2.1–9.2)
NEUTROPHILS NFR BLD AUTO: 42.2 %
NRBC BLD AUTO-RTO: 0 %
PLATELET # BLD AUTO: 261 X10(3)/MCL (ref 130–400)
PMV BLD AUTO: 9.8 FL (ref 7.4–10.4)
POTASSIUM SERPL-SCNC: 4.2 MMOL/L (ref 3.5–5.1)
PROT SERPL-MCNC: 6.4 GM/DL (ref 6.4–8.3)
RBC # BLD AUTO: 4.59 X10(6)/MCL (ref 4.2–5.4)
SODIUM SERPL-SCNC: 144 MMOL/L (ref 136–145)
WBC # SPEC AUTO: 5.2 X10(3)/MCL (ref 4.5–11.5)

## 2023-10-09 PROCEDURE — 85025 COMPLETE CBC W/AUTO DIFF WBC: CPT

## 2023-10-09 PROCEDURE — 80053 COMPREHEN METABOLIC PANEL: CPT

## 2023-10-09 PROCEDURE — 36415 COLL VENOUS BLD VENIPUNCTURE: CPT

## 2023-10-12 ENCOUNTER — OFFICE VISIT (OUTPATIENT)
Dept: NEPHROLOGY | Facility: CLINIC | Age: 48
End: 2023-10-12
Payer: COMMERCIAL

## 2023-10-12 VITALS
TEMPERATURE: 97 F | OXYGEN SATURATION: 96 % | HEIGHT: 66 IN | HEART RATE: 86 BPM | RESPIRATION RATE: 20 BRPM | SYSTOLIC BLOOD PRESSURE: 118 MMHG | WEIGHT: 241.81 LBS | BODY MASS INDEX: 38.86 KG/M2 | DIASTOLIC BLOOD PRESSURE: 80 MMHG

## 2023-10-12 DIAGNOSIS — R80.9 PROTEINURIA, UNSPECIFIED TYPE: Primary | ICD-10-CM

## 2023-10-12 DIAGNOSIS — N05.2 MEMBRANOUS GLOMERULONEPHRITIS: ICD-10-CM

## 2023-10-12 PROCEDURE — 3044F PR MOST RECENT HEMOGLOBIN A1C LEVEL <7.0%: ICD-10-PCS | Mod: CPTII,S$GLB,, | Performed by: INTERNAL MEDICINE

## 2023-10-12 PROCEDURE — 99213 PR OFFICE/OUTPT VISIT, EST, LEVL III, 20-29 MIN: ICD-10-PCS | Mod: S$GLB,,, | Performed by: INTERNAL MEDICINE

## 2023-10-12 PROCEDURE — 99999 PR PBB SHADOW E&M-EST. PATIENT-LVL IV: CPT | Mod: PBBFAC,,, | Performed by: INTERNAL MEDICINE

## 2023-10-12 PROCEDURE — 3044F HG A1C LEVEL LT 7.0%: CPT | Mod: CPTII,S$GLB,, | Performed by: INTERNAL MEDICINE

## 2023-10-12 PROCEDURE — 3008F PR BODY MASS INDEX (BMI) DOCUMENTED: ICD-10-PCS | Mod: CPTII,S$GLB,, | Performed by: INTERNAL MEDICINE

## 2023-10-12 PROCEDURE — 3079F PR MOST RECENT DIASTOLIC BLOOD PRESSURE 80-89 MM HG: ICD-10-PCS | Mod: CPTII,S$GLB,, | Performed by: INTERNAL MEDICINE

## 2023-10-12 PROCEDURE — 4010F PR ACE/ARB THEARPY RXD/TAKEN: ICD-10-PCS | Mod: CPTII,S$GLB,, | Performed by: INTERNAL MEDICINE

## 2023-10-12 PROCEDURE — 3008F BODY MASS INDEX DOCD: CPT | Mod: CPTII,S$GLB,, | Performed by: INTERNAL MEDICINE

## 2023-10-12 PROCEDURE — 1159F PR MEDICATION LIST DOCUMENTED IN MEDICAL RECORD: ICD-10-PCS | Mod: CPTII,S$GLB,, | Performed by: INTERNAL MEDICINE

## 2023-10-12 PROCEDURE — 99999 PR PBB SHADOW E&M-EST. PATIENT-LVL IV: ICD-10-PCS | Mod: PBBFAC,,, | Performed by: INTERNAL MEDICINE

## 2023-10-12 PROCEDURE — 3066F NEPHROPATHY DOC TX: CPT | Mod: CPTII,S$GLB,, | Performed by: INTERNAL MEDICINE

## 2023-10-12 PROCEDURE — 3079F DIAST BP 80-89 MM HG: CPT | Mod: CPTII,S$GLB,, | Performed by: INTERNAL MEDICINE

## 2023-10-12 PROCEDURE — 3074F SYST BP LT 130 MM HG: CPT | Mod: CPTII,S$GLB,, | Performed by: INTERNAL MEDICINE

## 2023-10-12 PROCEDURE — 3066F PR DOCUMENTATION OF TREATMENT FOR NEPHROPATHY: ICD-10-PCS | Mod: CPTII,S$GLB,, | Performed by: INTERNAL MEDICINE

## 2023-10-12 PROCEDURE — 3074F PR MOST RECENT SYSTOLIC BLOOD PRESSURE < 130 MM HG: ICD-10-PCS | Mod: CPTII,S$GLB,, | Performed by: INTERNAL MEDICINE

## 2023-10-12 PROCEDURE — 99213 OFFICE O/P EST LOW 20 MIN: CPT | Mod: S$GLB,,, | Performed by: INTERNAL MEDICINE

## 2023-10-12 PROCEDURE — 4010F ACE/ARB THERAPY RXD/TAKEN: CPT | Mod: CPTII,S$GLB,, | Performed by: INTERNAL MEDICINE

## 2023-10-12 PROCEDURE — 1159F MED LIST DOCD IN RCRD: CPT | Mod: CPTII,S$GLB,, | Performed by: INTERNAL MEDICINE

## 2023-10-12 RX ORDER — PREDNISONE 20 MG/1
10 TABLET ORAL EVERY OTHER DAY
Qty: 90 TABLET | Refills: 3 | Status: SHIPPED | OUTPATIENT
Start: 2023-10-12 | End: 2024-03-12

## 2023-10-12 NOTE — TELEPHONE ENCOUNTER
Ana Lilia Ontiveros prescribed Maunjaro for me but my insurance would not pay. I went on the website and was eligible for a discount. I would like to get it without using my insurance. I have to change all my prescriptions to [facility]. Can I have the prescription for Maunjaro sent to the [facility] [address]?      Thank You?  Maria E Lopez   1975  [phone]

## 2023-10-12 NOTE — PROGRESS NOTES
[facility] Progress Note      HPI  Maria E Lopez, 47 y.o. female, presents to office for a follow up visit for biopsy-proven membranous nephropathy.  Patient did respond to CellCept steroid combination we have been gradually weaning off the prednisone and put her on ACE inhibitor repeat renal function is very stable and proteinuria down to 100 mg.  Patient is feeling fine denies any major complaints.    Patient denies taking NSAIDs or new antibiotics. Also denies recent episode of dehydration, diarrhea, vomiting, acute illness, hospitalization, recent angiograms or exposure to IV radiocontrast.        Medical Diagnoses:   Past Medical History:   Diagnosis Date    Chronic constipation     Hashimoto's thyroiditis     Hyperlipidemia     Hypertension     Hypothyroidism     Obesity     Osteoarthritis     Prediabetes      Patient Active Problem List   Diagnosis    Acquired hypothyroidism    Chronic constipation    Severe obesity (BMI 35.0-39.9) with comorbidity    Prediabetes    Microscopic hematuria    Osteoarthritis of right knee    Hypertension    Hyperlipidemia    Other proteinuria    Drug-induced immunodeficiency       Surgical History:   Past Surgical History:   Procedure Laterality Date    ARTHROPLASTY  10/26/2021    BIOPSY OF LESION      replacement of rt knee joint Right 10/26/2021    rt knee arthroscopy         Family History:   Family History   Problem Relation Age of Onset    Thyroid disease Mother     Diabetes Mother     Hypertension Mother     Arthritis Mother     Liver cancer Father     Heart disease Father     COPD Father     Heart disease Paternal Grandfather     Cancer Paternal Grandmother     Stroke Maternal Grandfather        Social History:   Social History     Tobacco Use    Smoking status: Never     Passive exposure: Never    Smokeless tobacco: Never   Substance Use Topics    Alcohol use: No       Allergies:  Review of patient's allergies indicates:  No Known  "Allergies    Medications:    Current Outpatient Medications:     levonorgestreL (LILETTA) 20.1 mcg/24 hrs (6 yrs) 52 mg IUD, 52 mg by Intrauterine route., Disp: , Rfl:     levothyroxine (SYNTHROID) 100 MCG tablet, Take 1 tablet (100 mcg total) by mouth once daily., Disp: 90 tablet, Rfl: 3    linaCLOtide (LINZESS) 290 mcg Cap capsule, Take 1 capsule (290 mcg total) by mouth Daily., Disp: 90 capsule, Rfl: 1    liothyronine (CYTOMEL) 5 MCG Tab, Take 1 tablet (5 mcg total) by mouth once daily., Disp: 90 tablet, Rfl: 3    lisinopriL 10 MG tablet, Take 1 tablet (10 mg total) by mouth once daily., Disp: 90 tablet, Rfl: 3    mycophenolate (CELLCEPT) 500 mg Tab, Take 500 mg by mouth 2 (two) times daily., Disp: , Rfl:     omeprazole (PRILOSEC) 20 MG capsule, Take by mouth Daily., Disp: , Rfl:     predniSONE (DELTASONE) 20 MG tablet, Take 0.5 tablets (10 mg total) by mouth Daily., Disp: 90 tablet, Rfl: 3    rosuvastatin (CRESTOR) 20 MG tablet, Take 1 tablet (20 mg total) by mouth once daily., Disp: 90 tablet, Rfl: 1    semaglutide (OZEMPIC) 0.25 mg or 0.5 mg (2 mg/3 mL) pen injector, Inject 0.5 mg into the skin every 7 days. (Patient not taking: Reported on 7/10/2023), Disp: 2 mL, Rfl: 11       Review of Systems:    Constitutional: Denies fever, fatigue, generalized weakness  Skin: Denies wounds, no rashes, no itching, no new skin lesions  Respiratory:  Denies cough, shortness of breath, or wheezing  Cardiovascular: Denies chest pain, palpitations, or swelling  Gastrointestional: Denies abdominal pain, nausea, vomiting, diarrhea, or constipation  Genitourinary: Denies dysuria, hematuria, foamy urine, or incontinence; reports able to empty bladder  Musculoskeletal: Denies back or flank pain  Neurological: Denies headaches, dizziness, paresthesias, tremors or focal weakness      Vital Signs:  /80 (BP Location: Left arm, Patient Position: Sitting)   Pulse 86   Temp 97.4 °F (36.3 °C) (Temporal)   Resp 20   Ht 5' 6" " (1.676 m)   Wt 109.7 kg (241 lb 12.8 oz)   SpO2 96%   BMI 39.03 kg/m²   Body mass index is 39.03 kg/m².      Physical Exam:    General: no acute distress, awake, alert  Eyes: conjunctiva clear, eyelids without swelling  HENT: atraumatic, oropharynx and nasal mucosa patent  Neck: supple, trache midline, no JVD  Respiratory: equal, unlabored, clear to auscultation A/P  Cardiovascular: RRR without murmur or rub  Edema: none  Gastrointestinal: soft, non-tender, non-distended; positive bowel sounds; no masses to palpation; no ascites  Genitourinary: no CVA tenderness upon palpation  Musculoskeletal: ROM without new limitation or discomfort  Integumentary: warm, dry; no rashes, wounds, or skin lesions  Neurological: oriented x4, appropriate, no acute deficits; no asterixis      Labs:        Component Value Date/Time     10/09/2023 0854     07/28/2023 0752     09/20/2016 0805     06/16/2015 1016    K 4.2 10/09/2023 0854    K 3.5 07/28/2023 0752    K 4.3 09/20/2016 0805    K 3.9 06/16/2015 1016     09/20/2016 0805     06/16/2015 1016    CO2 28 10/09/2023 0854    CO2 28 07/28/2023 0752    CO2 25 09/20/2016 0805    CO2 24 06/16/2015 1016    BUN 4.6 (L) 10/09/2023 0854    BUN 10.7 07/28/2023 0752    BUN 7 09/20/2016 0805    BUN 6 06/16/2015 1016    CREATININE 0.82 10/09/2023 0854    CREATININE 0.86 07/28/2023 0752    CREATININE 0.89 07/06/2023 0814    CREATININE 0.87 06/12/2023 1543    CREATININE 1.0 09/20/2016 0805    CREATININE 0.9 06/16/2015 1016    CREATININE 0.9 11/28/2012 0805    CREATININE 0.7 07/22/2010 0805    CALCIUM 9.2 10/09/2023 0854    CALCIUM 9.5 07/28/2023 0752    CALCIUM 9.0 09/20/2016 0805    CALCIUM 9.2 06/16/2015 1016    PHOS 4.4 07/06/2023 0814    PHOS 2.7 03/29/2023 0918           Component Value Date/Time    WBC 5.20 10/09/2023 0854    WBC 7.63 07/28/2023 0752    WBC 4.50 09/20/2016 0805    WBC 4.54 06/16/2015 1016    HGB 13.2 10/09/2023 0854    HGB 12.6 07/28/2023  0752    HGB 12.6 09/20/2016 0805    HGB 13.0 06/16/2015 1016    HCT 41.8 10/09/2023 0854    HCT 40.1 07/28/2023 0752    HCT 39.9 09/20/2016 0805    HCT 39.2 06/16/2015 1016     10/09/2023 0854     07/28/2023 0752     09/20/2016 0805     06/16/2015 1016       Urine Creatinine   Date Value Ref Range Status   10/03/2023 154.1 (H) 45.0 - 106.0 mg/dL Final   07/06/2023 131.3 (H) 47.0 - 110.0 mg/dL Final       Urine Protein Level   Date Value Ref Range Status   10/03/2023 11.2 mg/dL Final   07/06/2023 135.1 mg/dL Final           Imaging:  Retroperitoneal US:      Impression:    1. Proteinuria, unspecified type        2. Membranous glomerulonephritis          Renal function stable and proteinuria is decreasing    Plan:    Continue ACE inhibitor and same dose of CellCept  Decrease prednisone to 10 mg every other day  Labs and follow-up visit in 2 months    Kat Gustafson      This note was created with the assistance of [employer] voice recognition software or phone dictation. There may be transcription errors as a result of using this technology however minimal. Effort has been made to assure accuracy of transcription but any obvious errors or omissions should be clarified with the author of the document.

## 2023-11-01 ENCOUNTER — OFFICE VISIT (OUTPATIENT)
Dept: FAMILY MEDICINE | Facility: CLINIC | Age: 48
End: 2023-11-01
Payer: COMMERCIAL

## 2023-11-01 ENCOUNTER — TELEPHONE (OUTPATIENT)
Dept: FAMILY MEDICINE | Facility: CLINIC | Age: 48
End: 2023-11-01

## 2023-11-01 ENCOUNTER — LAB VISIT (OUTPATIENT)
Dept: LAB | Facility: HOSPITAL | Age: 48
End: 2023-11-01
Attending: INTERNAL MEDICINE
Payer: COMMERCIAL

## 2023-11-01 VITALS
RESPIRATION RATE: 16 BRPM | TEMPERATURE: 98 F | HEIGHT: 66 IN | SYSTOLIC BLOOD PRESSURE: 120 MMHG | WEIGHT: 244.31 LBS | BODY MASS INDEX: 39.26 KG/M2 | DIASTOLIC BLOOD PRESSURE: 80 MMHG | OXYGEN SATURATION: 99 % | HEART RATE: 86 BPM

## 2023-11-01 DIAGNOSIS — R73.03 PREDIABETES: ICD-10-CM

## 2023-11-01 DIAGNOSIS — R73.01 ELEVATED FASTING GLUCOSE: ICD-10-CM

## 2023-11-01 DIAGNOSIS — Z23 FLU VACCINE NEED: ICD-10-CM

## 2023-11-01 DIAGNOSIS — M35.9 AUTOIMMUNE DISEASE: ICD-10-CM

## 2023-11-01 DIAGNOSIS — I10 HYPERTENSION, UNSPECIFIED TYPE: ICD-10-CM

## 2023-11-01 DIAGNOSIS — E66.01 SEVERE OBESITY (BMI 35.0-39.9) WITH COMORBIDITY: ICD-10-CM

## 2023-11-01 DIAGNOSIS — E66.01 SEVERE OBESITY (BMI 35.0-39.9) WITH COMORBIDITY: Primary | ICD-10-CM

## 2023-11-01 DIAGNOSIS — R73.01 ELEVATED FASTING GLUCOSE: Primary | ICD-10-CM

## 2023-11-01 DIAGNOSIS — N05.2 MEMBRANOUS GLOMERULONEPHRITIS: ICD-10-CM

## 2023-11-01 DIAGNOSIS — R80.8 OTHER PROTEINURIA: ICD-10-CM

## 2023-11-01 LAB
EST. AVERAGE GLUCOSE BLD GHB EST-MCNC: 128.4 MG/DL
HBA1C MFR BLD: 6.1 %

## 2023-11-01 PROCEDURE — 3074F SYST BP LT 130 MM HG: CPT | Mod: CPTII,,, | Performed by: INTERNAL MEDICINE

## 2023-11-01 PROCEDURE — 3066F NEPHROPATHY DOC TX: CPT | Mod: CPTII,,, | Performed by: INTERNAL MEDICINE

## 2023-11-01 PROCEDURE — 4010F ACE/ARB THERAPY RXD/TAKEN: CPT | Mod: CPTII,,, | Performed by: INTERNAL MEDICINE

## 2023-11-01 PROCEDURE — 3079F PR MOST RECENT DIASTOLIC BLOOD PRESSURE 80-89 MM HG: ICD-10-PCS | Mod: CPTII,,, | Performed by: INTERNAL MEDICINE

## 2023-11-01 PROCEDURE — 99214 PR OFFICE/OUTPT VISIT, EST, LEVL IV, 30-39 MIN: ICD-10-PCS | Mod: 25,,, | Performed by: INTERNAL MEDICINE

## 2023-11-01 PROCEDURE — 3066F PR DOCUMENTATION OF TREATMENT FOR NEPHROPATHY: ICD-10-PCS | Mod: CPTII,,, | Performed by: INTERNAL MEDICINE

## 2023-11-01 PROCEDURE — 99214 OFFICE O/P EST MOD 30 MIN: CPT | Mod: 25,,, | Performed by: INTERNAL MEDICINE

## 2023-11-01 PROCEDURE — 3044F HG A1C LEVEL LT 7.0%: CPT | Mod: CPTII,,, | Performed by: INTERNAL MEDICINE

## 2023-11-01 PROCEDURE — 1160F RVW MEDS BY RX/DR IN RCRD: CPT | Mod: CPTII,,, | Performed by: INTERNAL MEDICINE

## 2023-11-01 PROCEDURE — 90694 VACC AIIV4 NO PRSRV 0.5ML IM: CPT | Mod: ,,, | Performed by: INTERNAL MEDICINE

## 2023-11-01 PROCEDURE — 4010F PR ACE/ARB THEARPY RXD/TAKEN: ICD-10-PCS | Mod: CPTII,,, | Performed by: INTERNAL MEDICINE

## 2023-11-01 PROCEDURE — 3008F BODY MASS INDEX DOCD: CPT | Mod: CPTII,,, | Performed by: INTERNAL MEDICINE

## 2023-11-01 PROCEDURE — 1159F PR MEDICATION LIST DOCUMENTED IN MEDICAL RECORD: ICD-10-PCS | Mod: CPTII,,, | Performed by: INTERNAL MEDICINE

## 2023-11-01 PROCEDURE — 83036 HEMOGLOBIN GLYCOSYLATED A1C: CPT

## 2023-11-01 PROCEDURE — 90694 FLU VACCINE - QUADRIVALENT - ADJUVANTED: ICD-10-PCS | Mod: ,,, | Performed by: INTERNAL MEDICINE

## 2023-11-01 PROCEDURE — 3008F PR BODY MASS INDEX (BMI) DOCUMENTED: ICD-10-PCS | Mod: CPTII,,, | Performed by: INTERNAL MEDICINE

## 2023-11-01 PROCEDURE — 3074F PR MOST RECENT SYSTOLIC BLOOD PRESSURE < 130 MM HG: ICD-10-PCS | Mod: CPTII,,, | Performed by: INTERNAL MEDICINE

## 2023-11-01 PROCEDURE — 90471 IMMUNIZATION ADMIN: CPT | Mod: ,,, | Performed by: INTERNAL MEDICINE

## 2023-11-01 PROCEDURE — 3044F PR MOST RECENT HEMOGLOBIN A1C LEVEL <7.0%: ICD-10-PCS | Mod: CPTII,,, | Performed by: INTERNAL MEDICINE

## 2023-11-01 PROCEDURE — 36415 COLL VENOUS BLD VENIPUNCTURE: CPT

## 2023-11-01 PROCEDURE — 90471 FLU VACCINE - QUADRIVALENT - ADJUVANTED: ICD-10-PCS | Mod: ,,, | Performed by: INTERNAL MEDICINE

## 2023-11-01 PROCEDURE — 1160F PR REVIEW ALL MEDS BY PRESCRIBER/CLIN PHARMACIST DOCUMENTED: ICD-10-PCS | Mod: CPTII,,, | Performed by: INTERNAL MEDICINE

## 2023-11-01 PROCEDURE — 3079F DIAST BP 80-89 MM HG: CPT | Mod: CPTII,,, | Performed by: INTERNAL MEDICINE

## 2023-11-01 PROCEDURE — 1159F MED LIST DOCD IN RCRD: CPT | Mod: CPTII,,, | Performed by: INTERNAL MEDICINE

## 2023-11-01 RX ORDER — SEMAGLUTIDE 0.68 MG/ML
0.25 INJECTION, SOLUTION SUBCUTANEOUS
Qty: 3 ML | Refills: 5 | Status: SHIPPED | OUTPATIENT
Start: 2023-11-01 | End: 2023-12-01

## 2023-11-01 NOTE — TELEPHONE ENCOUNTER
Please call Dr. Donaldson GI office to ask them if they can provide add'l samples or sent Rx for IBSRELA for patient- she did see MD in May 2023 and was given samples with improvement in constipation; she does need treatment  thanks

## 2023-11-01 NOTE — PROGRESS NOTES
Subjective:      Patient ID: Maria E Lopez is a 48 y.o. female.    Chief Complaint: Follow-up    HPI    Patient moved with her 14 year old daughter to [place] from [place].  Prior to now, she was seeing endocrine MD and other's at [facility].  She has two older daughters employed living in [place]. She is . Empolyed at [employer].     6 month f/u visit  Last wellness 04/27/2023    Membranous nephropathy  -nephrology following, diagnosed 2023  -currently taking Cellcept and prednisone doing well no acute issues  -ACE inhibitor was added for proteinuria which has improved  HTN: compliant with Norvasc 5 mg daily  Hypothyroidism:  -every 3 years US for nodules  -stable with medication  -asx  Chronic Constipation:  -problem since she was 25 year old  -seen GI Dr. Elvis Donaldson [facility] 2023, samples of IBSRELA provided and she says this did help more but did not get Rx for this   -she did not tolerate amitiza, linzess did not work    HLD: on  rosuvastatin 20 mg po daily   Severe Obesity with Comorbidity, HLD: we have discussed patient weight, dietary habits and exercise- she is motivated to lose weight and change nutrition habit-she is working on dietary changes similar to [employer] system, she is bike riding for exercise  -we did try Rx for Mounjaro but not covered, she says ozempic is covered for her as an option      Surgery:  R knee partial knee replacement: 2021, Dr. Kingsley    Mammogram:   Abnormal 2023 screening mammogram  But once compared to images from  she did not need additional testing    PAP smear: 1/2022 normal, request record   *also referral to local GYN placed, Dr. Webb  Colon cancer screening: Dr. Elvis Donaldson 3/11/2022 In chart- no evidence of colon cancer- repeat in 10 yaers 3/11/2032  Flu: 11/1/2023  Prevnar 20: due next OV  COVID 19: completed 3 doses   Health Maintenance Due   Topic Date Due    Pneumococcal Vaccines (Age 0-64) (1 - PCV) Never done    Influenza  "Vaccine (1) 09/01/2023    COVID-19 Vaccine (4 - 2023-24 season) 09/01/2023     Review of Systems   Constitutional:  Negative for chills and fever.   HENT:  Negative for congestion, sinus pressure and sore throat.    Respiratory:  Positive for cough. Negative for shortness of breath.    Cardiovascular:  Negative for chest pain and palpitations.   Gastrointestinal:  Negative for abdominal pain and nausea.   Skin:  Negative for rash.       Objective:     Vitals:    11/01/23 0839   BP: 120/80   BP Location: Left arm   Patient Position: Sitting   BP Method: Large (Automatic)   Pulse: 86   Resp: 16   Temp: 98.3 °F (36.8 °C)   TempSrc: Temporal   SpO2: 99%   Weight: 110.8 kg (244 lb 4.8 oz)   Height: 5' 6" (1.676 m)     Physical Exam  Vitals and nursing note reviewed.   Constitutional:       General: She is not in acute distress.     Appearance: She is well-developed. She is not diaphoretic.   HENT:      Head: Normocephalic and atraumatic.      Right Ear: Tympanic membrane normal.      Left Ear: Tympanic membrane normal.      Nose: Nose normal.      Mouth/Throat:      Pharynx: No oropharyngeal exudate.   Eyes:      General:         Right eye: No discharge.         Left eye: No discharge.      Conjunctiva/sclera: Conjunctivae normal.      Pupils: Pupils are equal, round, and reactive to light.   Neck:      Thyroid: No thyromegaly.   Cardiovascular:      Rate and Rhythm: Normal rate and regular rhythm.      Heart sounds: Normal heart sounds. No murmur heard.  Pulmonary:      Effort: Pulmonary effort is normal. No respiratory distress.      Breath sounds: Normal breath sounds. No wheezing or rales.   Musculoskeletal:      Cervical back: Normal range of motion and neck supple.   Lymphadenopathy:      Cervical: No cervical adenopathy.   Skin:     General: Skin is warm and dry.   Neurological:      Mental Status: She is alert and oriented to person, place, and time.   Psychiatric:         Mood and Affect: Mood normal.         " Behavior: Behavior normal.       Assessment/Plan:     1. Elevated fasting glucose  -     Hemoglobin A1C; Future; Expected date: 11/01/2023  Repeat A1c now given that patient is on steroids and fasting sugar increasing  Will work on GLP 1 medication for patient whether this is Ozempic versus Mounjaro for glycemic control and weight loss  2. Autoimmune disease  Stable continue cellcept and prednisone  Flu shot provided in office  Next OV will work on Prevnar 20  3. Membranous glomerulonephritis  Stable  Continue cellcept and prednisone  F/u with nephrology  4. Hypertension, unspecified type  Stable ccm  5. Other proteinuria  Patient on lisinopril now with dry cough  Advised her that this is likely side effect from lisinopril that she should notify her nephrologist to change medication  6. Severe obesity (BMI 35.0-39.9) with comorbidity  BMI reviewed  Weight loss advised  Diet/exercise, will work on medication GLP for her  7. Prediabetes  See 6     Follow up in about 6 months (around 5/1/2024) for Annual Wellness.    This includes face to face time and non-face to face time preparing to see the patient (eg, review of tests), obtaining and/or reviewing separately obtained history, documenting clinical information in the electronic or other health record, independently interpreting results and communicating results to the patient/family/caregiver, or care coordinator.

## 2023-11-07 ENCOUNTER — TELEPHONE (OUTPATIENT)
Dept: NEPHROLOGY | Facility: CLINIC | Age: 48
End: 2023-11-07
Payer: COMMERCIAL

## 2023-11-07 ENCOUNTER — PATIENT MESSAGE (OUTPATIENT)
Dept: NEPHROLOGY | Facility: CLINIC | Age: 48
End: 2023-11-07
Payer: COMMERCIAL

## 2023-11-07 DIAGNOSIS — I10 HYPERTENSION, UNSPECIFIED TYPE: Primary | ICD-10-CM

## 2023-11-07 RX ORDER — LOSARTAN POTASSIUM 50 MG/1
50 TABLET ORAL DAILY
Qty: 30 TABLET | Refills: 11 | Status: SHIPPED | OUTPATIENT
Start: 2023-11-07 | End: 2024-11-06

## 2023-11-07 NOTE — TELEPHONE ENCOUNTER
Called patient regarding blood pressure medications and cough, Dr Gustafson ordered to stop Lisinopril and take Losartan 50 mg daily, sent to pharmacy on file. Verbalized understanding.

## 2023-11-13 ENCOUNTER — PATIENT MESSAGE (OUTPATIENT)
Dept: FAMILY MEDICINE | Facility: CLINIC | Age: 48
End: 2023-11-13
Payer: COMMERCIAL

## 2023-11-16 ENCOUNTER — TELEPHONE (OUTPATIENT)
Dept: FAMILY MEDICINE | Facility: CLINIC | Age: 48
End: 2023-11-16
Payer: COMMERCIAL

## 2023-11-16 NOTE — TELEPHONE ENCOUNTER
----- Message from Cara Carver sent at 11/16/2023  3:09 PM CST -----  Regarding: Pharmacy Call  .Type:  Pharmacy Calling to Clarify an RX    Name of Caller:Jihan  Pharmacy Name:[facility]  Prescription Name:Ozempic   What do they need to clarify?:more information for PA  Best Call Back Number:[phone]   case #  [id number]  Additional Information:

## 2023-12-01 ENCOUNTER — PATIENT MESSAGE (OUTPATIENT)
Dept: NEPHROLOGY | Facility: CLINIC | Age: 48
End: 2023-12-01
Payer: COMMERCIAL

## 2023-12-12 ENCOUNTER — LAB VISIT (OUTPATIENT)
Dept: LAB | Facility: HOSPITAL | Age: 48
End: 2023-12-12
Attending: INTERNAL MEDICINE
Payer: COMMERCIAL

## 2023-12-12 DIAGNOSIS — R80.9 PROTEINURIA, UNSPECIFIED TYPE: ICD-10-CM

## 2023-12-12 DIAGNOSIS — N05.2 MEMBRANOUS GLOMERULONEPHRITIS: ICD-10-CM

## 2023-12-12 LAB
ALBUMIN SERPL-MCNC: 3.9 G/DL (ref 3.5–5)
ALBUMIN/GLOB SERPL: 1.1 RATIO (ref 1.1–2)
ALP SERPL-CCNC: 54 UNIT/L (ref 40–150)
ALT SERPL-CCNC: 23 UNIT/L (ref 0–55)
APPEARANCE UR: CLEAR
AST SERPL-CCNC: 17 UNIT/L (ref 5–34)
BACTERIA #/AREA URNS AUTO: ABNORMAL /HPF
BASOPHILS # BLD AUTO: 0.05 X10(3)/MCL
BASOPHILS NFR BLD AUTO: 0.8 %
BILIRUB SERPL-MCNC: 1 MG/DL
BILIRUB UR QL STRIP.AUTO: NEGATIVE
BUN SERPL-MCNC: 8.5 MG/DL (ref 7–18.7)
CALCIUM SERPL-MCNC: 9.5 MG/DL (ref 8.4–10.2)
CHLORIDE SERPL-SCNC: 108 MMOL/L (ref 98–107)
CO2 SERPL-SCNC: 25 MMOL/L (ref 22–29)
COLOR UR AUTO: ABNORMAL
CREAT SERPL-MCNC: 0.83 MG/DL (ref 0.55–1.02)
CREAT UR-MCNC: 165 MG/DL (ref 45–106)
EOSINOPHIL # BLD AUTO: 0.15 X10(3)/MCL (ref 0–0.9)
EOSINOPHIL NFR BLD AUTO: 2.4 %
ERYTHROCYTE [DISTWIDTH] IN BLOOD BY AUTOMATED COUNT: 13.2 % (ref 11.5–17)
GFR SERPLBLD CREATININE-BSD FMLA CKD-EPI: >60 MLS/MIN/1.73/M2
GLOBULIN SER-MCNC: 3.4 GM/DL (ref 2.4–3.5)
GLUCOSE SERPL-MCNC: 101 MG/DL (ref 74–100)
GLUCOSE UR QL STRIP.AUTO: NEGATIVE
HCT VFR BLD AUTO: 39.6 % (ref 37–47)
HGB BLD-MCNC: 12.7 G/DL (ref 12–16)
IMM GRANULOCYTES # BLD AUTO: 0.03 X10(3)/MCL (ref 0–0.04)
IMM GRANULOCYTES NFR BLD AUTO: 0.5 %
KETONES UR QL STRIP.AUTO: NEGATIVE
LEUKOCYTE ESTERASE UR QL STRIP.AUTO: ABNORMAL
LYMPHOCYTES # BLD AUTO: 2.93 X10(3)/MCL (ref 0.6–4.6)
LYMPHOCYTES NFR BLD AUTO: 46.4 %
MCH RBC QN AUTO: 28.2 PG (ref 27–31)
MCHC RBC AUTO-ENTMCNC: 32.1 G/DL (ref 33–36)
MCV RBC AUTO: 87.8 FL (ref 80–94)
MONOCYTES # BLD AUTO: 0.46 X10(3)/MCL (ref 0.1–1.3)
MONOCYTES NFR BLD AUTO: 7.3 %
NEUTROPHILS # BLD AUTO: 2.69 X10(3)/MCL (ref 2.1–9.2)
NEUTROPHILS NFR BLD AUTO: 42.6 %
NITRITE UR QL STRIP.AUTO: NEGATIVE
NRBC BLD AUTO-RTO: 0 %
PH UR STRIP.AUTO: 6 [PH]
PHOSPHATE SERPL-MCNC: 3.7 MG/DL (ref 2.3–4.7)
PLATELET # BLD AUTO: 391 X10(3)/MCL (ref 130–400)
PMV BLD AUTO: 9.9 FL (ref 7.4–10.4)
POTASSIUM SERPL-SCNC: 3.9 MMOL/L (ref 3.5–5.1)
PROT SERPL-MCNC: 7.3 GM/DL (ref 6.4–8.3)
PROT UR QL STRIP.AUTO: NEGATIVE
PROT UR STRIP-MCNC: 16.5 MG/DL
RBC # BLD AUTO: 4.51 X10(6)/MCL (ref 4.2–5.4)
RBC #/AREA URNS AUTO: ABNORMAL /HPF
RBC UR QL AUTO: ABNORMAL
SODIUM SERPL-SCNC: 141 MMOL/L (ref 136–145)
SP GR UR STRIP.AUTO: 1.02 (ref 1–1.03)
SQUAMOUS #/AREA URNS AUTO: ABNORMAL /HPF
T VAGINALIS URNS QL MICRO: ABNORMAL /HPF
URINE PROTEIN/CREATININE RATIO (OHS): 0.1
UROBILINOGEN UR STRIP-ACNC: 0.2
WBC # SPEC AUTO: 6.31 X10(3)/MCL (ref 4.5–11.5)
WBC #/AREA URNS AUTO: ABNORMAL /HPF

## 2023-12-12 PROCEDURE — 87086 URINE CULTURE/COLONY COUNT: CPT

## 2023-12-12 PROCEDURE — 81001 URINALYSIS AUTO W/SCOPE: CPT

## 2023-12-12 PROCEDURE — 85025 COMPLETE CBC W/AUTO DIFF WBC: CPT

## 2023-12-12 PROCEDURE — 84100 ASSAY OF PHOSPHORUS: CPT

## 2023-12-12 PROCEDURE — 82570 ASSAY OF URINE CREATININE: CPT

## 2023-12-12 PROCEDURE — 80053 COMPREHEN METABOLIC PANEL: CPT

## 2023-12-12 PROCEDURE — 36415 COLL VENOUS BLD VENIPUNCTURE: CPT

## 2023-12-14 ENCOUNTER — OFFICE VISIT (OUTPATIENT)
Dept: NEPHROLOGY | Facility: CLINIC | Age: 48
End: 2023-12-14
Payer: COMMERCIAL

## 2023-12-14 VITALS
DIASTOLIC BLOOD PRESSURE: 90 MMHG | SYSTOLIC BLOOD PRESSURE: 130 MMHG | OXYGEN SATURATION: 95 % | HEIGHT: 66 IN | TEMPERATURE: 98 F | HEART RATE: 85 BPM | BODY MASS INDEX: 38.57 KG/M2 | RESPIRATION RATE: 20 BRPM | WEIGHT: 240 LBS

## 2023-12-14 DIAGNOSIS — R80.9 PROTEINURIA, UNSPECIFIED TYPE: Primary | ICD-10-CM

## 2023-12-14 DIAGNOSIS — N05.2 MEMBRANOUS GLOMERULONEPHRITIS: ICD-10-CM

## 2023-12-14 LAB — BACTERIA UR CULT: NORMAL

## 2023-12-14 PROCEDURE — 3044F HG A1C LEVEL LT 7.0%: CPT | Mod: CPTII,S$GLB,, | Performed by: INTERNAL MEDICINE

## 2023-12-14 PROCEDURE — 3066F NEPHROPATHY DOC TX: CPT | Mod: CPTII,S$GLB,, | Performed by: INTERNAL MEDICINE

## 2023-12-14 PROCEDURE — 3066F PR DOCUMENTATION OF TREATMENT FOR NEPHROPATHY: ICD-10-PCS | Mod: CPTII,S$GLB,, | Performed by: INTERNAL MEDICINE

## 2023-12-14 PROCEDURE — 4010F PR ACE/ARB THEARPY RXD/TAKEN: ICD-10-PCS | Mod: CPTII,S$GLB,, | Performed by: INTERNAL MEDICINE

## 2023-12-14 PROCEDURE — 4010F ACE/ARB THERAPY RXD/TAKEN: CPT | Mod: CPTII,S$GLB,, | Performed by: INTERNAL MEDICINE

## 2023-12-14 PROCEDURE — 99213 PR OFFICE/OUTPT VISIT, EST, LEVL III, 20-29 MIN: ICD-10-PCS | Mod: S$GLB,,, | Performed by: INTERNAL MEDICINE

## 2023-12-14 PROCEDURE — 1159F MED LIST DOCD IN RCRD: CPT | Mod: CPTII,S$GLB,, | Performed by: INTERNAL MEDICINE

## 2023-12-14 PROCEDURE — 3075F PR MOST RECENT SYSTOLIC BLOOD PRESS GE 130-139MM HG: ICD-10-PCS | Mod: CPTII,S$GLB,, | Performed by: INTERNAL MEDICINE

## 2023-12-14 PROCEDURE — 3080F DIAST BP >= 90 MM HG: CPT | Mod: CPTII,S$GLB,, | Performed by: INTERNAL MEDICINE

## 2023-12-14 PROCEDURE — 3080F PR MOST RECENT DIASTOLIC BLOOD PRESSURE >= 90 MM HG: ICD-10-PCS | Mod: CPTII,S$GLB,, | Performed by: INTERNAL MEDICINE

## 2023-12-14 PROCEDURE — 3075F SYST BP GE 130 - 139MM HG: CPT | Mod: CPTII,S$GLB,, | Performed by: INTERNAL MEDICINE

## 2023-12-14 PROCEDURE — 99999 PR PBB SHADOW E&M-EST. PATIENT-LVL IV: CPT | Mod: PBBFAC,,, | Performed by: INTERNAL MEDICINE

## 2023-12-14 PROCEDURE — 1159F PR MEDICATION LIST DOCUMENTED IN MEDICAL RECORD: ICD-10-PCS | Mod: CPTII,S$GLB,, | Performed by: INTERNAL MEDICINE

## 2023-12-14 PROCEDURE — 3008F PR BODY MASS INDEX (BMI) DOCUMENTED: ICD-10-PCS | Mod: CPTII,S$GLB,, | Performed by: INTERNAL MEDICINE

## 2023-12-14 PROCEDURE — 3044F PR MOST RECENT HEMOGLOBIN A1C LEVEL <7.0%: ICD-10-PCS | Mod: CPTII,S$GLB,, | Performed by: INTERNAL MEDICINE

## 2023-12-14 PROCEDURE — 99999 PR PBB SHADOW E&M-EST. PATIENT-LVL IV: ICD-10-PCS | Mod: PBBFAC,,, | Performed by: INTERNAL MEDICINE

## 2023-12-14 PROCEDURE — 3008F BODY MASS INDEX DOCD: CPT | Mod: CPTII,S$GLB,, | Performed by: INTERNAL MEDICINE

## 2023-12-14 PROCEDURE — 99213 OFFICE O/P EST LOW 20 MIN: CPT | Mod: S$GLB,,, | Performed by: INTERNAL MEDICINE

## 2023-12-14 RX ORDER — TENAPANOR HYDROCHLORIDE 53.2 MG/1
50 TABLET ORAL 2 TIMES DAILY
COMMUNITY
Start: 2023-11-02

## 2023-12-14 NOTE — PROGRESS NOTES
[facility] Progress Note      HPI  Maria E Lopez, 48 y.o. female, presents to office for a follow up visit for membranous nephropathy.  Since she has been on CellCept and steroids her proteinuria decreased from around 3 g to 100 mg.  Currently we are still working on weaning the steroids very slowly and she is tolerating it well.  She is still on the same dose on CellCept repeat labs show significant stabilization of the proteinuria and her kidney function  She admits that her blood pressure is better at home  Patient denies taking NSAIDs or new antibiotics. Also denies recent episode of dehydration, diarrhea, vomiting, acute illness, hospitalization, recent angiograms or exposure to IV radiocontrast.        Medical Diagnoses:   Past Medical History:   Diagnosis Date    Chronic constipation     Hashimoto's thyroiditis     Hyperlipidemia     Hypertension     Hypothyroidism     Obesity     Osteoarthritis     Prediabetes      Patient Active Problem List   Diagnosis    Acquired hypothyroidism    Chronic constipation    Severe obesity (BMI 35.0-39.9) with comorbidity    Prediabetes    Microscopic hematuria    Osteoarthritis of right knee    Hypertension    Hyperlipidemia    Other proteinuria    Drug-induced immunodeficiency    Autoimmune disease    Membranous glomerulonephritis       Surgical History:   Past Surgical History:   Procedure Laterality Date    ARTHROPLASTY  10/26/2021    BIOPSY OF LESION      JOINT REPLACEMENT  Right Knee    2021    replacement of rt knee joint Right 10/26/2021    rt knee arthroscopy         Family History:   Family History   Problem Relation Age of Onset    Thyroid disease Mother     Diabetes Mother     Hypertension Mother     Arthritis Mother     Liver cancer Father     Heart disease Father     COPD Father     Alcohol abuse Father     Heart disease Paternal Grandfather     Cancer Paternal Grandmother     Stroke Maternal Grandfather        Social History:   Social  History     Tobacco Use    Smoking status: Never     Passive exposure: Never    Smokeless tobacco: Never   Substance Use Topics    Alcohol use: Never       Allergies:  Review of patient's allergies indicates:  No Known Allergies    Medications:    Current Outpatient Medications:     IBSRELA 50 mg Tab, Take 50 tablets by mouth 2 (two) times a day., Disp: , Rfl:     levonorgestreL (LILETTA) 20.1 mcg/24 hrs (6 yrs) 52 mg IUD, 52 mg by Intrauterine route., Disp: , Rfl:     levothyroxine (SYNTHROID) 100 MCG tablet, Take 1 tablet (100 mcg total) by mouth once daily., Disp: 90 tablet, Rfl: 3    liothyronine (CYTOMEL) 5 MCG Tab, Take 1 tablet (5 mcg total) by mouth once daily., Disp: 90 tablet, Rfl: 3    losartan (COZAAR) 50 MG tablet, Take 1 tablet (50 mg total) by mouth once daily., Disp: 30 tablet, Rfl: 11    mycophenolate (CELLCEPT) 500 mg Tab, Take 500 mg by mouth 2 (two) times daily., Disp: , Rfl:     omeprazole (PRILOSEC) 20 MG capsule, Take by mouth Daily., Disp: , Rfl:     predniSONE (DELTASONE) 20 MG tablet, Take 0.5 tablets (10 mg total) by mouth every other day., Disp: 90 tablet, Rfl: 3    rosuvastatin (CRESTOR) 20 MG tablet, Take 1 tablet (20 mg total) by mouth once daily., Disp: 90 tablet, Rfl: 1       Review of Systems:    Constitutional: Denies fever, fatigue, generalized weakness  Skin: Denies wounds, no rashes, no itching, no new skin lesions  Respiratory:  Denies cough, shortness of breath, or wheezing  Cardiovascular: Denies chest pain, palpitations, or swelling  Gastrointestional: Denies abdominal pain, nausea, vomiting, diarrhea, or constipation  Genitourinary: Denies dysuria, hematuria, foamy urine, or incontinence; reports able to empty bladder  Musculoskeletal: Denies back or flank pain  Neurological: Denies headaches, dizziness, paresthesias, tremors or focal weakness      Vital Signs:  BP (!) 130/90 (BP Location: Left arm, Patient Position: Sitting)   Pulse 85   Temp 97.5 °F (36.4 °C) (Temporal)  "  Resp 20   Ht 5' 6" (1.676 m)   Wt 108.9 kg (240 lb)   SpO2 95%   BMI 38.74 kg/m²   Body mass index is 38.74 kg/m².      Physical Exam:    General: no acute distress, awake, alert  Eyes: conjunctiva clear, eyelids without swelling  HENT: atraumatic, oropharynx and nasal mucosa patent  Neck: supple, trache midline, no JVD  Respiratory: equal, unlabored, clear to auscultation A/P  Cardiovascular: RRR without murmur or rub  Edema: none  Gastrointestinal: soft, non-tender, non-distended; positive bowel sounds; no masses to palpation; no ascites  Genitourinary: no CVA tenderness upon palpation  Musculoskeletal: ROM without new limitation or discomfort  Integumentary: warm, dry; no rashes, wounds, or skin lesions  Neurological: oriented x4, appropriate, no acute deficits; no asterixis      Labs:        Component Value Date/Time     12/12/2023 1440     10/09/2023 0854     09/20/2016 0805     06/16/2015 1016    K 3.9 12/12/2023 1440    K 4.2 10/09/2023 0854    K 4.3 09/20/2016 0805    K 3.9 06/16/2015 1016     09/20/2016 0805     06/16/2015 1016    CO2 25 12/12/2023 1440    CO2 28 10/09/2023 0854    CO2 25 09/20/2016 0805    CO2 24 06/16/2015 1016    BUN 8.5 12/12/2023 1440    BUN 4.6 (L) 10/09/2023 0854    BUN 7 09/20/2016 0805    BUN 6 06/16/2015 1016    CREATININE 0.83 12/12/2023 1440    CREATININE 0.82 10/09/2023 0854    CREATININE 0.86 07/28/2023 0752    CREATININE 0.89 07/06/2023 0814    CREATININE 1.0 09/20/2016 0805    CREATININE 0.9 06/16/2015 1016    CREATININE 0.9 11/28/2012 0805    CREATININE 0.7 07/22/2010 0805    CALCIUM 9.5 12/12/2023 1440    CALCIUM 9.2 10/09/2023 0854    CALCIUM 9.0 09/20/2016 0805    CALCIUM 9.2 06/16/2015 1016    PHOS 3.7 12/12/2023 1440    PHOS 4.4 07/06/2023 0814           Component Value Date/Time    WBC 6.31 12/12/2023 1440    WBC 5.20 10/09/2023 0854    WBC 4.50 09/20/2016 0805    WBC 4.54 06/16/2015 1016    HGB 12.7 12/12/2023 1440    HGB " 13.2 10/09/2023 0854    HGB 12.6 09/20/2016 0805    HGB 13.0 06/16/2015 1016    HCT 39.6 12/12/2023 1440    HCT 41.8 10/09/2023 0854    HCT 39.9 09/20/2016 0805    HCT 39.2 06/16/2015 1016     12/12/2023 1440     10/09/2023 0854     09/20/2016 0805     06/16/2015 1016       Urine Creatinine   Date Value Ref Range Status   12/12/2023 165.0 (H) 45.0 - 106.0 mg/dL Final   10/03/2023 154.1 (H) 45.0 - 106.0 mg/dL Final       Urine Protein Level   Date Value Ref Range Status   12/12/2023 16.5 mg/dL Final   10/03/2023 11.2 mg/dL Final           Imaging:  Retroperitoneal US:      Impression:/PLAN    1. Proteinuria, unspecified type        2. Membranous glomerulonephritis        Membranous nephropathy with a good response to combination of CellCept and steroids  Working successfully on weaning down the prednisone currently we will decrease the dose to 10 mg every 3rd day for 2 weeks then 5 mg every 3rd day for 3 weeks.  I will follow back with labs and repeat measurement of proteinuria and follow-up visit in 6 weeks      Kat Gustafson      This note was created with the assistance of [employer] voice recognition software or phone dictation. There may be transcription errors as a result of using this technology however minimal. Effort has been made to assure accuracy of transcription but any obvious errors or omissions should be clarified with the author of the document.

## 2024-01-24 ENCOUNTER — LAB VISIT (OUTPATIENT)
Dept: LAB | Facility: HOSPITAL | Age: 49
End: 2024-01-24
Attending: INTERNAL MEDICINE
Payer: COMMERCIAL

## 2024-01-24 DIAGNOSIS — N05.2 MEMBRANOUS GLOMERULONEPHRITIS: ICD-10-CM

## 2024-01-24 DIAGNOSIS — R80.9 PROTEINURIA, UNSPECIFIED TYPE: ICD-10-CM

## 2024-01-24 DIAGNOSIS — I10 HYPERTENSION, UNSPECIFIED TYPE: ICD-10-CM

## 2024-01-24 LAB
ALBUMIN SERPL-MCNC: 4.1 G/DL (ref 3.5–5)
ALBUMIN/GLOB SERPL: 1.5 RATIO (ref 1.1–2)
ALP SERPL-CCNC: 37 UNIT/L (ref 40–150)
ALT SERPL-CCNC: 18 UNIT/L (ref 0–55)
APPEARANCE UR: CLEAR
AST SERPL-CCNC: 24 UNIT/L (ref 5–34)
BACTERIA #/AREA URNS AUTO: ABNORMAL /HPF
BILIRUB SERPL-MCNC: 1.2 MG/DL
BILIRUB UR QL STRIP.AUTO: NEGATIVE
BUN SERPL-MCNC: 8.6 MG/DL (ref 7–18.7)
CALCIUM SERPL-MCNC: 9.5 MG/DL (ref 8.4–10.2)
CHLORIDE SERPL-SCNC: 110 MMOL/L (ref 98–107)
CO2 SERPL-SCNC: 23 MMOL/L (ref 22–29)
COLOR UR AUTO: ABNORMAL
CREAT SERPL-MCNC: 0.82 MG/DL (ref 0.55–1.02)
CREAT UR-MCNC: 89.3 MG/DL (ref 45–106)
GFR SERPLBLD CREATININE-BSD FMLA CKD-EPI: >60 MLS/MIN/1.73/M2
GLOBULIN SER-MCNC: 2.7 GM/DL (ref 2.4–3.5)
GLUCOSE SERPL-MCNC: 103 MG/DL (ref 74–100)
GLUCOSE UR QL STRIP.AUTO: NEGATIVE
KETONES UR QL STRIP.AUTO: 15
LEUKOCYTE ESTERASE UR QL STRIP.AUTO: ABNORMAL
NITRITE UR QL STRIP.AUTO: NEGATIVE
PH UR STRIP.AUTO: 5.5 [PH]
POTASSIUM SERPL-SCNC: 3.9 MMOL/L (ref 3.5–5.1)
PROT SERPL-MCNC: 6.8 GM/DL (ref 6.4–8.3)
PROT UR QL STRIP.AUTO: NEGATIVE
PROT UR STRIP-MCNC: <6.8 MG/DL
RBC #/AREA URNS AUTO: ABNORMAL /HPF
RBC UR QL AUTO: ABNORMAL
SODIUM SERPL-SCNC: 142 MMOL/L (ref 136–145)
SP GR UR STRIP.AUTO: 1.01 (ref 1–1.03)
SQUAMOUS #/AREA URNS AUTO: ABNORMAL /HPF
T VAGINALIS URNS QL MICRO: ABNORMAL /HPF
UROBILINOGEN UR STRIP-ACNC: 0.2
WBC #/AREA URNS AUTO: ABNORMAL /HPF

## 2024-01-24 PROCEDURE — 82570 ASSAY OF URINE CREATININE: CPT

## 2024-01-24 PROCEDURE — 83520 IMMUNOASSAY QUANT NOS NONAB: CPT

## 2024-01-24 PROCEDURE — 80053 COMPREHEN METABOLIC PANEL: CPT

## 2024-01-24 PROCEDURE — 86255 FLUORESCENT ANTIBODY SCREEN: CPT

## 2024-01-24 PROCEDURE — 81001 URINALYSIS AUTO W/SCOPE: CPT

## 2024-01-24 PROCEDURE — 36415 COLL VENOUS BLD VENIPUNCTURE: CPT

## 2024-01-25 ENCOUNTER — OFFICE VISIT (OUTPATIENT)
Dept: NEPHROLOGY | Facility: CLINIC | Age: 49
End: 2024-01-25
Payer: COMMERCIAL

## 2024-01-25 VITALS
SYSTOLIC BLOOD PRESSURE: 126 MMHG | HEIGHT: 66 IN | TEMPERATURE: 98 F | WEIGHT: 231.81 LBS | OXYGEN SATURATION: 96 % | RESPIRATION RATE: 20 BRPM | BODY MASS INDEX: 37.26 KG/M2 | DIASTOLIC BLOOD PRESSURE: 84 MMHG | HEART RATE: 83 BPM

## 2024-01-25 DIAGNOSIS — N02.2 MEMBRANOUS NEPHROPATHY DETERMINED BY BIOPSY: Primary | ICD-10-CM

## 2024-01-25 DIAGNOSIS — R31.29 MICROSCOPIC HEMATURIA: ICD-10-CM

## 2024-01-25 DIAGNOSIS — I10 HYPERTENSION, UNSPECIFIED TYPE: ICD-10-CM

## 2024-01-25 DIAGNOSIS — R80.1 PERSISTENT PROTEINURIA: ICD-10-CM

## 2024-01-25 PROCEDURE — 3008F BODY MASS INDEX DOCD: CPT | Mod: CPTII,S$GLB,, | Performed by: NURSE PRACTITIONER

## 2024-01-25 PROCEDURE — 99999 PR PBB SHADOW E&M-EST. PATIENT-LVL IV: CPT | Mod: PBBFAC,,, | Performed by: NURSE PRACTITIONER

## 2024-01-25 PROCEDURE — 3066F NEPHROPATHY DOC TX: CPT | Mod: CPTII,S$GLB,, | Performed by: NURSE PRACTITIONER

## 2024-01-25 PROCEDURE — 1159F MED LIST DOCD IN RCRD: CPT | Mod: CPTII,S$GLB,, | Performed by: NURSE PRACTITIONER

## 2024-01-25 PROCEDURE — 99213 OFFICE O/P EST LOW 20 MIN: CPT | Mod: S$GLB,,, | Performed by: NURSE PRACTITIONER

## 2024-01-25 PROCEDURE — 3074F SYST BP LT 130 MM HG: CPT | Mod: CPTII,S$GLB,, | Performed by: NURSE PRACTITIONER

## 2024-01-25 PROCEDURE — 3079F DIAST BP 80-89 MM HG: CPT | Mod: CPTII,S$GLB,, | Performed by: NURSE PRACTITIONER

## 2024-01-25 PROCEDURE — 4010F ACE/ARB THERAPY RXD/TAKEN: CPT | Mod: CPTII,S$GLB,, | Performed by: NURSE PRACTITIONER

## 2024-01-25 RX ORDER — FLUCONAZOLE 150 MG/1
150 TABLET ORAL ONCE
COMMUNITY
Start: 2024-01-25 | End: 2024-03-12

## 2024-01-25 NOTE — PROGRESS NOTES
[facility] Progress [facility]      HPI  Maria E Lopez, 48 y.o. female, presents to office for a follow up visit for membranous nephropathy.  Since she has been on CellCept and steroids her proteinuria decreased from around 3 g to 100 mg.  She continues to wean her steroids.  Kidney function has been stable.  Proteinuria improving.    Patient denies taking NSAIDs or new antibiotics. Also denies recent episode of dehydration, diarrhea, vomiting, acute illness, hospitalization, recent angiograms or exposure to IV radiocontrast.        Medical Diagnoses:   Past Medical History:   Diagnosis Date    Chronic constipation     Hashimoto's thyroiditis     Hyperlipidemia     Hypertension     Hypothyroidism     Obesity     Osteoarthritis     Prediabetes      Patient Active Problem List   Diagnosis    Acquired hypothyroidism    Chronic constipation    Severe obesity (BMI 35.0-39.9) with comorbidity    Prediabetes    Microscopic hematuria    Osteoarthritis of right knee    Hypertension    Hyperlipidemia    Other proteinuria    Drug-induced immunodeficiency    Autoimmune disease    Membranous glomerulonephritis       Surgical History:   Past Surgical History:   Procedure Laterality Date    ARTHROPLASTY  10/26/2021    BIOPSY OF LESION      JOINT REPLACEMENT  Right Knee    2021    replacement of rt knee joint Right 10/26/2021    rt knee arthroscopy         Family History:   Family History   Problem Relation Age of Onset    Thyroid disease Mother     Diabetes Mother     Hypertension Mother     Arthritis Mother     Liver cancer Father     Heart disease Father     COPD Father     Alcohol abuse Father     Heart disease Paternal Grandfather     Cancer Paternal Grandmother     Stroke Maternal Grandfather        Social History:   Social History     Tobacco Use    Smoking status: Never     Passive exposure: Never    Smokeless tobacco: Never   Substance Use Topics    Alcohol use: Never       Allergies:  Review of  "patient's allergies indicates:  No Known Allergies    Medications:    Current Outpatient Medications:     fluconazole (DIFLUCAN) 150 MG Tab, Take 150 mg by mouth once., Disp: , Rfl:     IBSRELA 50 mg Tab, Take 50 tablets by mouth 2 (two) times a day., Disp: , Rfl:     levonorgestreL (LILETTA) 20.1 mcg/24 hrs (6 yrs) 52 mg IUD, 52 mg by Intrauterine route., Disp: , Rfl:     levothyroxine (SYNTHROID) 100 MCG tablet, Take 1 tablet (100 mcg total) by mouth once daily., Disp: 90 tablet, Rfl: 3    liothyronine (CYTOMEL) 5 MCG Tab, Take 1 tablet (5 mcg total) by mouth once daily., Disp: 90 tablet, Rfl: 3    losartan (COZAAR) 50 MG tablet, Take 1 tablet (50 mg total) by mouth once daily., Disp: 30 tablet, Rfl: 11    mycophenolate (CELLCEPT) 500 mg Tab, Take 500 mg by mouth 2 (two) times daily., Disp: , Rfl:     omeprazole (PRILOSEC) 20 MG capsule, Take by mouth Daily., Disp: , Rfl:     predniSONE (DELTASONE) 20 MG tablet, Take 0.5 tablets (10 mg total) by mouth every other day., Disp: 90 tablet, Rfl: 3    rosuvastatin (CRESTOR) 20 MG tablet, Take 1 tablet (20 mg total) by mouth once daily., Disp: 90 tablet, Rfl: 1       Review of Systems:    Constitutional: Denies fever, fatigue, generalized weakness  Skin: Denies wounds, no rashes, no itching, no new skin lesions  Respiratory:  Denies cough, shortness of breath, or wheezing  Cardiovascular: Denies chest pain, palpitations, or swelling  Gastrointestional: Denies abdominal pain, nausea, vomiting, diarrhea, or constipation  Genitourinary: Denies dysuria, hematuria, foamy urine, or incontinence; reports able to empty bladder  Musculoskeletal: Denies back or flank pain  Neurological: Denies headaches, dizziness, paresthesias, tremors or focal weakness      Vital Signs:  /84 (BP Location: Left arm, Patient Position: Sitting, BP Method: Medium (Automatic))   Pulse 83   Temp 98.1 °F (36.7 °C) (Oral)   Resp 20   Ht 5' 5.98" (1.676 m)   Wt 105.1 kg (231 lb 12.8 oz)   " SpO2 96%   BMI 37.43 kg/m²   Body mass index is 37.43 kg/m².      Physical Exam:    General: no acute distress, awake, alert  Eyes: conjunctiva clear, eyelids without swelling  HENT: atraumatic, oropharynx and nasal mucosa patent  Neck: supple, trache midline, no JVD  Respiratory: equal, unlabored, clear to auscultation A/P  Cardiovascular: RRR without murmur or rub  Edema: none  Gastrointestinal: soft, non-tender, non-distended; positive bowel sounds; no masses to palpation; no ascites  Genitourinary: no CVA tenderness upon palpation  Musculoskeletal: ROM without new limitation or discomfort  Integumentary: warm, dry; no rashes, wounds, or skin lesions  Neurological: oriented x4, appropriate, no acute deficits; no asterixis      Labs:        Component Value Date/Time     01/24/2024 1014     12/12/2023 1440     09/20/2016 0805     06/16/2015 1016    K 3.9 01/24/2024 1014    K 3.9 12/12/2023 1440    K 4.3 09/20/2016 0805    K 3.9 06/16/2015 1016     09/20/2016 0805     06/16/2015 1016    CO2 23 01/24/2024 1014    CO2 25 12/12/2023 1440    CO2 25 09/20/2016 0805    CO2 24 06/16/2015 1016    BUN 8.6 01/24/2024 1014    BUN 8.5 12/12/2023 1440    BUN 7 09/20/2016 0805    BUN 6 06/16/2015 1016    CREATININE 0.82 01/24/2024 1014    CREATININE 0.83 12/12/2023 1440    CREATININE 0.82 10/09/2023 0854    CREATININE 0.86 07/28/2023 0752    CREATININE 1.0 09/20/2016 0805    CREATININE 0.9 06/16/2015 1016    CREATININE 0.9 11/28/2012 0805    CREATININE 0.7 07/22/2010 0805    CALCIUM 9.5 01/24/2024 1014    CALCIUM 9.5 12/12/2023 1440    CALCIUM 9.0 09/20/2016 0805    CALCIUM 9.2 06/16/2015 1016    PHOS 3.7 12/12/2023 1440    PHOS 4.4 07/06/2023 0814           Component Value Date/Time    WBC 6.31 12/12/2023 1440    WBC 5.20 10/09/2023 0854    WBC 4.50 09/20/2016 0805    WBC 4.54 06/16/2015 1016    HGB 12.7 12/12/2023 1440    HGB 13.2 10/09/2023 0854    HGB 12.6 09/20/2016 0805    HGB 13.0  06/16/2015 1016    HCT 39.6 12/12/2023 1440    HCT 41.8 10/09/2023 0854    HCT 39.9 09/20/2016 0805    HCT 39.2 06/16/2015 1016     12/12/2023 1440     10/09/2023 0854     09/20/2016 0805     06/16/2015 1016       Urine Creatinine   Date Value Ref Range Status   01/24/2024 89.3 45.0 - 106.0 mg/dL Final   12/12/2023 165.0 (H) 45.0 - 106.0 mg/dL Final       Urine Protein Level   Date Value Ref Range Status   01/24/2024 <6.8 mg/dL Final   12/12/2023 16.5 mg/dL Final           Imaging:  Retroperitoneal US:      Impression:  1. Membranous nephropathy determined by biopsy    2. Hypertension, unspecified type    3. Microscopic hematuria    4. Persistent proteinuria          Patient completing steroid weaning.  Proteinuria and microscopic hematuria improving.  We will repeat urine in 2 weeks.  Follow-up in 6 weeks with labs in 1 month.  BP much better.  Continue current regimen.  Discussed home BP monitoring.        Carola Reyna [facility]         This note was created with the assistance of [employer] voice recognition software or phone dictation. There may be transcription errors as a result of using this technology however minimal. Effort has been made to assure accuracy of transcription but any obvious errors or omissions should be clarified with the author of the document.

## 2024-01-26 LAB
MAYO GENERIC ORDERABLE RESULT: NORMAL
PLA2R IGG SER IA-ACNC: <2 RU/ML
THSD7A IGG SERPL QL IF: NEGATIVE

## 2024-02-09 ENCOUNTER — LAB VISIT (OUTPATIENT)
Dept: LAB | Facility: HOSPITAL | Age: 49
End: 2024-02-09
Attending: NURSE PRACTITIONER
Payer: COMMERCIAL

## 2024-02-09 DIAGNOSIS — N02.2 MEMBRANOUS NEPHROPATHY DETERMINED BY BIOPSY: ICD-10-CM

## 2024-02-09 DIAGNOSIS — I10 HYPERTENSION, UNSPECIFIED TYPE: ICD-10-CM

## 2024-02-09 DIAGNOSIS — R80.1 PERSISTENT PROTEINURIA: ICD-10-CM

## 2024-02-09 DIAGNOSIS — R31.29 MICROSCOPIC HEMATURIA: ICD-10-CM

## 2024-02-09 LAB
APPEARANCE UR: CLEAR
BACTERIA #/AREA URNS AUTO: ABNORMAL /HPF
BILIRUB UR QL STRIP.AUTO: NEGATIVE
COLOR UR AUTO: ABNORMAL
CREAT UR-MCNC: 94.8 MG/DL (ref 45–106)
GLUCOSE UR QL STRIP.AUTO: NEGATIVE
KETONES UR QL STRIP.AUTO: NEGATIVE
LEUKOCYTE ESTERASE UR QL STRIP.AUTO: NEGATIVE
NITRITE UR QL STRIP.AUTO: NEGATIVE
PH UR STRIP.AUTO: 5.5 [PH]
PROT UR QL STRIP.AUTO: NEGATIVE
PROT UR STRIP-MCNC: <6.8 MG/DL
RBC #/AREA URNS AUTO: ABNORMAL /HPF
RBC UR QL AUTO: ABNORMAL
SP GR UR STRIP.AUTO: 1.01 (ref 1–1.03)
SQUAMOUS #/AREA URNS AUTO: ABNORMAL /HPF
UROBILINOGEN UR STRIP-ACNC: 0.2
WBC #/AREA URNS AUTO: ABNORMAL /HPF

## 2024-02-09 PROCEDURE — 82570 ASSAY OF URINE CREATININE: CPT

## 2024-02-09 PROCEDURE — 81003 URINALYSIS AUTO W/O SCOPE: CPT

## 2024-02-22 DIAGNOSIS — E78.5 HYPERLIPIDEMIA, UNSPECIFIED HYPERLIPIDEMIA TYPE: ICD-10-CM

## 2024-02-22 RX ORDER — ROSUVASTATIN CALCIUM 20 MG/1
20 TABLET, COATED ORAL
Qty: 90 TABLET | Refills: 0 | Status: SHIPPED | OUTPATIENT
Start: 2024-02-22 | End: 2024-05-13

## 2024-03-07 ENCOUNTER — LAB VISIT (OUTPATIENT)
Dept: LAB | Facility: HOSPITAL | Age: 49
End: 2024-03-07
Attending: NURSE PRACTITIONER
Payer: COMMERCIAL

## 2024-03-07 DIAGNOSIS — I10 HYPERTENSION, UNSPECIFIED TYPE: ICD-10-CM

## 2024-03-07 DIAGNOSIS — R80.9 PROTEINURIA, UNSPECIFIED TYPE: ICD-10-CM

## 2024-03-07 DIAGNOSIS — N05.2 MEMBRANOUS GLOMERULONEPHRITIS: ICD-10-CM

## 2024-03-07 LAB
ALBUMIN SERPL-MCNC: 3.8 G/DL (ref 3.5–5)
ALBUMIN/GLOB SERPL: 1.4 RATIO (ref 1.1–2)
ALP SERPL-CCNC: 35 UNIT/L (ref 40–150)
ALT SERPL-CCNC: 15 UNIT/L (ref 0–55)
APPEARANCE UR: CLEAR
AST SERPL-CCNC: 18 UNIT/L (ref 5–34)
BACTERIA #/AREA URNS AUTO: NORMAL /HPF
BASOPHILS # BLD AUTO: 0.03 X10(3)/MCL
BASOPHILS NFR BLD AUTO: 0.7 %
BILIRUB SERPL-MCNC: 0.9 MG/DL
BILIRUB UR QL STRIP.AUTO: NEGATIVE
BUN SERPL-MCNC: 11.6 MG/DL (ref 7–18.7)
CALCIUM SERPL-MCNC: 9.2 MG/DL (ref 8.4–10.2)
CHLORIDE SERPL-SCNC: 108 MMOL/L (ref 98–107)
CO2 SERPL-SCNC: 26 MMOL/L (ref 22–29)
COLOR UR AUTO: ABNORMAL
CREAT SERPL-MCNC: 0.83 MG/DL (ref 0.55–1.02)
CREAT UR-MCNC: 29.9 MG/DL (ref 45–106)
EOSINOPHIL # BLD AUTO: 0.13 X10(3)/MCL (ref 0–0.9)
EOSINOPHIL NFR BLD AUTO: 3.2 %
ERYTHROCYTE [DISTWIDTH] IN BLOOD BY AUTOMATED COUNT: 13.7 % (ref 11.5–17)
GFR SERPLBLD CREATININE-BSD FMLA CKD-EPI: >60 MLS/MIN/1.73/M2
GLOBULIN SER-MCNC: 2.8 GM/DL (ref 2.4–3.5)
GLUCOSE SERPL-MCNC: 112 MG/DL (ref 74–100)
GLUCOSE UR QL STRIP.AUTO: NEGATIVE
HCT VFR BLD AUTO: 38.1 % (ref 37–47)
HGB BLD-MCNC: 12.2 G/DL (ref 12–16)
IMM GRANULOCYTES # BLD AUTO: 0.01 X10(3)/MCL (ref 0–0.04)
IMM GRANULOCYTES NFR BLD AUTO: 0.2 %
KETONES UR QL STRIP.AUTO: NEGATIVE
LEUKOCYTE ESTERASE UR QL STRIP.AUTO: NEGATIVE
LYMPHOCYTES # BLD AUTO: 1.67 X10(3)/MCL (ref 0.6–4.6)
LYMPHOCYTES NFR BLD AUTO: 40.6 %
MCH RBC QN AUTO: 28.2 PG (ref 27–31)
MCHC RBC AUTO-ENTMCNC: 32 G/DL (ref 33–36)
MCV RBC AUTO: 88 FL (ref 80–94)
MONOCYTES # BLD AUTO: 0.37 X10(3)/MCL (ref 0.1–1.3)
MONOCYTES NFR BLD AUTO: 9 %
NEUTROPHILS # BLD AUTO: 1.9 X10(3)/MCL (ref 2.1–9.2)
NEUTROPHILS NFR BLD AUTO: 46.3 %
NITRITE UR QL STRIP.AUTO: NEGATIVE
NRBC BLD AUTO-RTO: 0 %
PH UR STRIP.AUTO: 6.5 [PH]
PLATELET # BLD AUTO: 276 X10(3)/MCL (ref 130–400)
PMV BLD AUTO: 10.4 FL (ref 7.4–10.4)
POTASSIUM SERPL-SCNC: 4.2 MMOL/L (ref 3.5–5.1)
PROT SERPL-MCNC: 6.6 GM/DL (ref 6.4–8.3)
PROT UR QL STRIP.AUTO: NEGATIVE
PROT UR STRIP-MCNC: <6.8 MG/DL
RBC # BLD AUTO: 4.33 X10(6)/MCL (ref 4.2–5.4)
RBC #/AREA URNS AUTO: NORMAL /HPF
RBC UR QL AUTO: ABNORMAL
SODIUM SERPL-SCNC: 140 MMOL/L (ref 136–145)
SP GR UR STRIP.AUTO: <=1.005 (ref 1–1.03)
SQUAMOUS #/AREA URNS AUTO: NORMAL /HPF
UROBILINOGEN UR STRIP-ACNC: 0.2
WBC # SPEC AUTO: 4.11 X10(3)/MCL (ref 4.5–11.5)
WBC #/AREA URNS AUTO: NORMAL /HPF

## 2024-03-07 PROCEDURE — 86255 FLUORESCENT ANTIBODY SCREEN: CPT

## 2024-03-07 PROCEDURE — 83520 IMMUNOASSAY QUANT NOS NONAB: CPT

## 2024-03-07 PROCEDURE — 85025 COMPLETE CBC W/AUTO DIFF WBC: CPT

## 2024-03-07 PROCEDURE — 36415 COLL VENOUS BLD VENIPUNCTURE: CPT

## 2024-03-07 PROCEDURE — 81001 URINALYSIS AUTO W/SCOPE: CPT

## 2024-03-12 ENCOUNTER — OFFICE VISIT (OUTPATIENT)
Dept: NEPHROLOGY | Facility: CLINIC | Age: 49
End: 2024-03-12
Payer: COMMERCIAL

## 2024-03-12 VITALS
TEMPERATURE: 98 F | OXYGEN SATURATION: 99 % | DIASTOLIC BLOOD PRESSURE: 82 MMHG | SYSTOLIC BLOOD PRESSURE: 124 MMHG | RESPIRATION RATE: 20 BRPM | HEART RATE: 81 BPM | HEIGHT: 65 IN | BODY MASS INDEX: 37.76 KG/M2 | WEIGHT: 226.63 LBS

## 2024-03-12 DIAGNOSIS — R80.1 PERSISTENT PROTEINURIA: ICD-10-CM

## 2024-03-12 DIAGNOSIS — I10 HYPERTENSION, UNSPECIFIED TYPE: ICD-10-CM

## 2024-03-12 DIAGNOSIS — N05.2 MEMBRANOUS GLOMERULONEPHRITIS: Primary | ICD-10-CM

## 2024-03-12 DIAGNOSIS — R31.29 MICROSCOPIC HEMATURIA: ICD-10-CM

## 2024-03-12 DIAGNOSIS — N02.2 MEMBRANOUS NEPHROPATHY DETERMINED BY BIOPSY: Primary | ICD-10-CM

## 2024-03-12 PROCEDURE — 3066F NEPHROPATHY DOC TX: CPT | Mod: CPTII,S$GLB,, | Performed by: INTERNAL MEDICINE

## 2024-03-12 PROCEDURE — 3079F DIAST BP 80-89 MM HG: CPT | Mod: CPTII,S$GLB,, | Performed by: INTERNAL MEDICINE

## 2024-03-12 PROCEDURE — 99999 PR PBB SHADOW E&M-EST. PATIENT-LVL IV: CPT | Mod: PBBFAC,,, | Performed by: INTERNAL MEDICINE

## 2024-03-12 PROCEDURE — 99213 OFFICE O/P EST LOW 20 MIN: CPT | Mod: S$GLB,,, | Performed by: INTERNAL MEDICINE

## 2024-03-12 PROCEDURE — 3074F SYST BP LT 130 MM HG: CPT | Mod: CPTII,S$GLB,, | Performed by: INTERNAL MEDICINE

## 2024-03-12 PROCEDURE — 4010F ACE/ARB THERAPY RXD/TAKEN: CPT | Mod: CPTII,S$GLB,, | Performed by: INTERNAL MEDICINE

## 2024-03-12 PROCEDURE — 3008F BODY MASS INDEX DOCD: CPT | Mod: CPTII,S$GLB,, | Performed by: INTERNAL MEDICINE

## 2024-03-12 PROCEDURE — 1159F MED LIST DOCD IN RCRD: CPT | Mod: CPTII,S$GLB,, | Performed by: INTERNAL MEDICINE

## 2024-03-12 RX ORDER — MYCOPHENOLATE MOFETIL 500 MG/1
500 TABLET ORAL 2 TIMES DAILY
Qty: 60 TABLET | Refills: 1 | Status: SHIPPED | OUTPATIENT
Start: 2024-03-12 | End: 2024-06-19

## 2024-03-12 NOTE — PROGRESS NOTES
[facility] [place] [facility] Progress Note      HPI  Maria E Lopez, 48 y.o. female, presents to office for a follow up visit for membranous nephropathy.  Patient has responded nicely to steroids and CellCept currently we will been weaning steroids she is on negligible dose and urine showed no proteinuria stable renal function she feels overall fine denies any major complaints    Patient denies taking NSAIDs or new antibiotics. Also denies recent episode of dehydration, diarrhea, vomiting, acute illness, hospitalization, recent angiograms or exposure to IV radiocontrast.        Medical Diagnoses:   Past Medical History:   Diagnosis Date    Chronic constipation     Hashimoto's thyroiditis     Hyperlipidemia     Hypertension     Hypothyroidism     Obesity     Osteoarthritis     Prediabetes      Patient Active Problem List   Diagnosis    Acquired hypothyroidism    Chronic constipation    Severe obesity (BMI 35.0-39.9) with comorbidity    Prediabetes    Microscopic hematuria    Osteoarthritis of right knee    Hypertension    Hyperlipidemia    Other proteinuria    Drug-induced immunodeficiency    Autoimmune disease    Membranous glomerulonephritis       Surgical History:   Past Surgical History:   Procedure Laterality Date    ARTHROPLASTY  10/26/2021    BIOPSY OF LESION      JOINT REPLACEMENT  Right Knee    2021    replacement of rt knee joint Right 10/26/2021    rt knee arthroscopy         Family History:   Family History   Problem Relation Age of Onset    Thyroid disease Mother     Diabetes Mother     Hypertension Mother     Arthritis Mother     Liver cancer Father     Heart disease Father     COPD Father     Alcohol abuse Father     Heart disease Paternal Grandfather     Cancer Paternal Grandmother     Stroke Maternal Grandfather        Social History:   Social History     Tobacco Use    Smoking status: Never     Passive exposure: Never    Smokeless tobacco: Never   Substance Use Topics    Alcohol use: Never  "      Allergies:  Review of patient's allergies indicates:  No Known Allergies    Medications:    Current Outpatient Medications:     IBSRELA 50 mg Tab, Take 50 tablets by mouth 2 (two) times a day., Disp: , Rfl:     levonorgestreL (LILETTA) 20.1 mcg/24 hrs (6 yrs) 52 mg IUD, 52 mg by Intrauterine route., Disp: , Rfl:     levothyroxine (SYNTHROID) 100 MCG tablet, Take 1 tablet (100 mcg total) by mouth once daily., Disp: 90 tablet, Rfl: 3    liothyronine (CYTOMEL) 5 MCG Tab, Take 1 tablet (5 mcg total) by mouth once daily., Disp: 90 tablet, Rfl: 3    losartan (COZAAR) 50 MG tablet, Take 1 tablet (50 mg total) by mouth once daily., Disp: 30 tablet, Rfl: 11    mycophenolate (CELLCEPT) 500 mg Tab, Take 500 mg by mouth 2 (two) times daily., Disp: , Rfl:     omeprazole (PRILOSEC) 20 MG capsule, Take by mouth Daily., Disp: , Rfl:     rosuvastatin (CRESTOR) 20 MG tablet, Take 1 tablet by mouth once daily, Disp: 90 tablet, Rfl: 0       Review of Systems:    Constitutional: Denies fever, fatigue, generalized weakness  Skin: Denies wounds, no rashes, no itching, no new skin lesions  Respiratory:  Denies cough, shortness of breath, or wheezing  Cardiovascular: Denies chest pain, palpitations, or swelling  Gastrointestional: Denies abdominal pain, nausea, vomiting, diarrhea, or constipation  Genitourinary: Denies dysuria, hematuria, foamy urine, or incontinence; reports able to empty bladder  Musculoskeletal: Denies back or flank pain  Neurological: Denies headaches, dizziness, paresthesias, tremors or focal weakness      Vital Signs:  /82 (BP Location: Left arm, Patient Position: Sitting)   Pulse 81   Temp 98.2 °F (36.8 °C) (Oral)   Resp 20   Ht 5' 5" (1.651 m)   Wt 102.8 kg (226 lb 9.6 oz)   SpO2 99%   BMI 37.71 kg/m²   Body mass index is 37.71 kg/m².      Physical Exam:    General: no acute distress, awake, alert  Eyes: conjunctiva clear, eyelids without swelling  HENT: atraumatic, oropharynx and nasal mucosa " patent  Neck: supple, trache midline, no JVD  Respiratory: equal, unlabored, clear to auscultation A/P  Cardiovascular: RRR without murmur or rub  Edema: none  Gastrointestinal: soft, non-tender, non-distended; positive bowel sounds; no masses to palpation; no ascites  Genitourinary: no CVA tenderness upon palpation  Musculoskeletal: ROM without new limitation or discomfort  Integumentary: warm, dry; no rashes, wounds, or skin lesions  Neurological: oriented x4, appropriate, no acute deficits; no asterixis      Labs:        Component Value Date/Time     03/07/2024 0825     01/24/2024 1014     09/20/2016 0805     06/16/2015 1016    K 4.2 03/07/2024 0825    K 3.9 01/24/2024 1014    K 4.3 09/20/2016 0805    K 3.9 06/16/2015 1016     09/20/2016 0805     06/16/2015 1016    CO2 26 03/07/2024 0825    CO2 23 01/24/2024 1014    CO2 25 09/20/2016 0805    CO2 24 06/16/2015 1016    BUN 11.6 03/07/2024 0825    BUN 8.6 01/24/2024 1014    BUN 7 09/20/2016 0805    BUN 6 06/16/2015 1016    CREATININE 0.83 03/07/2024 0825    CREATININE 0.82 01/24/2024 1014    CREATININE 0.83 12/12/2023 1440    CREATININE 0.82 10/09/2023 0854    CREATININE 1.0 09/20/2016 0805    CREATININE 0.9 06/16/2015 1016    CREATININE 0.9 11/28/2012 0805    CREATININE 0.7 07/22/2010 0805    CALCIUM 9.2 03/07/2024 0825    CALCIUM 9.5 01/24/2024 1014    CALCIUM 9.0 09/20/2016 0805    CALCIUM 9.2 06/16/2015 1016    PHOS 3.7 12/12/2023 1440    PHOS 4.4 07/06/2023 0814           Component Value Date/Time    WBC 4.11 (L) 03/07/2024 0825    WBC 6.31 12/12/2023 1440    WBC 4.50 09/20/2016 0805    WBC 4.54 06/16/2015 1016    HGB 12.2 03/07/2024 0825    HGB 12.7 12/12/2023 1440    HGB 12.6 09/20/2016 0805    HGB 13.0 06/16/2015 1016    HCT 38.1 03/07/2024 0825    HCT 39.6 12/12/2023 1440    HCT 39.9 09/20/2016 0805    HCT 39.2 06/16/2015 1016     03/07/2024 0825     12/12/2023 1440     09/20/2016 0805      06/16/2015 1016       Urine Creatinine   Date Value Ref Range Status   03/07/2024 29.9 (L) 45.0 - 106.0 mg/dL Final   02/09/2024 94.8 45.0 - 106.0 mg/dL Final       Urine Protein Level   Date Value Ref Range Status   03/07/2024 <6.8 mg/dL Final   02/09/2024 <6.8 mg/dL Final           Imaging:  Retroperitoneal Ultrasound:      Impression:    1. Membranous nephropathy determined by biopsy        2. Hypertension, unspecified type        3. Microscopic hematuria        4. Persistent proteinuria        No evidence of proteinuria activation of the disease      Plan:      DC prednisone  Labs and urine studies in a month if stable labs urine studies and follow-up visit in 3 months and if stable we will start cutting down the CellCept to 250 b.i.d. with gradual weaning as tolerated       Kat Gustafson      This note was created with the assistance of [employer] voice recognition software or phone dictation. There may be transcription errors as a result of using this technology however minimal. Effort has been made to assure accuracy of transcription but any obvious errors or omissions should be clarified with the author of the document.

## 2024-04-03 ENCOUNTER — PATIENT MESSAGE (OUTPATIENT)
Dept: FAMILY MEDICINE | Facility: CLINIC | Age: 49
End: 2024-04-03
Payer: COMMERCIAL

## 2024-04-04 ENCOUNTER — HOSPITAL ENCOUNTER (OUTPATIENT)
Dept: RADIOLOGY | Facility: HOSPITAL | Age: 49
Discharge: HOME | End: 2024-04-04
Attending: INTERNAL MEDICINE
Payer: COMMERCIAL

## 2024-04-04 DIAGNOSIS — Z12.31 ENCOUNTER FOR SCREENING MAMMOGRAM FOR BREAST CANCER: ICD-10-CM

## 2024-04-04 PROCEDURE — 77063 BREAST TOMOSYNTHESIS BI: CPT | Mod: 26,,, | Performed by: RADIOLOGY

## 2024-04-04 PROCEDURE — 77067 SCR MAMMO BI INCL CAD: CPT | Mod: TC

## 2024-04-04 PROCEDURE — 77067 SCR MAMMO BI INCL CAD: CPT | Mod: 26,,, | Performed by: RADIOLOGY

## 2024-04-05 ENCOUNTER — TELEPHONE (OUTPATIENT)
Dept: FAMILY MEDICINE | Facility: CLINIC | Age: 49
End: 2024-04-05
Payer: COMMERCIAL

## 2024-04-05 NOTE — TELEPHONE ENCOUNTER
----- Message from Bob Ontiveros MD sent at 4/5/2024  7:29 AM CDT -----  Please let patient know their mammogram did not reveal any evidence of breast cancer. Thanks

## 2024-04-08 ENCOUNTER — OFFICE VISIT (OUTPATIENT)
Dept: FAMILY MEDICINE | Facility: CLINIC | Age: 49
End: 2024-04-08
Payer: COMMERCIAL

## 2024-04-08 ENCOUNTER — HOSPITAL ENCOUNTER (OUTPATIENT)
Dept: RADIOLOGY | Facility: HOSPITAL | Age: 49
Discharge: HOME | End: 2024-04-08
Attending: FAMILY MEDICINE
Payer: COMMERCIAL

## 2024-04-08 VITALS
OXYGEN SATURATION: 97 % | DIASTOLIC BLOOD PRESSURE: 85 MMHG | BODY MASS INDEX: 36.14 KG/M2 | HEART RATE: 82 BPM | RESPIRATION RATE: 18 BRPM | WEIGHT: 216.88 LBS | HEIGHT: 65 IN | TEMPERATURE: 99 F | SYSTOLIC BLOOD PRESSURE: 129 MMHG

## 2024-04-08 DIAGNOSIS — M25.562 CHRONIC PAIN OF LEFT KNEE: ICD-10-CM

## 2024-04-08 DIAGNOSIS — D84.821 DRUG-INDUCED IMMUNODEFICIENCY: ICD-10-CM

## 2024-04-08 DIAGNOSIS — G89.29 CHRONIC PAIN OF LEFT KNEE: Primary | ICD-10-CM

## 2024-04-08 DIAGNOSIS — M35.9 AUTOIMMUNE DISEASE: ICD-10-CM

## 2024-04-08 DIAGNOSIS — Z79.899 DRUG-INDUCED IMMUNODEFICIENCY: ICD-10-CM

## 2024-04-08 DIAGNOSIS — M25.562 CHRONIC PAIN OF LEFT KNEE: Primary | ICD-10-CM

## 2024-04-08 DIAGNOSIS — G89.29 CHRONIC PAIN OF LEFT KNEE: ICD-10-CM

## 2024-04-08 PROCEDURE — 3066F NEPHROPATHY DOC TX: CPT | Mod: CPTII,,, | Performed by: FAMILY MEDICINE

## 2024-04-08 PROCEDURE — 3074F SYST BP LT 130 MM HG: CPT | Mod: CPTII,,, | Performed by: FAMILY MEDICINE

## 2024-04-08 PROCEDURE — 99213 OFFICE O/P EST LOW 20 MIN: CPT | Mod: ,,, | Performed by: FAMILY MEDICINE

## 2024-04-08 PROCEDURE — 3079F DIAST BP 80-89 MM HG: CPT | Mod: CPTII,,, | Performed by: FAMILY MEDICINE

## 2024-04-08 PROCEDURE — 1160F RVW MEDS BY RX/DR IN RCRD: CPT | Mod: CPTII,,, | Performed by: FAMILY MEDICINE

## 2024-04-08 PROCEDURE — 73562 X-RAY EXAM OF KNEE 3: CPT | Mod: TC,LT

## 2024-04-08 PROCEDURE — 4010F ACE/ARB THERAPY RXD/TAKEN: CPT | Mod: CPTII,,, | Performed by: FAMILY MEDICINE

## 2024-04-08 PROCEDURE — 1159F MED LIST DOCD IN RCRD: CPT | Mod: CPTII,,, | Performed by: FAMILY MEDICINE

## 2024-04-08 PROCEDURE — 3008F BODY MASS INDEX DOCD: CPT | Mod: CPTII,,, | Performed by: FAMILY MEDICINE

## 2024-04-10 ENCOUNTER — PATIENT MESSAGE (OUTPATIENT)
Dept: FAMILY MEDICINE | Facility: CLINIC | Age: 49
End: 2024-04-10
Payer: COMMERCIAL

## 2024-04-10 NOTE — PROGRESS NOTES
Subjective:      Patient ID: Maria E Lopez is a 48 y.o. female.    Chief Complaint: Referral (Rheumatologist. Left knee and back pain.)    Disclaimer:  This note is prepared using voice recognition software and as such is likely to have errors despite attempts at proofreading. Please contact me for questions.   48 year old female with history of Hashimoto's thyroiditis and membranous glomerular nephritis presents to requests referral for Rheumatology.  She admits to history of joint pain especially the left knee.  She is concerned about possibility of other autoimmune diseases.  She denies previous workup for lupus or rheumatoid arthritis.    Past Medical History:   Diagnosis Date    Chronic constipation     Hashimoto's thyroiditis     Hyperlipidemia     Hypertension     Hypothyroidism     Obesity     Osteoarthritis     Prediabetes         Current Outpatient Medications on File Prior to Visit   Medication Sig Dispense Refill    IBSRELA 50 mg Tab Take 50 tablets by mouth 2 (two) times a day.      levonorgestreL (LILETTA) 20.1 mcg/24 hrs (6 yrs) 52 mg IUD 52 mg by Intrauterine route.      levothyroxine (SYNTHROID) 100 MCG tablet Take 1 tablet (100 mcg total) by mouth once daily. 90 tablet 3    liothyronine (CYTOMEL) 5 MCG Tab Take 1 tablet (5 mcg total) by mouth once daily. 90 tablet 3    losartan (COZAAR) 50 MG tablet Take 1 tablet (50 mg total) by mouth once daily. 30 tablet 11    mycophenolate (CELLCEPT) 500 mg Tab Take 1 tablet (500 mg total) by mouth 2 (two) times daily. 60 tablet 1    omeprazole (PRILOSEC) 20 MG capsule Take by mouth Daily.      rosuvastatin (CRESTOR) 20 MG tablet Take 1 tablet by mouth once daily 90 tablet 0     No current facility-administered medications on file prior to visit.        Review of patient's allergies indicates:  No Known Allergies       Review of Systems   Constitutional:  Negative for chills, diaphoresis and fever.   Eyes:  Negative for blurred vision and double  "vision.   Respiratory:  Negative for cough and shortness of breath.    Cardiovascular:  Negative for chest pain and leg swelling.   Gastrointestinal:  Negative for abdominal pain, diarrhea, nausea and vomiting.   Musculoskeletal:  Positive for joint pain and myalgias. Negative for falls.   Skin:  Negative for rash.   Neurological:  Negative for dizziness, tremors and headaches.       Objective:     Vitals:    04/08/24 1103   BP: 129/85   BP Location: Right arm   Patient Position: Sitting   Pulse: 82   Resp: 18   Temp: 98.6 °F (37 °C)   TempSrc: Oral   SpO2: 97%   Weight: 98.4 kg (216 lb 14.4 oz)   Height: 5' 5" (1.651 m)     Physical Exam  Vitals reviewed.   Constitutional:       General: She is not in acute distress.     Appearance: Normal appearance. She is not ill-appearing, toxic-appearing or diaphoretic.   HENT:      Head: Normocephalic.   Eyes:      General:         Right eye: No discharge.         Left eye: No discharge.      Extraocular Movements: Extraocular movements intact.      Conjunctiva/sclera: Conjunctivae normal.   Cardiovascular:      Rate and Rhythm: Normal rate and regular rhythm.      Pulses: Normal pulses.      Heart sounds: Normal heart sounds. No murmur heard.     No friction rub. No gallop.   Pulmonary:      Effort: Pulmonary effort is normal. No respiratory distress.      Breath sounds: Normal breath sounds. No stridor. No wheezing, rhonchi or rales.   Musculoskeletal:         General: Normal range of motion.      Cervical back: Normal range of motion and neck supple. No rigidity.      Right knee: No swelling, effusion, erythema, ecchymosis or bony tenderness. Normal range of motion. No tenderness.      Left knee: Crepitus present. No swelling, effusion, erythema, ecchymosis or bony tenderness. Normal range of motion. Tenderness (very mild peripatellar) present. No LCL laxity, MCL laxity, ACL laxity or PCL laxity.Normal alignment and normal patellar mobility. Normal pulse.      Instability " Tests: Anterior drawer test negative. Posterior drawer test negative. Medial Jayce test negative.   Skin:     General: Skin is warm and dry.      Coloration: Skin is not pale.   Neurological:      General: No focal deficit present.      Mental Status: She is alert and oriented to person, place, and time. Mental status is at baseline.   Psychiatric:         Mood and Affect: Mood normal.         Behavior: Behavior normal.         Thought Content: Thought content normal.         Judgment: Judgment normal.         Assessment:     1. Chronic pain of left knee    2. Autoimmune disease    3. Drug-induced immunodeficiency      Plan:     1. Autoimmune disease  - Sedimentation rate; Future  - C-reactive protein; Future  - GEOVANI; Future  - Cyclic citrul peptide antibody, IgG; Future  - Hepatitis C antibody; Future  - Ambulatory referral/consult to Rheumatology; Future  - Rheumatoid Factors, IgA, IgG, IgM; Future  - DSDNA; Future    2. Drug-induced immunodeficiency  - Sedimentation rate; Future  - C-reactive protein; Future  - GEOVANI; Future  - Cyclic citrul peptide antibody, IgG; Future  - Hepatitis C antibody; Future  - Ambulatory referral/consult to Rheumatology; Future  - Rheumatoid Factors, IgA, IgG, IgM; Future  - DSDNA; Future    3. Chronic pain of left knee  - X-Ray Knee 3 View Left; Future  Patient not interested in prescribed analgesics.  Tylenol as needed for pain.  XR left knee ordered and pending.    Follow up if symptoms worsen or fail to improve.  Maria E Lopez was given education on their disease process and medications.

## 2024-04-18 ENCOUNTER — PATIENT MESSAGE (OUTPATIENT)
Dept: FAMILY MEDICINE | Facility: CLINIC | Age: 49
End: 2024-04-18
Payer: COMMERCIAL

## 2024-04-26 ENCOUNTER — OFFICE VISIT (OUTPATIENT)
Dept: ORTHOPEDICS | Facility: CLINIC | Age: 49
End: 2024-04-26
Payer: COMMERCIAL

## 2024-04-26 VITALS
HEART RATE: 75 BPM | DIASTOLIC BLOOD PRESSURE: 88 MMHG | SYSTOLIC BLOOD PRESSURE: 131 MMHG | HEIGHT: 65 IN | WEIGHT: 223 LBS | BODY MASS INDEX: 37.15 KG/M2

## 2024-04-26 DIAGNOSIS — S83.242A ACUTE MEDIAL MENISCUS TEAR OF LEFT KNEE, INITIAL ENCOUNTER: Primary | ICD-10-CM

## 2024-04-26 PROCEDURE — 3079F DIAST BP 80-89 MM HG: CPT | Mod: CPTII,,, | Performed by: PHYSICIAN ASSISTANT

## 2024-04-26 PROCEDURE — 3008F BODY MASS INDEX DOCD: CPT | Mod: CPTII,,, | Performed by: PHYSICIAN ASSISTANT

## 2024-04-26 PROCEDURE — 1159F MED LIST DOCD IN RCRD: CPT | Mod: CPTII,,, | Performed by: PHYSICIAN ASSISTANT

## 2024-04-26 PROCEDURE — 99213 OFFICE O/P EST LOW 20 MIN: CPT | Mod: 25,,, | Performed by: PHYSICIAN ASSISTANT

## 2024-04-26 PROCEDURE — 20610 DRAIN/INJ JOINT/BURSA W/O US: CPT | Mod: LT,,, | Performed by: PHYSICIAN ASSISTANT

## 2024-04-26 PROCEDURE — 1160F RVW MEDS BY RX/DR IN RCRD: CPT | Mod: CPTII,,, | Performed by: PHYSICIAN ASSISTANT

## 2024-04-26 PROCEDURE — 4010F ACE/ARB THERAPY RXD/TAKEN: CPT | Mod: CPTII,,, | Performed by: PHYSICIAN ASSISTANT

## 2024-04-26 PROCEDURE — 3075F SYST BP GE 130 - 139MM HG: CPT | Mod: CPTII,,, | Performed by: PHYSICIAN ASSISTANT

## 2024-04-26 PROCEDURE — 3066F NEPHROPATHY DOC TX: CPT | Mod: CPTII,,, | Performed by: PHYSICIAN ASSISTANT

## 2024-04-26 RX ORDER — BETAMETHASONE SODIUM PHOSPHATE AND BETAMETHASONE ACETATE 3; 3 MG/ML; MG/ML
12 INJECTION, SUSPENSION INTRA-ARTICULAR; INTRALESIONAL; INTRAMUSCULAR; SOFT TISSUE
Status: DISCONTINUED | OUTPATIENT
Start: 2024-04-26 | End: 2024-04-26 | Stop reason: HOSPADM

## 2024-04-26 RX ORDER — ACETAMINOPHEN 500 MG
500 TABLET ORAL EVERY 6 HOURS PRN
COMMUNITY

## 2024-04-26 RX ORDER — GUAIFENESIN AND PHENYLEPHRINE HCL 400; 10 MG/1; MG/1
TABLET ORAL
COMMUNITY

## 2024-04-26 RX ORDER — DICLOFENAC SODIUM 75 MG/1
75 TABLET, DELAYED RELEASE ORAL 2 TIMES DAILY
Qty: 60 TABLET | Refills: 0 | Status: SHIPPED | OUTPATIENT
Start: 2024-04-26 | End: 2024-05-26

## 2024-04-26 RX ADMIN — BETAMETHASONE SODIUM PHOSPHATE AND BETAMETHASONE ACETATE 12 MG: 3; 3 INJECTION, SUSPENSION INTRA-ARTICULAR; INTRALESIONAL; INTRAMUSCULAR; SOFT TISSUE at 08:04

## 2024-04-26 NOTE — PROGRESS NOTES
Chief Complaint:   Chief Complaint   Patient presents with    Left Knee - Pain     Pt states she has been experiencing pain for 3 weeks. Pain feels like a pulling, ache around the patella with mild swelling at times. Pt had recent x-rays done. Pt is taking Tylenol Arthritis and turmeric mild mild temporary relief. Pt never had cortisone in the past.        History of present illness:    48-year-old female presents office today for evaluation for left knee pain.  The pain has been present for 3 weeks with no associated injury.  She is working out regularly at the gym and has increased her activity.  She has medial-sided pain.  There is some catching episodes.  She notes interval swelling.  She has a history of right medial compartment partial knee arthroplasty done 2 years ago.  Currently she denies locking.    Past Medical History:   Diagnosis Date    Chronic constipation     Hashimoto's thyroiditis     Hyperlipidemia     Hypertension     Hypothyroidism     Obesity     Osteoarthritis     Prediabetes        Past Surgical History:   Procedure Laterality Date    ARTHROPLASTY  10/26/2021    BIOPSY OF LESION      JOINT REPLACEMENT  Right Knee    2021    replacement of rt knee joint Right 10/26/2021    rt knee arthroscopy         Current Outpatient Medications   Medication Sig Dispense Refill    acetaminophen (TYLENOL) 500 MG tablet Take 500 mg by mouth every 6 (six) hours as needed for Pain.      IBSRELA 50 mg Tab Take 50 tablets by mouth 2 (two) times a day.      levonorgestreL (LILETTA) 20.1 mcg/24 hrs (6 yrs) 52 mg IUD 52 mg by Intrauterine route.      levothyroxine (SYNTHROID) 100 MCG tablet Take 1 tablet (100 mcg total) by mouth once daily. 90 tablet 3    liothyronine (CYTOMEL) 5 MCG Tab Take 1 tablet (5 mcg total) by mouth once daily. 90 tablet 3    losartan (COZAAR) 50 MG tablet Take 1 tablet (50 mg total) by mouth once daily. 30 tablet 11    mycophenolate (CELLCEPT) 500 mg Tab Take 1 tablet (500 mg total) by  mouth 2 (two) times daily. 60 tablet 1    omeprazole (PRILOSEC) 20 MG capsule Take by mouth Daily.      rosuvastatin (CRESTOR) 20 MG tablet Take 1 tablet by mouth once daily 90 tablet 0    turmeric root extract 500 mg Cap Take by mouth.      diclofenac (VOLTAREN) 75 MG EC tablet Take 1 tablet (75 mg total) by mouth 2 (two) times daily. 60 tablet 0     No current facility-administered medications for this visit.       Review of patient's allergies indicates:  No Known Allergies    Family History   Problem Relation Name Age of Onset    Thyroid disease Mother Nohemy Pena     Diabetes Mother Nohemy Pena     Hypertension Mother Nohemy Pena     Arthritis Mother Nohemy Pena     Liver cancer Father Santiago Pena     Heart disease Father Santiago Pena     COPD Father Santiago Pena     Alcohol abuse Father Santiago Pena     Heart disease Paternal Grandfather Leonard Nichols     Cancer Paternal Grandmother Jolene Washington     Stroke Maternal Grandfather Hood Galeana        Social History     Socioeconomic History    Marital status:    Tobacco Use    Smoking status: Never     Passive exposure: Never    Smokeless tobacco: Never   Substance and Sexual Activity    Alcohol use: Never    Drug use: Never    Sexual activity: Not Currently     Partners: Male     Birth control/protection: I.U.D., None   Social History Narrative    The patient is  and has 3 children.  She works as a .     Social Determinants of Health     Financial Resource Strain: Medium Risk (4/8/2024)    Overall Financial Resource Strain (CARDIA)     Difficulty of Paying Living Expenses: Somewhat hard   Food Insecurity: No Food Insecurity (4/8/2024)    Hunger Vital Sign     Worried About Running Out of Food in the Last Year: Never true     Ran Out of Food in the Last Year: Never true   Transportation Needs: No Transportation Needs (4/8/2024)    PRAPARE - Transportation     Lack of Transportation (Medical): No  "    Lack of Transportation (Non-Medical): No   Physical Activity: Sufficiently Active (4/8/2024)    Exercise Vital Sign     Days of Exercise per Week: 3 days     Minutes of Exercise per Session: 60 min   Stress: No Stress Concern Present (4/8/2024)    [employer] - Occupational Stress Questionnaire     Feeling of Stress : Only a little   Social Connections: Unknown (4/8/2024)    Social Connection and Isolation Panel [NHANES]     Frequency of Communication with Friends and Family: More than three times a week     Frequency of Social Gatherings with Friends and Family: Once a week     Active Member of Clubs or Organizations: Yes     Attends Club or Organization Meetings: More than 4 times per year     Marital Status:    Housing Stability: Unknown (4/8/2024)    Housing Stability Vital Sign     Unable to Pay for Housing in the Last Year: No     Unstable Housing in the Last Year: No         Review of Systems:    Constitution:   Denies chills, fever, and sweats.  HENT:   Denies headaches or blurry vision.  Cardiovascular:  Denies chest pain or irregular heart beat.  Respiratory:   Denies cough or shortness of breath.  Gastrointestinal:  Denies abdominal pain, nausea, or vomiting.  Musculoskeletal:   Denies muscle cramps.  Neurological:   Denies dizziness or focal weakness.  Psychiatric/Behavior: Normal mental status.  Hematology/Lymph:  Denies bleeding problem or easy bruising/bleeding.  Skin:    Denies rash or suspicious lesions.    Examination:    Vital Signs:    Vitals:    04/26/24 0838 04/26/24 0840   BP: 131/88    Pulse: 75    Weight: 101.2 kg (223 lb)    Height: 5' 5" (1.651 m)    PainSc:    5       Body mass index is 37.11 kg/m².    Constitution:   Well-developed, well nourished patient in no acute distress.  Neurological:   Alert and oriented x 3 and cooperative to examination.     Psychiatric/Behavior: Normal mental status.  Respiratory:   No shortness of breath.  Nonlabored " breathing  Cardiovascular:           Regular rate and rhythm  Eyes:    Extraoccular muscles intact  Skin:    No scars, rash or suspicious lesions.    Physical Exam:     Left Knee     Grade effusion 1    No erythema, warmth bruising     active flexion 120   active extension 0    Tender over medial joint line  Tender over MCL   No lateral compartment tenderness   Positive medial  Jayce test for pain  Negative  lachman test   No instability on varus or valgus stress   No weakness of the  knee was observed.  Left knee radiographs, reviewed show no osteoarticular abnormalities.          Assessment:     Acute medial meniscus tear of left knee, initial encounter  -     Large Joint Aspiration/Injection: L knee  -     Ambulatory referral/consult to Physical/Occupational Therapy; Future; Expected date: 05/03/2024    Other orders  -     diclofenac (VOLTAREN) 75 MG EC tablet; Take 1 tablet (75 mg total) by mouth 2 (two) times daily.  Dispense: 60 tablet; Refill: 0        Plan:      Discussed exam and x-ray findings with the patient today.  I do feel that she has a medial meniscus tear and she does state that her symptoms are slightly improving.  I would recommend a corticosteroid injection today, oral diclofenac twice daily with food and physical therapy program.  I will give her a 4-6 week follow-up for repeat evaluation.  If no improvement or worsening of symptoms prior to next visit we have discussed an MRI of the left knee to evaluate for displaced medial meniscus tear.        Follow up in about 6 weeks (around 6/7/2024).    DISCLAIMER: This note may have been dictated using voice recognition software and may contain grammatical errors.

## 2024-04-26 NOTE — PROCEDURES
Large Joint Aspiration/Injection: L knee    Date/Time: 4/26/2024 8:30 AM    Performed by: Conrad Reyes PA  Authorized by: Conrad Reyes PA    Consent Done?:  Yes (Verbal)  Indications:  Arthritis  Timeout: prior to procedure the correct patient, procedure, and site was verified    Prep: patient was prepped and draped in usual sterile fashion      Local anesthesia used?: Yes    Local anesthetic:  Lidocaine 2% without epinephrine    Details:  Needle Size:  25 G  Ultrasonic Guidance for needle placement?: No    Location:  Knee  Site:  L knee  Medications:  12 mg betamethasone acetate-betamethasone sodium phosphate 6 mg/mL  Patient tolerance:  Patient tolerated the procedure well with no immediate complications

## 2024-04-29 ENCOUNTER — OFFICE VISIT (OUTPATIENT)
Dept: FAMILY MEDICINE | Facility: CLINIC | Age: 49
End: 2024-04-29
Payer: COMMERCIAL

## 2024-04-29 VITALS
DIASTOLIC BLOOD PRESSURE: 84 MMHG | OXYGEN SATURATION: 98 % | SYSTOLIC BLOOD PRESSURE: 122 MMHG | HEIGHT: 65 IN | WEIGHT: 224.31 LBS | HEART RATE: 74 BPM | BODY MASS INDEX: 37.37 KG/M2 | TEMPERATURE: 98 F | RESPIRATION RATE: 16 BRPM

## 2024-04-29 DIAGNOSIS — E03.9 ACQUIRED HYPOTHYROIDISM: ICD-10-CM

## 2024-04-29 DIAGNOSIS — E66.01 SEVERE OBESITY (BMI 35.0-39.9) WITH COMORBIDITY: ICD-10-CM

## 2024-04-29 DIAGNOSIS — N05.2 MEMBRANOUS GLOMERULONEPHRITIS: ICD-10-CM

## 2024-04-29 DIAGNOSIS — I10 HYPERTENSION, UNSPECIFIED TYPE: ICD-10-CM

## 2024-04-29 DIAGNOSIS — D70.8 OTHER NEUTROPENIA: ICD-10-CM

## 2024-04-29 DIAGNOSIS — Z00.00 WELLNESS EXAMINATION: Primary | ICD-10-CM

## 2024-04-29 DIAGNOSIS — E78.2 MIXED HYPERLIPIDEMIA: ICD-10-CM

## 2024-04-29 DIAGNOSIS — R73.03 PREDIABETES: ICD-10-CM

## 2024-04-29 PROCEDURE — 3079F DIAST BP 80-89 MM HG: CPT | Mod: CPTII,,, | Performed by: INTERNAL MEDICINE

## 2024-04-29 PROCEDURE — 1160F RVW MEDS BY RX/DR IN RCRD: CPT | Mod: CPTII,,, | Performed by: INTERNAL MEDICINE

## 2024-04-29 PROCEDURE — 3074F SYST BP LT 130 MM HG: CPT | Mod: CPTII,,, | Performed by: INTERNAL MEDICINE

## 2024-04-29 PROCEDURE — 3066F NEPHROPATHY DOC TX: CPT | Mod: CPTII,,, | Performed by: INTERNAL MEDICINE

## 2024-04-29 PROCEDURE — 4010F ACE/ARB THERAPY RXD/TAKEN: CPT | Mod: CPTII,,, | Performed by: INTERNAL MEDICINE

## 2024-04-29 PROCEDURE — 99396 PREV VISIT EST AGE 40-64: CPT | Mod: ,,, | Performed by: INTERNAL MEDICINE

## 2024-04-29 PROCEDURE — 1159F MED LIST DOCD IN RCRD: CPT | Mod: CPTII,,, | Performed by: INTERNAL MEDICINE

## 2024-04-29 PROCEDURE — 3008F BODY MASS INDEX DOCD: CPT | Mod: CPTII,,, | Performed by: INTERNAL MEDICINE

## 2024-04-29 NOTE — PROGRESS NOTES
Subjective:      Patient ID: Maria E Lopez is a 48 y.o. female.    Chief Complaint: Annual Exam    HPI  Wellness     L knee pain:  Diagnosed with acute meniscal tear of L knee  S/p NSAID and also steroid injection, sx have improved  She did lab work up for RA- positive antibody  Referred to rheumatology- waiting for call back- says they are reviewing referral withDrMigel Hernandeztate   Membranous nephropathy  -nephrology following, diagnosed 2023  -currently taking Cellcept doing well no acute issues  -ACE inhibitor was added for proteinuria which has improved  HTN: stable no longer on norvasc  Hypothyroidism:  -every 3 years US for nodules  -stable with medication  -asx  Chronic Constipation:  -problem since she was 25 year old  -seen GI Dr. Elvis Donaldson [facility] 2023, samples of IBSRELA provided and she says this did help more but did not get Rx for this   -she did not tolerate amitiza, linzess did not work    HLD: on  rosuvastatin 20 mg po daily   Severe Obesity with Comorbidity, HLD: we have discussed patient weight, dietary habits and exercise- she is motivated to lose weight and change nutrition habit-she is working on dietary changes similar to [facility] system, she is bike riding for exercise  -we did try Rx for Mounjaro but not covered, she says ozempic is covered for her as an option but failed when we sent in PA and appeal    Surgery:  R knee partial knee replacement: 2021, Dr. Kingsley    Mammogram:     4/4/24 negative for malignancy  PAP smear:  local GYN placed, Dr. Webb  2/2024   Colon cancer screening: Dr. Elvis Donaldson 3/11/2022 In chart- no evidence of colon cancer- repeat in 10 yaers 3/11/2032  Flu: 11/1/2023  Prevnar 20: due next OV  COVID 19: completed 3 doses   Health Maintenance Due   Topic Date Due    Pneumococcal Vaccines (Age 0-64) (1 of 2 - PCV) Never done    COVID-19 Vaccine (3 - Moderna risk series) 01/18/2022     Review of Systems   Musculoskeletal:  Positive for arthralgias.  "      Objective:     Vitals:    04/29/24 0831   BP: 122/84   BP Location: Left arm   Patient Position: Sitting   BP Method: Large (Automatic)   Pulse: 74   Resp: 16   Temp: 98 °F (36.7 °C)   TempSrc: Temporal   SpO2: 98%   Weight: 101.7 kg (224 lb 4.8 oz)   Height: 5' 5" (1.651 m)     Physical Exam  Vitals and nursing note reviewed.   Constitutional:       General: She is not in acute distress.     Appearance: She is well-developed. She is not diaphoretic.   HENT:      Head: Normocephalic and atraumatic.      Right Ear: Tympanic membrane normal.      Left Ear: Tympanic membrane normal.      Mouth/Throat:      Pharynx: No oropharyngeal exudate.   Eyes:      General:         Right eye: No discharge.         Left eye: No discharge.      Conjunctiva/sclera: Conjunctivae normal.      Pupils: Pupils are equal, round, and reactive to light.   Neck:      Thyroid: No thyromegaly.   Cardiovascular:      Rate and Rhythm: Normal rate and regular rhythm.      Heart sounds: Normal heart sounds. No murmur heard.  Pulmonary:      Effort: Pulmonary effort is normal. No respiratory distress.      Breath sounds: Normal breath sounds. No wheezing or rales.   Abdominal:      General: There is no distension.      Palpations: Abdomen is soft.      Tenderness: There is no abdominal tenderness.   Musculoskeletal:      Cervical back: Normal range of motion and neck supple.   Lymphadenopathy:      Cervical: No cervical adenopathy.   Skin:     General: Skin is warm and dry.   Neurological:      Mental Status: She is alert and oriented to person, place, and time.   Psychiatric:         Mood and Affect: Mood normal.         Behavior: Behavior normal.       Assessment/Plan:     1. Wellness examination  Fasting labs reviewed from earlier in April  Additional ordes needed, placed today, please complete in June we will call with results  Mmg and pap smear UTD  2. Acquired hypothyroidism  -     TSH; Future; Expected date: 05/29/2024  Stable ccm  3. " Other neutropenia  -     CBC Auto Differential; Future; Expected date: 05/29/2024  WBC low, no B symptoms  Repeat in 1 month  4. Mixed hyperlipidemia  -     Lipid Panel; Future; Expected date: 05/29/2024  Stable continue low fat diet and statin therapy  5. Severe obesity (BMI 35.0-39.9) with comorbidity  BMI reviewed  Weight loss with low fat diet and also exercise advised  Patient is doing a great job with this, continue your hard work  6. Hypertension, unspecified type  Stable continue current Rx  7. Membranous glomerulonephritis  Stable no protein in urine  No longer on prednisone  F/u with nephrology  8. Prediabetes  -     Hemoglobin A1C; Future; Expected date: 04/29/2024  Stable  Check A1c  Continue healthy low fat, reduced carb diet and exercise     Follow up in about 6 months (around 10/29/2024) for Follow Up - Chronic Conditions.    This includes face to face time and non-face to face time preparing to see the patient (eg, review of tests), obtaining and/or reviewing separately obtained history, documenting clinical information in the electronic or other health record, independently interpreting results and communicating results to the patient/family/caregiver, or care coordinator.

## 2024-05-13 DIAGNOSIS — E78.5 HYPERLIPIDEMIA, UNSPECIFIED HYPERLIPIDEMIA TYPE: ICD-10-CM

## 2024-05-13 RX ORDER — ROSUVASTATIN CALCIUM 20 MG/1
20 TABLET, COATED ORAL
Qty: 90 TABLET | Refills: 0 | Status: SHIPPED | OUTPATIENT
Start: 2024-05-13

## 2024-06-10 ENCOUNTER — OFFICE VISIT (OUTPATIENT)
Dept: ORTHOPEDICS | Facility: CLINIC | Age: 49
End: 2024-06-10
Payer: COMMERCIAL

## 2024-06-10 VITALS
SYSTOLIC BLOOD PRESSURE: 133 MMHG | BODY MASS INDEX: 37.32 KG/M2 | DIASTOLIC BLOOD PRESSURE: 91 MMHG | HEART RATE: 82 BPM | HEIGHT: 65 IN | WEIGHT: 224 LBS

## 2024-06-10 DIAGNOSIS — S83.242A ACUTE MEDIAL MENISCUS TEAR OF LEFT KNEE, INITIAL ENCOUNTER: Primary | ICD-10-CM

## 2024-06-10 PROCEDURE — 3008F BODY MASS INDEX DOCD: CPT | Mod: CPTII,,, | Performed by: SPECIALIST

## 2024-06-10 PROCEDURE — 1159F MED LIST DOCD IN RCRD: CPT | Mod: CPTII,,, | Performed by: SPECIALIST

## 2024-06-10 PROCEDURE — 3080F DIAST BP >= 90 MM HG: CPT | Mod: CPTII,,, | Performed by: SPECIALIST

## 2024-06-10 PROCEDURE — 4010F ACE/ARB THERAPY RXD/TAKEN: CPT | Mod: CPTII,,, | Performed by: SPECIALIST

## 2024-06-10 PROCEDURE — 3066F NEPHROPATHY DOC TX: CPT | Mod: CPTII,,, | Performed by: SPECIALIST

## 2024-06-10 PROCEDURE — 3075F SYST BP GE 130 - 139MM HG: CPT | Mod: CPTII,,, | Performed by: SPECIALIST

## 2024-06-10 PROCEDURE — 99213 OFFICE O/P EST LOW 20 MIN: CPT | Mod: ,,, | Performed by: SPECIALIST

## 2024-06-10 NOTE — PROGRESS NOTES
Past Medical History:   Diagnosis Date    Chronic constipation     Hashimoto's thyroiditis     Hyperlipidemia     Hypertension     Hypothyroidism     Obesity     Osteoarthritis     Prediabetes        Past Surgical History:   Procedure Laterality Date    ARTHROPLASTY  10/26/2021    BIOPSY OF LESION      JOINT REPLACEMENT  Right Knee    2021    replacement of rt knee joint Right 10/26/2021    rt knee arthroscopy         Current Outpatient Medications   Medication Sig    acetaminophen (TYLENOL) 500 MG tablet Take 500 mg by mouth every 6 (six) hours as needed for Pain.    IBSRELA 50 mg Tab Take 50 tablets by mouth 2 (two) times a day.    levonorgestreL (LILETTA) 20.1 mcg/24 hrs (6 yrs) 52 mg IUD 52 mg by Intrauterine route.    levothyroxine (SYNTHROID) 100 MCG tablet Take 1 tablet (100 mcg total) by mouth once daily.    liothyronine (CYTOMEL) 5 MCG Tab Take 1 tablet (5 mcg total) by mouth once daily.    losartan (COZAAR) 50 MG tablet Take 1 tablet (50 mg total) by mouth once daily.    omeprazole (PRILOSEC) 20 MG capsule Take by mouth Daily.    rosuvastatin (CRESTOR) 20 MG tablet Take 1 tablet by mouth once daily    turmeric root extract 500 mg Cap Take by mouth.    mycophenolate (CELLCEPT) 500 mg Tab Take 1 tablet (500 mg total) by mouth 2 (two) times daily.     No current facility-administered medications for this visit.       Review of patient's allergies indicates:  No Known Allergies    Family History   Problem Relation Name Age of Onset    Thyroid disease Mother Nohemy GaoMaria G     Diabetes Mother Nohemy Pena     Hypertension Mother Nohemy Pena     Arthritis Mother Nohemy Pena     Liver cancer Father Santiago Pena     Heart disease Father Santiago Pena     COPD Father Santiago Pena     Alcohol abuse Father Santiago Pena     Heart disease Paternal Grandfather Leonard [place]     Cancer Paternal Grandmother [facility]     Stroke Maternal Grandfather Hood Galeana        Social History      Socioeconomic History    Marital status:    Tobacco Use    Smoking status: Never     Passive exposure: Never    Smokeless tobacco: Never   Substance and Sexual Activity    Alcohol use: Never    Drug use: Never    Sexual activity: Not Currently     Partners: Male     Birth control/protection: I.U.D., None   Social History Narrative    The patient is  and has 3 children.  She works as a .     Social Determinants of Health     Financial Resource Strain: Medium Risk (4/8/2024)    Overall Financial Resource Strain (CARDIA)     Difficulty of Paying Living Expenses: Somewhat hard   Food Insecurity: No Food Insecurity (4/8/2024)    Hunger Vital Sign     Worried About Running Out of Food in the Last Year: Never true     Ran Out of Food in the Last Year: Never true   Transportation Needs: No Transportation Needs (4/8/2024)    PRAPARE - Transportation     Lack of Transportation (Medical): No     Lack of Transportation (Non-Medical): No   Physical Activity: Sufficiently Active (4/8/2024)    Exercise Vital Sign     Days of Exercise per Week: 3 days     Minutes of Exercise per Session: 60 min   Stress: No Stress Concern Present (4/8/2024)    [employer] - Occupational Stress Questionnaire     Feeling of Stress : Only a little   Housing Stability: Unknown (4/8/2024)    Housing Stability Vital Sign     Unable to Pay for Housing in the Last Year: No     Unstable Housing in the Last Year: No       Chief Complaint:   Chief Complaint   Patient presents with    Left Knee - Follow-up     Pt states the injection helped for a couple of days. Pt states the pain is very constant, the exercises aggravates the pain and she couldn't get in PT. Pt states        Consulting Physician: No ref. provider found    History of present illness:    This is a 48 y.o. year old female who complains of continued pain and locking in the left knee.  The pain is medial and has occurred since she  "injured herself while exercising back in April.  The injection helped 2-3 days but the pain has come back and remained constant over the past 6 weeks.  Exercise and therapy has not helped.    Review of Systems:    Constitution:   Denies chills, fever, and sweats.  HENT:   Denies headaches or blurry vision.  Cardiovascular:  Denies chest pain or irregular heart beat.  Respiratory:   Denies cough or shortness of breath.  Gastrointestinal:  Denies abdominal pain, nausea, or vomiting.  Musculoskeletal:   Denies muscle cramps.  Neurological:   Denies dizziness or focal weakness.  Psychiatric/Behavior: Normal mental status.  Hematology/Lymph:  Denies bleeding problem or easy bruising/bleeding.  Skin:    Denies rash or suspicious lesions.    Examination:    Vital Signs:    Vitals:    06/10/24 1247 06/10/24 1249   BP:  (!) 133/91   Pulse:  82   Weight: 101.6 kg (224 lb) 101.6 kg (224 lb)   Height: 5' 5" (1.651 m) 5' 5" (1.651 m)   PainSc:    5       Body mass index is 37.28 kg/m².    Constitution:   Well-developed, well nourished patient in no acute distress.  Neurological:   Alert and oriented x 3 and cooperative to examination.     Psychiatric/Behavior: Normal mental status.  Respiratory:   No shortness of breath.non labored breathing.  Cardiovascular: Regular rate and rhythm  Eyes:    Extraoccular muscles intact  Skin:    No scars, rash or suspicious lesions.    Physical Exam:     General Musculoskeletal Exam   Gait: antalgic         Left Knee Exam     Inspection   Erythema: absent  Effusion: present  Deformity: present  Bruising: absent    Tenderness   The patient tender to palpation of the condyle, MCL, medial retinaculum and medial joint line.    Crepitus   The patient has crepitus of the medial joint line.    Range of Motion   Extension: abnormal   Flexion: abnormal   0° to 135°   No pathologic laxity of the cruciate or collateral ligaments   Positive medial Jayce's test for pain    Tests   Meniscus   Jayce:  " Medial - positive Lateral - negative  Stability   MCL - Valgus: normal (0 to 2mm)  LCL - Varus: normal (0 to 2mm)  Patella   Passive Patellar Tilt: neutral    Other   Sensation: normal    Comments:  Varus deformity    Muscle Strength   Left Lower Extremity   Quadriceps:  5/5   Hamstrin/5     Vascular Exam       Left Pulses  Dorsalis Pedis:      2+  Posterior Tibial:      2+          Imaging: X-rays reviewed from 2024 that show no evidence of fracture or dislocation.  Normal appearing cartilage spaces.  No loose bodies.         Assessment: Acute medial meniscus tear of left knee, initial encounter        Plan:  MRI of the left knee to evaluate the integrity of the medial meniscus due to suspected tear and follow up in a couple of weeks to go over the results and discuss next steps.      DISCLAIMER: This note may have been dictated using voice recognition software and may contain grammatical errors.     NOTE: Consult report sent to referring provider via [employer].

## 2024-06-11 ENCOUNTER — LAB VISIT (OUTPATIENT)
Dept: LAB | Facility: HOSPITAL | Age: 49
End: 2024-06-11
Attending: INTERNAL MEDICINE
Payer: COMMERCIAL

## 2024-06-11 DIAGNOSIS — E03.9 ACQUIRED HYPOTHYROIDISM: ICD-10-CM

## 2024-06-11 DIAGNOSIS — E78.2 MIXED HYPERLIPIDEMIA: ICD-10-CM

## 2024-06-11 DIAGNOSIS — N02.2 MEMBRANOUS NEPHROPATHY DETERMINED BY BIOPSY: ICD-10-CM

## 2024-06-11 DIAGNOSIS — E03.9 ACQUIRED HYPOTHYROIDISM: Primary | ICD-10-CM

## 2024-06-11 DIAGNOSIS — R73.03 PREDIABETES: ICD-10-CM

## 2024-06-11 LAB
ALBUMIN SERPL-MCNC: 3.9 G/DL (ref 3.5–5)
ALBUMIN/GLOB SERPL: 1.2 RATIO (ref 1.1–2)
ALP SERPL-CCNC: 64 UNIT/L (ref 40–150)
ALT SERPL-CCNC: 26 UNIT/L (ref 0–55)
ANION GAP SERPL CALC-SCNC: 8 MEQ/L
AST SERPL-CCNC: 19 UNIT/L (ref 5–34)
BACTERIA #/AREA URNS AUTO: ABNORMAL /HPF
BASOPHILS # BLD AUTO: 0.05 X10(3)/MCL
BASOPHILS NFR BLD AUTO: 0.8 %
BILIRUB SERPL-MCNC: 1.3 MG/DL
BILIRUB UR QL STRIP.AUTO: NEGATIVE
BUN SERPL-MCNC: 7.3 MG/DL (ref 7–18.7)
CALCIUM SERPL-MCNC: 9.5 MG/DL (ref 8.4–10.2)
CHLORIDE SERPL-SCNC: 105 MMOL/L (ref 98–107)
CHOLEST SERPL-MCNC: 147 MG/DL
CHOLEST/HDLC SERPL: 3 {RATIO} (ref 0–5)
CLARITY UR: CLEAR
CO2 SERPL-SCNC: 25 MMOL/L (ref 22–29)
COLOR UR AUTO: ABNORMAL
CREAT SERPL-MCNC: 0.77 MG/DL (ref 0.55–1.02)
CREAT UR-MCNC: 53.4 MG/DL (ref 45–106)
CREAT/UREA NIT SERPL: 9
EOSINOPHIL # BLD AUTO: 0.2 X10(3)/MCL (ref 0–0.9)
EOSINOPHIL NFR BLD AUTO: 3.4 %
ERYTHROCYTE [DISTWIDTH] IN BLOOD BY AUTOMATED COUNT: 13.9 % (ref 11.5–17)
EST. AVERAGE GLUCOSE BLD GHB EST-MCNC: 125.5 MG/DL
GFR SERPLBLD CREATININE-BSD FMLA CKD-EPI: >60 ML/MIN/1.73/M2
GLOBULIN SER-MCNC: 3.2 GM/DL (ref 2.4–3.5)
GLUCOSE SERPL-MCNC: 102 MG/DL (ref 74–100)
GLUCOSE UR QL STRIP: NEGATIVE
HBA1C MFR BLD: 6 %
HCT VFR BLD AUTO: 39.3 % (ref 37–47)
HDLC SERPL-MCNC: 56 MG/DL (ref 35–60)
HGB BLD-MCNC: 12.6 G/DL (ref 12–16)
HGB UR QL STRIP: ABNORMAL
IMM GRANULOCYTES # BLD AUTO: 0.02 X10(3)/MCL (ref 0–0.04)
IMM GRANULOCYTES NFR BLD AUTO: 0.3 %
KETONES UR QL STRIP: NEGATIVE
LDLC SERPL CALC-MCNC: 78 MG/DL (ref 50–140)
LEUKOCYTE ESTERASE UR QL STRIP: NEGATIVE
LYMPHOCYTES # BLD AUTO: 2.99 X10(3)/MCL (ref 0.6–4.6)
LYMPHOCYTES NFR BLD AUTO: 50.6 %
MCH RBC QN AUTO: 28.4 PG (ref 27–31)
MCHC RBC AUTO-ENTMCNC: 32.1 G/DL (ref 33–36)
MCV RBC AUTO: 88.7 FL (ref 80–94)
MONOCYTES # BLD AUTO: 0.41 X10(3)/MCL (ref 0.1–1.3)
MONOCYTES NFR BLD AUTO: 6.9 %
NEUTROPHILS # BLD AUTO: 2.24 X10(3)/MCL (ref 2.1–9.2)
NEUTROPHILS NFR BLD AUTO: 38 %
NITRITE UR QL STRIP: NEGATIVE
NRBC BLD AUTO-RTO: 0 %
PH UR STRIP: 6 [PH]
PLATELET # BLD AUTO: 306 X10(3)/MCL (ref 130–400)
PMV BLD AUTO: 10 FL (ref 7.4–10.4)
POTASSIUM SERPL-SCNC: 4.1 MMOL/L (ref 3.5–5.1)
PROT SERPL-MCNC: 7.1 GM/DL (ref 6.4–8.3)
PROT UR QL STRIP: NEGATIVE
PROT UR STRIP-MCNC: <6.8 MG/DL
RBC # BLD AUTO: 4.43 X10(6)/MCL (ref 4.2–5.4)
RBC #/AREA URNS AUTO: ABNORMAL /HPF
SODIUM SERPL-SCNC: 138 MMOL/L (ref 136–145)
SP GR UR STRIP.AUTO: 1.01 (ref 1–1.03)
SQUAMOUS #/AREA URNS AUTO: ABNORMAL /HPF
TRIGL SERPL-MCNC: 65 MG/DL (ref 37–140)
TSH SERPL-ACNC: 0.26 UIU/ML (ref 0.35–4.94)
UROBILINOGEN UR STRIP-ACNC: 0.2
VLDLC SERPL CALC-MCNC: 13 MG/DL
WBC # SPEC AUTO: 5.91 X10(3)/MCL (ref 4.5–11.5)
WBC #/AREA URNS AUTO: ABNORMAL /HPF

## 2024-06-11 PROCEDURE — 85025 COMPLETE CBC W/AUTO DIFF WBC: CPT

## 2024-06-11 PROCEDURE — 81003 URINALYSIS AUTO W/O SCOPE: CPT

## 2024-06-11 PROCEDURE — 36415 COLL VENOUS BLD VENIPUNCTURE: CPT

## 2024-06-11 PROCEDURE — 84443 ASSAY THYROID STIM HORMONE: CPT

## 2024-06-11 PROCEDURE — 80061 LIPID PANEL: CPT

## 2024-06-11 PROCEDURE — 80053 COMPREHEN METABOLIC PANEL: CPT

## 2024-06-11 PROCEDURE — 84156 ASSAY OF PROTEIN URINE: CPT

## 2024-06-11 PROCEDURE — 83036 HEMOGLOBIN GLYCOSYLATED A1C: CPT

## 2024-06-18 ENCOUNTER — PATIENT MESSAGE (OUTPATIENT)
Dept: ORTHOPEDICS | Facility: CLINIC | Age: 49
End: 2024-06-18
Payer: COMMERCIAL

## 2024-06-19 ENCOUNTER — OFFICE VISIT (OUTPATIENT)
Dept: NEPHROLOGY | Facility: CLINIC | Age: 49
End: 2024-06-19
Payer: COMMERCIAL

## 2024-06-19 VITALS
BODY MASS INDEX: 36.43 KG/M2 | OXYGEN SATURATION: 99 % | WEIGHT: 218.63 LBS | DIASTOLIC BLOOD PRESSURE: 74 MMHG | RESPIRATION RATE: 20 BRPM | HEIGHT: 65 IN | HEART RATE: 74 BPM | TEMPERATURE: 98 F | SYSTOLIC BLOOD PRESSURE: 118 MMHG

## 2024-06-19 DIAGNOSIS — R80.1 PERSISTENT PROTEINURIA: ICD-10-CM

## 2024-06-19 DIAGNOSIS — I10 PRIMARY HYPERTENSION: ICD-10-CM

## 2024-06-19 DIAGNOSIS — N02.2 MEMBRANOUS NEPHROPATHY DETERMINED BY BIOPSY: Primary | ICD-10-CM

## 2024-06-19 PROCEDURE — 99999 PR PBB SHADOW E&M-EST. PATIENT-LVL IV: CPT | Mod: PBBFAC,,,

## 2024-06-19 PROCEDURE — 4010F ACE/ARB THERAPY RXD/TAKEN: CPT | Mod: CPTII,S$GLB,,

## 2024-06-19 PROCEDURE — 3066F NEPHROPATHY DOC TX: CPT | Mod: CPTII,S$GLB,,

## 2024-06-19 PROCEDURE — 3008F BODY MASS INDEX DOCD: CPT | Mod: CPTII,S$GLB,,

## 2024-06-19 PROCEDURE — 3044F HG A1C LEVEL LT 7.0%: CPT | Mod: CPTII,S$GLB,,

## 2024-06-19 PROCEDURE — 1159F MED LIST DOCD IN RCRD: CPT | Mod: CPTII,S$GLB,,

## 2024-06-19 PROCEDURE — 99213 OFFICE O/P EST LOW 20 MIN: CPT | Mod: S$GLB,,,

## 2024-06-19 PROCEDURE — 3078F DIAST BP <80 MM HG: CPT | Mod: CPTII,S$GLB,,

## 2024-06-19 PROCEDURE — 3074F SYST BP LT 130 MM HG: CPT | Mod: CPTII,S$GLB,,

## 2024-06-19 RX ORDER — LEFLUNOMIDE 10 MG/1
TABLET ORAL
COMMUNITY
Start: 2024-06-03

## 2024-06-19 RX ORDER — MYCOPHENOLATE MOFETIL 250 MG/1
250 CAPSULE ORAL 2 TIMES DAILY
Qty: 60 CAPSULE | Refills: 11 | Status: SHIPPED | OUTPATIENT
Start: 2024-06-19 | End: 2025-06-19

## 2024-06-19 NOTE — PATIENT INSTRUCTIONS
Labs and urine in 4 weeks  Labs and urine in 8 weeks    Labs and urine in 12 weeks and then follow up    Decrease CellCept to 250 mg twice daily; new prescription sent

## 2024-06-19 NOTE — PROGRESS NOTES
[facility]phrology Ambulatory Progress Note      HPI  Maria E Lopez, 48 y.o. female, presents to office for a follow up visit for membranous nephropathy.  Patient has responded nicely to steroids and CellCept.  Last visit we discontinue steroids.  She continues to show no proteinuria and stable renal function.  She has been on CellCept 500 b.i.d.    For complaint of left knee pain.  MRI shows meniscus tear.  Plans for surgery in 1 month.    She is seeing Dr. Riggins, Rheumatology for rheumatoid arthritis; recently started on leflunomide; they will be monitoring her liver function    Patient denies taking NSAIDs or new antibiotics. Also denies recent episode of dehydration, diarrhea, vomiting, acute illness, hospitalization, recent angiograms or exposure to IV radiocontrast.        Medical Diagnoses:   Past Medical History:   Diagnosis Date    Chronic constipation     Hashimoto's thyroiditis     Hyperlipidemia     Hypertension     Hypothyroidism     Obesity     Osteoarthritis     Prediabetes      Patient Active Problem List   Diagnosis    Acquired hypothyroidism    Chronic constipation    Severe obesity (BMI 35.0-39.9) with comorbidity    Prediabetes    Microscopic hematuria    Osteoarthritis of right knee    Hypertension    Hyperlipidemia    Persistent proteinuria    Drug-induced immunodeficiency    Autoimmune disease    Membranous glomerulonephritis    Membranous nephropathy determined by biopsy       Surgical History:   Past Surgical History:   Procedure Laterality Date    ARTHROPLASTY  10/26/2021    BIOPSY OF LESION      JOINT REPLACEMENT  Right Knee    2021    replacement of rt knee joint Right 10/26/2021    rt knee arthroscopy         Family History:   Family History   Problem Relation Name Age of Onset    Thyroid disease Mother Nohemy Pena     Diabetes Mother Nohemy Pena     Hypertension Mother Nohemy Pena     Arthritis Mother Nohemy Pena     Liver cancer Father Santiago Pena      Heart disease Father Santiago Pena     COPD Father Santiago Pena     Alcohol abuse Father Santiago Pena     Heart disease Paternal Grandfather Leonard Nichols     Cancer Paternal Grandmother Jolene Nichols     Stroke Maternal Grandfather Hood Galeana        Social History:   Social History     Tobacco Use    Smoking status: Never     Passive exposure: Never    Smokeless tobacco: Never   Substance Use Topics    Alcohol use: Never       Allergies:  Review of patient's allergies indicates:  No Known Allergies    Medications:    Current Outpatient Medications:     acetaminophen (TYLENOL) 500 MG tablet, Take 500 mg by mouth every 6 (six) hours as needed for Pain., Disp: , Rfl:     IBSRELA 50 mg Tab, Take 50 tablets by mouth 2 (two) times a day., Disp: , Rfl:     leflunomide (ARAVA) 10 MG Tab, , Disp: , Rfl:     levonorgestreL (LILETTA) 20.1 mcg/24 hrs (6 yrs) 52 mg IUD, 52 mg by Intrauterine route., Disp: , Rfl:     levothyroxine (SYNTHROID) 100 MCG tablet, Take 1 tablet (100 mcg total) by mouth once daily., Disp: 90 tablet, Rfl: 3    liothyronine (CYTOMEL) 5 MCG Tab, Take 1 tablet (5 mcg total) by mouth once daily., Disp: 90 tablet, Rfl: 3    losartan (COZAAR) 50 MG tablet, Take 1 tablet (50 mg total) by mouth once daily., Disp: 30 tablet, Rfl: 11    mycophenolate (CELLCEPT) 500 mg Tab, Take 1 tablet (500 mg total) by mouth 2 (two) times daily., Disp: 60 tablet, Rfl: 1    rosuvastatin (CRESTOR) 20 MG tablet, Take 1 tablet by mouth once daily, Disp: 90 tablet, Rfl: 0    turmeric root extract 500 mg Cap, Take by mouth., Disp: , Rfl:     omeprazole (PRILOSEC) 20 MG capsule, Take by mouth Daily. (Patient not taking: Reported on 6/19/2024), Disp: , Rfl:        Review of Systems:    Constitutional: Denies fever, fatigue, generalized weakness  Skin: Denies wounds, no rashes, no itching, no new skin lesions  Respiratory:  Denies cough, shortness of breath, or wheezing  Cardiovascular: Denies chest pain, palpitations, or  "swelling  Gastrointestional: Denies abdominal pain, nausea, vomiting, diarrhea, or constipation  Genitourinary: Denies dysuria, hematuria, foamy urine, or incontinence; reports able to empty bladder  Musculoskeletal: Denies back or flank pain  Neurological: Denies headaches, dizziness, paresthesias, tremors or focal weakness      Vital Signs:  /74 (BP Location: Right arm, Patient Position: Sitting, BP Method: Medium (Manual))   Pulse 74   Temp 97.5 °F (36.4 °C) (Temporal)   Resp 20   Ht 5' 5" (1.651 m)   Wt 99.2 kg (218 lb 9.6 oz)   SpO2 99%   BMI 36.38 kg/m²   Body mass index is 36.38 kg/m².      Physical Exam:    General: no acute distress, awake, alert  Eyes: conjunctiva clear, eyelids without swelling  HENT: atraumatic, oropharynx and nasal mucosa patent  Neck: supple, trache midline, no JVD  Respiratory: equal, unlabored, clear to auscultation A/P  Cardiovascular: RRR without murmur or rub  Edema: none  Gastrointestinal: soft, non-tender, non-distended  Musculoskeletal:  Left knee pain  Integumentary: warm, dry; no rashes, wounds, or skin lesions  Neurological: oriented x4, appropriate, no acute deficits; no asterixis      Labs:        Component Value Date/Time     06/11/2024 0849     04/08/2024 1303     09/20/2016 0805     06/16/2015 1016    K 4.1 06/11/2024 0849    K 3.8 04/08/2024 1303    K 4.3 09/20/2016 0805    K 3.9 06/16/2015 1016     06/11/2024 0849     (H) 04/08/2024 1303     09/20/2016 0805     06/16/2015 1016    CO2 25 06/11/2024 0849    CO2 25 04/08/2024 1303    CO2 25 09/20/2016 0805    CO2 24 06/16/2015 1016    BUN 7.3 06/11/2024 0849    BUN 9.6 04/08/2024 1303    BUN 7 09/20/2016 0805    BUN 6 06/16/2015 1016    CREATININE 0.77 06/11/2024 0849    CREATININE 0.74 04/08/2024 1303    CREATININE 0.83 03/07/2024 0825    CREATININE 0.82 01/24/2024 1014    CREATININE 1.0 09/20/2016 0805    CREATININE 0.9 06/16/2015 1016    CREATININE 0.9 " 11/28/2012 0805    CREATININE 0.7 07/22/2010 0805    CALCIUM 9.5 06/11/2024 0849    CALCIUM 9.3 04/08/2024 1303    CALCIUM 9.0 09/20/2016 0805    CALCIUM 9.2 06/16/2015 1016    PHOS 3.7 12/12/2023 1440    PHOS 4.4 07/06/2023 0814           Component Value Date/Time    WBC 5.91 06/11/2024 0849    WBC 3.62 (L) 04/08/2024 1303    WBC 4.50 09/20/2016 0805    WBC 4.54 06/16/2015 1016    HGB 12.6 06/11/2024 0849    HGB 12.5 04/08/2024 1303    HGB 12.6 09/20/2016 0805    HGB 13.0 06/16/2015 1016    HCT 39.3 06/11/2024 0849    HCT 39.7 04/08/2024 1303    HCT 39.9 09/20/2016 0805    HCT 39.2 06/16/2015 1016     06/11/2024 0849     04/08/2024 1303     09/20/2016 0805     06/16/2015 1016       Urine Creatinine   Date Value Ref Range Status   06/11/2024 53.4 45.0 - 106.0 mg/dL Final   04/08/2024 108.8 (H) 45.0 - 106.0 mg/dL Final       Urine Protein Level   Date Value Ref Range Status   06/11/2024 <6.8 mg/dL Final   04/08/2024 <6.8 mg/dL Final         Impression:    1. Membranous nephropathy determined by biopsy  Comprehensive Metabolic Panel    CBC Auto Differential    Protein/Creatinine Ratio, Urine      2. Primary hypertension        3. Persistent proteinuria  Comprehensive Metabolic Panel    CBC Auto Differential    Protein/Creatinine Ratio, Urine      No evidence of proteinuria or reactivation of the disease    Patient to have meniscus repair of her left knee in 1 month.  Discussed risk with patient's regarding potential complication of infection while being on immunosuppressant therapy    Plan:  Decrease CellCept to 250 mg b.i.d.   Labs and UPCR every 4 weeks for the next 3 months; if all is stable we will see her in 3 months with a goal of discontinuing CellCept at that time      Dinesh RESTREPO      This note was created with the assistance of [employer] voice recognition software or phone dictation. There may be transcription errors as a result of using this technology however minimal.  Effort has been made to assure accuracy of transcription but any obvious errors or omissions should be clarified with the author of the document.

## 2024-07-08 ENCOUNTER — CLINICAL SUPPORT (OUTPATIENT)
Dept: LAB | Facility: HOSPITAL | Age: 49
End: 2024-07-08
Attending: PHYSICIAN ASSISTANT
Payer: COMMERCIAL

## 2024-07-08 ENCOUNTER — OFFICE VISIT (OUTPATIENT)
Dept: ORTHOPEDICS | Facility: CLINIC | Age: 49
End: 2024-07-08
Payer: COMMERCIAL

## 2024-07-08 VITALS
SYSTOLIC BLOOD PRESSURE: 154 MMHG | BODY MASS INDEX: 36.99 KG/M2 | HEIGHT: 65 IN | WEIGHT: 222 LBS | DIASTOLIC BLOOD PRESSURE: 99 MMHG | HEART RATE: 64 BPM

## 2024-07-08 DIAGNOSIS — S83.242D ACUTE MEDIAL MENISCUS TEAR OF LEFT KNEE, SUBSEQUENT ENCOUNTER: ICD-10-CM

## 2024-07-08 DIAGNOSIS — S83.242D ACUTE MEDIAL MENISCUS TEAR OF LEFT KNEE, SUBSEQUENT ENCOUNTER: Primary | ICD-10-CM

## 2024-07-08 LAB
OHS QRS DURATION: 88 MS
OHS QTC CALCULATION: 407 MS

## 2024-07-08 PROCEDURE — 99214 OFFICE O/P EST MOD 30 MIN: CPT | Mod: ,,, | Performed by: SPECIALIST

## 2024-07-08 PROCEDURE — 93005 ELECTROCARDIOGRAM TRACING: CPT

## 2024-07-08 PROCEDURE — 3008F BODY MASS INDEX DOCD: CPT | Mod: CPTII,,, | Performed by: SPECIALIST

## 2024-07-08 PROCEDURE — 1160F RVW MEDS BY RX/DR IN RCRD: CPT | Mod: CPTII,,, | Performed by: SPECIALIST

## 2024-07-08 PROCEDURE — 4010F ACE/ARB THERAPY RXD/TAKEN: CPT | Mod: CPTII,,, | Performed by: SPECIALIST

## 2024-07-08 PROCEDURE — 93010 ELECTROCARDIOGRAM REPORT: CPT | Mod: ,,, | Performed by: INTERNAL MEDICINE

## 2024-07-08 PROCEDURE — 1159F MED LIST DOCD IN RCRD: CPT | Mod: CPTII,,, | Performed by: SPECIALIST

## 2024-07-08 PROCEDURE — 3066F NEPHROPATHY DOC TX: CPT | Mod: CPTII,,, | Performed by: SPECIALIST

## 2024-07-08 PROCEDURE — 3080F DIAST BP >= 90 MM HG: CPT | Mod: CPTII,,, | Performed by: SPECIALIST

## 2024-07-08 PROCEDURE — 3077F SYST BP >= 140 MM HG: CPT | Mod: CPTII,,, | Performed by: SPECIALIST

## 2024-07-08 PROCEDURE — 3044F HG A1C LEVEL LT 7.0%: CPT | Mod: CPTII,,, | Performed by: SPECIALIST

## 2024-07-08 RX ORDER — SCOLOPAMINE TRANSDERMAL SYSTEM 1 MG/1
1 PATCH, EXTENDED RELEASE TRANSDERMAL ONCE AS NEEDED
OUTPATIENT
Start: 2024-07-08 | End: 2035-12-05

## 2024-07-08 RX ORDER — GABAPENTIN 100 MG/1
300 CAPSULE ORAL
OUTPATIENT
Start: 2024-07-08

## 2024-07-08 RX ORDER — ACETAMINOPHEN 500 MG
1000 TABLET ORAL
OUTPATIENT
Start: 2024-07-08

## 2024-07-08 RX ORDER — SODIUM CHLORIDE, SODIUM GLUCONATE, SODIUM ACETATE, POTASSIUM CHLORIDE AND MAGNESIUM CHLORIDE 30; 37; 368; 526; 502 MG/100ML; MG/100ML; MG/100ML; MG/100ML; MG/100ML
INJECTION, SOLUTION INTRAVENOUS CONTINUOUS
OUTPATIENT
Start: 2024-07-08

## 2024-07-08 RX ORDER — KETOROLAC TROMETHAMINE 10 MG/1
10 TABLET, FILM COATED ORAL ONCE
OUTPATIENT
Start: 2024-07-08 | End: 2024-07-13

## 2024-07-08 NOTE — H&P
Past Medical History:   Diagnosis Date    Chronic constipation     Hashimoto's thyroiditis     Hyperlipidemia     Hypertension     Hypothyroidism     Obesity     Osteoarthritis     Prediabetes        Past Surgical History:   Procedure Laterality Date    ARTHROPLASTY  10/26/2021    BIOPSY OF LESION      JOINT REPLACEMENT  Right Knee    2021    replacement of rt knee joint Right 10/26/2021    rt knee arthroscopy         Current Outpatient Medications   Medication Sig    acetaminophen (TYLENOL) 500 MG tablet Take 500 mg by mouth every 6 (six) hours as needed for Pain.    IBSRELA 50 mg Tab Take 50 tablets by mouth 2 (two) times a day.    levonorgestreL (LILETTA) 20.1 mcg/24 hrs (6 yrs) 52 mg IUD 52 mg by Intrauterine route.    levothyroxine (SYNTHROID) 100 MCG tablet Take 1 tablet (100 mcg total) by mouth once daily.    liothyronine (CYTOMEL) 5 MCG Tab Take 1 tablet (5 mcg total) by mouth once daily.    losartan (COZAAR) 50 MG tablet Take 1 tablet (50 mg total) by mouth once daily.    mycophenolate (CELLCEPT) 250 mg Cap Take 1 capsule (250 mg total) by mouth 2 (two) times daily.    rosuvastatin (CRESTOR) 20 MG tablet Take 1 tablet by mouth once daily    turmeric root extract 500 mg Cap Take by mouth.    leflunomide (ARAVA) 10 MG Tab  (Patient not taking: Reported on 7/8/2024)    omeprazole (PRILOSEC) 20 MG capsule Take by mouth Daily. (Patient not taking: Reported on 6/19/2024)     No current facility-administered medications for this visit.       Review of patient's allergies indicates:  No Known Allergies    Family History   Problem Relation Name Age of Onset    Thyroid disease Mother Nohemy Pena     Diabetes Mother Nohemy Pena     Hypertension Mother Nohemy Pena     Arthritis Mother Nohemy Pena     Liver cancer Father Santiago Pena     Heart disease Father Santiago Pena     COPD Father Santiago Pena     Alcohol abuse Father Santiago Pena     Heart disease Paternal Grandfather Colier  [place]     Cancer Paternal Grandmother Jolene Nichols     Stroke Maternal Grandfather Hood Galeana        Social History     Socioeconomic History    Marital status:    Tobacco Use    Smoking status: Never     Passive exposure: Never    Smokeless tobacco: Never   Substance and Sexual Activity    Alcohol use: Never    Drug use: Never    Sexual activity: Not Currently     Partners: Male     Birth control/protection: I.U.D., None   Social History Narrative    The patient is  and has 3 children.  She works as a .     Social Determinants of Health     Financial Resource Strain: Medium Risk (4/8/2024)    Overall Financial Resource Strain (CARDIA)     Difficulty of Paying Living Expenses: Somewhat hard   Food Insecurity: No Food Insecurity (4/8/2024)    Hunger Vital Sign     Worried About Running Out of Food in the Last Year: Never true     Ran Out of Food in the Last Year: Never true   Transportation Needs: No Transportation Needs (4/8/2024)    PRAPARE - Transportation     Lack of Transportation (Medical): No     Lack of Transportation (Non-Medical): No   Physical Activity: Sufficiently Active (4/8/2024)    Exercise Vital Sign     Days of Exercise per Week: 3 days     Minutes of Exercise per Session: 60 min   Stress: No Stress Concern Present (4/8/2024)    [employer] - Occupational Stress Questionnaire     Feeling of Stress : Only a little   Housing Stability: Unknown (4/8/2024)    Housing Stability Vital Sign     Unable to Pay for Housing in the Last Year: No     Unstable Housing in the Last Year: No       Chief Complaint:   Chief Complaint   Patient presents with    Left Knee - Follow-up     Pt is present to discuss the findings in the MRI.        Consulting Physician: No ref. provider found    History of present illness:    This is a 48 y.o. year old female who presents today for MRI test results of the left knee.  No interval change since last exam.  She  "continues to have pain..  The pain is medial and has occurred since she injured herself while exercising back in April.  The injection helped 2-3 days but the pain has come back and remained constant over the past 6 weeks.  Exercise and therapy has not helped.    Review of Systems:    Constitution:   Denies chills, fever, and sweats.  HENT:   Denies headaches or blurry vision.  Cardiovascular:  Denies chest pain or irregular heart beat.  Respiratory:   Denies cough or shortness of breath.  Gastrointestinal:  Denies abdominal pain, nausea, or vomiting.  Musculoskeletal:   Denies muscle cramps.  Neurological:   Denies dizziness or focal weakness.  Psychiatric/Behavior: Normal mental status.  Hematology/Lymph:  Denies bleeding problem or easy bruising/bleeding.  Skin:    Denies rash or suspicious lesions.    Examination:    Vital Signs:    Vitals:    07/08/24 1333 07/08/24 1335   BP: (!) 154/99    Pulse: 64    Weight: 100.7 kg (222 lb)    Height: 5' 5" (1.651 m)    PainSc:    5       Body mass index is 36.94 kg/m².    Constitution:   Well-developed, well nourished patient in no acute distress.  Neurological:   Alert and oriented x 3 and cooperative to examination.     Psychiatric/Behavior: Normal mental status.  Respiratory:   No shortness of breath.non labored breathing.  Cardiovascular: Regular rate and rhythm  Eyes:    Extraoccular muscles intact  Skin:    No scars, rash or suspicious lesions.    Physical Exam:     General Musculoskeletal Exam   Gait: antalgic         Left Knee Exam     Inspection   Erythema: absent  Effusion: present  Deformity: present  Bruising: absent    Tenderness   The patient tender to palpation of the condyle, MCL, medial retinaculum and medial joint line.    Crepitus   The patient has crepitus of the medial joint line.    Range of Motion   Extension: abnormal   Flexion: abnormal   0° to 135°   No pathologic laxity of the cruciate or collateral ligaments   Positive medial Jayce's test for " pain    Tests   Meniscus   Jayce:  Medial - positive Lateral - negative  Stability   MCL - Valgus: normal (0 to 2mm)  LCL - Varus: normal (0 to 2mm)  Patella   Passive Patellar Tilt: neutral    Other   Sensation: normal    Comments:  Varus deformity    Muscle Strength   Left Lower Extremity   Quadriceps:  5/5   Hamstrin/5     Vascular Exam       Left Pulses  Dorsalis Pedis:      2+  Posterior Tibial:      2+          Imaging: X-rays reviewed from 2024 that show no evidence of fracture or dislocation.  Normal appearing cartilage spaces.  No loose bodies.        MRI of the left knee reviewed showing near full-thickness radial tear through the posterior horn of the medial meniscus which is partially extruded medially   Moderate degenerative arthrosis medial compartment   Mild grade 1 sprain of the MCL     Assessment: Acute medial meniscus tear of left knee, subsequent encounter        Plan:   Arthroscopic left knee partial medial meniscectomy    The proposed procedure as well as associated risks/benefits were discussed at length with the patient and family with risks to include pain, bleeding, infection, future surgeries, neurovascular compromise, loss of limb, heart attack, stroke, deep vein thrombosis, and even death. They understand and agree with the treatment plan.        DISCLAIMER: This note may have been dictated using voice recognition software and may contain grammatical errors.     NOTE: Consult report sent to referring provider via [employer].

## 2024-07-16 ENCOUNTER — PATIENT MESSAGE (OUTPATIENT)
Dept: ORTHOPEDICS | Facility: CLINIC | Age: 49
End: 2024-07-16
Payer: COMMERCIAL

## 2024-07-23 ENCOUNTER — PATIENT MESSAGE (OUTPATIENT)
Dept: ORTHOPEDICS | Facility: CLINIC | Age: 49
End: 2024-07-23
Payer: COMMERCIAL

## 2024-07-25 ENCOUNTER — ANESTHESIA EVENT (OUTPATIENT)
Dept: SURGERY | Facility: HOSPITAL | Age: 49
End: 2024-07-25
Payer: COMMERCIAL

## 2024-07-26 DIAGNOSIS — E03.9 ACQUIRED HYPOTHYROIDISM: ICD-10-CM

## 2024-07-26 DIAGNOSIS — I10 HYPERTENSION, UNSPECIFIED TYPE: ICD-10-CM

## 2024-07-26 DIAGNOSIS — E03.9 HYPOTHYROIDISM, UNSPECIFIED TYPE: ICD-10-CM

## 2024-07-26 RX ORDER — LEVOTHYROXINE SODIUM 100 UG/1
100 TABLET ORAL
Qty: 90 TABLET | Refills: 3 | Status: SHIPPED | OUTPATIENT
Start: 2024-07-26

## 2024-07-29 ENCOUNTER — PATIENT MESSAGE (OUTPATIENT)
Dept: ADMINISTRATIVE | Facility: OTHER | Age: 49
End: 2024-07-29
Payer: COMMERCIAL

## 2024-07-30 ENCOUNTER — PATIENT MESSAGE (OUTPATIENT)
Dept: ADMINISTRATIVE | Facility: OTHER | Age: 49
End: 2024-07-30
Payer: COMMERCIAL

## 2024-07-31 ENCOUNTER — PATIENT MESSAGE (OUTPATIENT)
Dept: ADMINISTRATIVE | Facility: OTHER | Age: 49
End: 2024-07-31
Payer: COMMERCIAL

## 2024-08-01 ENCOUNTER — HOSPITAL ENCOUNTER (OUTPATIENT)
Facility: HOSPITAL | Age: 49
Discharge: HOME | End: 2024-08-01
Attending: SPECIALIST | Admitting: SPECIALIST
Payer: COMMERCIAL

## 2024-08-01 ENCOUNTER — ANESTHESIA (OUTPATIENT)
Dept: SURGERY | Facility: HOSPITAL | Age: 49
End: 2024-08-01
Payer: COMMERCIAL

## 2024-08-01 DIAGNOSIS — S83.242D ACUTE MEDIAL MENISCUS TEAR OF LEFT KNEE, SUBSEQUENT ENCOUNTER: ICD-10-CM

## 2024-08-01 LAB
B-HCG UR QL: NEGATIVE
CTP QC/QA: YES
POCT GLUCOSE: 107 MG/DL (ref 70–110)

## 2024-08-01 PROCEDURE — 63600175 PHARM REV CODE 636 W HCPCS: Performed by: SPECIALIST

## 2024-08-01 PROCEDURE — 29881 ARTHRS KNE SRG MNISECTMY M/L: CPT | Mod: AS,LT,, | Performed by: PHYSICIAN ASSISTANT

## 2024-08-01 PROCEDURE — 81025 URINE PREGNANCY TEST: CPT | Performed by: SPECIALIST

## 2024-08-01 PROCEDURE — 37000009 HC ANESTHESIA EA ADD 15 MINS: Performed by: SPECIALIST

## 2024-08-01 PROCEDURE — 25000003 PHARM REV CODE 250: Performed by: SPECIALIST

## 2024-08-01 PROCEDURE — 25000003 PHARM REV CODE 250

## 2024-08-01 PROCEDURE — 36000711: Performed by: SPECIALIST

## 2024-08-01 PROCEDURE — 37000008 HC ANESTHESIA 1ST 15 MINUTES: Performed by: SPECIALIST

## 2024-08-01 PROCEDURE — 27201423 OPTIME MED/SURG SUP & DEVICES STERILE SUPPLY: Performed by: SPECIALIST

## 2024-08-01 PROCEDURE — 82962 GLUCOSE BLOOD TEST: CPT | Performed by: SPECIALIST

## 2024-08-01 PROCEDURE — 29881 ARTHRS KNE SRG MNISECTMY M/L: CPT | Mod: LT,,, | Performed by: SPECIALIST

## 2024-08-01 PROCEDURE — 71000033 HC RECOVERY, INTIAL HOUR: Performed by: SPECIALIST

## 2024-08-01 PROCEDURE — 36000710: Performed by: SPECIALIST

## 2024-08-01 PROCEDURE — 25000003 PHARM REV CODE 250: Performed by: PHYSICIAN ASSISTANT

## 2024-08-01 PROCEDURE — 63600175 PHARM REV CODE 636 W HCPCS: Performed by: PHYSICIAN ASSISTANT

## 2024-08-01 PROCEDURE — 63600175 PHARM REV CODE 636 W HCPCS

## 2024-08-01 PROCEDURE — 71000016 HC POSTOP RECOV ADDL HR: Performed by: SPECIALIST

## 2024-08-01 PROCEDURE — 63600175 PHARM REV CODE 636 W HCPCS: Performed by: NURSE ANESTHETIST, CERTIFIED REGISTERED

## 2024-08-01 PROCEDURE — 71000015 HC POSTOP RECOV 1ST HR: Performed by: SPECIALIST

## 2024-08-01 PROCEDURE — 25000003 PHARM REV CODE 250: Performed by: NURSE ANESTHETIST, CERTIFIED REGISTERED

## 2024-08-01 RX ORDER — SODIUM CHLORIDE, SODIUM GLUCONATE, SODIUM ACETATE, POTASSIUM CHLORIDE AND MAGNESIUM CHLORIDE 30; 37; 368; 526; 502 MG/100ML; MG/100ML; MG/100ML; MG/100ML; MG/100ML
INJECTION, SOLUTION INTRAVENOUS CONTINUOUS
Status: DISCONTINUED | OUTPATIENT
Start: 2024-08-01 | End: 2024-08-01 | Stop reason: HOSPADM

## 2024-08-01 RX ORDER — MORPHINE SULFATE 4 MG/ML
3 INJECTION, SOLUTION INTRAMUSCULAR; INTRAVENOUS
Status: DISCONTINUED | OUTPATIENT
Start: 2024-08-01 | End: 2024-08-01 | Stop reason: HOSPADM

## 2024-08-01 RX ORDER — DIPHENHYDRAMINE HYDROCHLORIDE 50 MG/ML
25 INJECTION INTRAMUSCULAR; INTRAVENOUS EVERY 6 HOURS PRN
OUTPATIENT
Start: 2024-08-01

## 2024-08-01 RX ORDER — EPINEPHRINE 1 MG/ML
INJECTION, SOLUTION, CONCENTRATE INTRAVENOUS
Status: DISCONTINUED | OUTPATIENT
Start: 2024-08-01 | End: 2024-08-01 | Stop reason: HOSPADM

## 2024-08-01 RX ORDER — DEXAMETHASONE SODIUM PHOSPHATE 4 MG/ML
INJECTION, SOLUTION INTRA-ARTICULAR; INTRALESIONAL; INTRAMUSCULAR; INTRAVENOUS; SOFT TISSUE
Status: DISCONTINUED | OUTPATIENT
Start: 2024-08-01 | End: 2024-08-01

## 2024-08-01 RX ORDER — LIDOCAINE HYDROCHLORIDE 10 MG/ML
INJECTION, SOLUTION INFILTRATION; PERINEURAL
Status: DISCONTINUED | OUTPATIENT
Start: 2024-08-01 | End: 2024-08-01 | Stop reason: HOSPADM

## 2024-08-01 RX ORDER — SCOLOPAMINE TRANSDERMAL SYSTEM 1 MG/1
1 PATCH, EXTENDED RELEASE TRANSDERMAL ONCE AS NEEDED
Status: DISCONTINUED | OUTPATIENT
Start: 2024-08-01 | End: 2024-08-01 | Stop reason: HOSPADM

## 2024-08-01 RX ORDER — GLUCAGON 1 MG
1 KIT INJECTION
OUTPATIENT
Start: 2024-08-01

## 2024-08-01 RX ORDER — ONDANSETRON 4 MG/1
8 TABLET, ORALLY DISINTEGRATING ORAL EVERY 8 HOURS PRN
Status: DISCONTINUED | OUTPATIENT
Start: 2024-08-01 | End: 2024-08-01 | Stop reason: HOSPADM

## 2024-08-01 RX ORDER — ACETAMINOPHEN 500 MG
1000 TABLET ORAL
Status: COMPLETED | OUTPATIENT
Start: 2024-08-01 | End: 2024-08-01

## 2024-08-01 RX ORDER — FENTANYL CITRATE 50 UG/ML
INJECTION, SOLUTION INTRAMUSCULAR; INTRAVENOUS
Status: DISCONTINUED | OUTPATIENT
Start: 2024-08-01 | End: 2024-08-01

## 2024-08-01 RX ORDER — MIDAZOLAM HYDROCHLORIDE 1 MG/ML
INJECTION INTRAMUSCULAR; INTRAVENOUS
Status: DISCONTINUED | OUTPATIENT
Start: 2024-08-01 | End: 2024-08-01

## 2024-08-01 RX ORDER — HYDROCODONE BITARTRATE AND ACETAMINOPHEN 5; 325 MG/1; MG/1
1 TABLET ORAL EVERY 4 HOURS PRN
Status: DISCONTINUED | OUTPATIENT
Start: 2024-08-01 | End: 2024-08-01 | Stop reason: HOSPADM

## 2024-08-01 RX ORDER — ACETAMINOPHEN 10 MG/ML
1000 INJECTION, SOLUTION INTRAVENOUS ONCE
OUTPATIENT
Start: 2024-08-01 | End: 2024-08-01

## 2024-08-01 RX ORDER — MIDAZOLAM HYDROCHLORIDE 2 MG/2ML
2 INJECTION, SOLUTION INTRAMUSCULAR; INTRAVENOUS ONCE AS NEEDED
Status: DISCONTINUED | OUTPATIENT
Start: 2024-08-01 | End: 2024-08-01 | Stop reason: HOSPADM

## 2024-08-01 RX ORDER — LIDOCAINE HYDROCHLORIDE 10 MG/ML
INJECTION, SOLUTION INFILTRATION; PERINEURAL
Status: DISCONTINUED
Start: 2024-08-01 | End: 2024-08-01 | Stop reason: HOSPADM

## 2024-08-01 RX ORDER — KETOROLAC TROMETHAMINE 10 MG/1
10 TABLET, FILM COATED ORAL ONCE
Status: DISCONTINUED | OUTPATIENT
Start: 2024-08-01 | End: 2024-08-01 | Stop reason: HOSPADM

## 2024-08-01 RX ORDER — ONDANSETRON HYDROCHLORIDE 2 MG/ML
INJECTION, SOLUTION INTRAVENOUS
Status: DISCONTINUED | OUTPATIENT
Start: 2024-08-01 | End: 2024-08-01

## 2024-08-01 RX ORDER — LIDOCAINE HYDROCHLORIDE 10 MG/ML
INJECTION, SOLUTION EPIDURAL; INFILTRATION; INTRACAUDAL; PERINEURAL
Status: DISCONTINUED | OUTPATIENT
Start: 2024-08-01 | End: 2024-08-01

## 2024-08-01 RX ORDER — ONDANSETRON HYDROCHLORIDE 2 MG/ML
4 INJECTION, SOLUTION INTRAVENOUS DAILY PRN
OUTPATIENT
Start: 2024-08-01

## 2024-08-01 RX ORDER — GABAPENTIN 300 MG/1
300 CAPSULE ORAL
Status: COMPLETED | OUTPATIENT
Start: 2024-08-01 | End: 2024-08-01

## 2024-08-01 RX ORDER — MUPIROCIN 20 MG/G
OINTMENT TOPICAL 2 TIMES DAILY
Status: DISCONTINUED | OUTPATIENT
Start: 2024-08-01 | End: 2024-08-01 | Stop reason: HOSPADM

## 2024-08-01 RX ORDER — HYDROMORPHONE HYDROCHLORIDE 2 MG/ML
0.2 INJECTION, SOLUTION INTRAMUSCULAR; INTRAVENOUS; SUBCUTANEOUS EVERY 5 MIN PRN
OUTPATIENT
Start: 2024-08-01

## 2024-08-01 RX ORDER — HYDROCODONE BITARTRATE AND ACETAMINOPHEN 5; 325 MG/1; MG/1
1 TABLET ORAL EVERY 6 HOURS PRN
Qty: 28 TABLET | Refills: 0 | Status: SHIPPED | OUTPATIENT
Start: 2024-08-01 | End: 2024-08-08

## 2024-08-01 RX ORDER — PROPOFOL 10 MG/ML
INJECTION, EMULSION INTRAVENOUS
Status: DISCONTINUED | OUTPATIENT
Start: 2024-08-01 | End: 2024-08-01

## 2024-08-01 RX ORDER — METOCLOPRAMIDE HYDROCHLORIDE 5 MG/ML
10 INJECTION INTRAMUSCULAR; INTRAVENOUS EVERY 6 HOURS PRN
Status: DISCONTINUED | OUTPATIENT
Start: 2024-08-01 | End: 2024-08-01 | Stop reason: HOSPADM

## 2024-08-01 RX ORDER — ALUMINUM HYDROXIDE, MAGNESIUM HYDROXIDE, AND SIMETHICONE 1200; 120; 1200 MG/30ML; MG/30ML; MG/30ML
30 SUSPENSION ORAL EVERY 6 HOURS PRN
Status: DISCONTINUED | OUTPATIENT
Start: 2024-08-01 | End: 2024-08-01 | Stop reason: HOSPADM

## 2024-08-01 RX ORDER — SODIUM CHLORIDE 9 MG/ML
INJECTION, SOLUTION INTRAVENOUS CONTINUOUS
Status: DISCONTINUED | OUTPATIENT
Start: 2024-08-01 | End: 2024-08-01 | Stop reason: HOSPADM

## 2024-08-01 RX ORDER — METHOCARBAMOL 750 MG/1
750 TABLET, FILM COATED ORAL EVERY 6 HOURS PRN
Status: DISCONTINUED | OUTPATIENT
Start: 2024-08-01 | End: 2024-08-01 | Stop reason: HOSPADM

## 2024-08-01 RX ORDER — LIDOCAINE HYDROCHLORIDE 10 MG/ML
1 INJECTION, SOLUTION EPIDURAL; INFILTRATION; INTRACAUDAL; PERINEURAL ONCE
Status: DISCONTINUED | OUTPATIENT
Start: 2024-08-01 | End: 2024-08-01 | Stop reason: HOSPADM

## 2024-08-01 RX ADMIN — PROPOFOL 200 MG: 10 INJECTION, EMULSION INTRAVENOUS at 08:08

## 2024-08-01 RX ADMIN — SODIUM CHLORIDE, SODIUM GLUCONATE, SODIUM ACETATE, POTASSIUM CHLORIDE AND MAGNESIUM CHLORIDE: 526; 502; 368; 37; 30 INJECTION, SOLUTION INTRAVENOUS at 08:08

## 2024-08-01 RX ADMIN — METHOCARBAMOL 750 MG: 750 TABLET ORAL at 11:08

## 2024-08-01 RX ADMIN — ACETAMINOPHEN 1000 MG: 500 TABLET ORAL at 07:08

## 2024-08-01 RX ADMIN — MORPHINE SULFATE 3 MG: 4 INJECTION INTRAVENOUS at 11:08

## 2024-08-01 RX ADMIN — SODIUM CHLORIDE, SODIUM GLUCONATE, SODIUM ACETATE, POTASSIUM CHLORIDE AND MAGNESIUM CHLORIDE: 526; 502; 368; 37; 30 INJECTION, SOLUTION INTRAVENOUS at 07:08

## 2024-08-01 RX ADMIN — FENTANYL CITRATE 50 MCG: 50 INJECTION, SOLUTION INTRAMUSCULAR; INTRAVENOUS at 09:08

## 2024-08-01 RX ADMIN — DEXAMETHASONE SODIUM PHOSPHATE 4 MG: 4 INJECTION, SOLUTION INTRA-ARTICULAR; INTRALESIONAL; INTRAMUSCULAR; INTRAVENOUS; SOFT TISSUE at 08:08

## 2024-08-01 RX ADMIN — GABAPENTIN 300 MG: 300 CAPSULE ORAL at 07:08

## 2024-08-01 RX ADMIN — LIDOCAINE HYDROCHLORIDE 50 MG: 10 INJECTION, SOLUTION EPIDURAL; INFILTRATION; INTRACAUDAL; PERINEURAL at 08:08

## 2024-08-01 RX ADMIN — HYDROCODONE BITARTRATE AND ACETAMINOPHEN 1 TABLET: 5; 325 TABLET ORAL at 10:08

## 2024-08-01 RX ADMIN — ONDANSETRON HYDROCHLORIDE 4 MG: 2 SOLUTION INTRAMUSCULAR; INTRAVENOUS at 08:08

## 2024-08-01 RX ADMIN — MIDAZOLAM 2 MG: 1 INJECTION INTRAMUSCULAR; INTRAVENOUS at 08:08

## 2024-08-01 RX ADMIN — CEFAZOLIN 2 G: 2 INJECTION, POWDER, FOR SOLUTION INTRAMUSCULAR; INTRAVENOUS at 08:08

## 2024-08-01 RX ADMIN — FENTANYL CITRATE 50 MCG: 50 INJECTION, SOLUTION INTRAMUSCULAR; INTRAVENOUS at 08:08

## 2024-08-01 NOTE — ANESTHESIA PROCEDURE NOTES
Intubation    Date/Time: 8/1/2024 8:43 AM    Performed by: Ese Escobar CRNA  Authorized by: Mike García MD    Intubation:     Induction:  Intravenous    Intubated:  Postinduction    Mask Ventilation:  Easy mask    Attempts:  1    Attempted By:  CRNA    Difficult Airway Encountered?: No      Complications:  None    Airway Device:  Supraglottic airway/LMA    Airway Device Size:  4.0    Style/Cuff Inflation:  Cuffed (inflated to minimal occlusive pressure)    Inflation Amount (mL):  5    Secured at:  The lips    Placement Verified By:  Capnometry    Complicating Factors:  None    Findings Post-Intubation:  BS equal bilateral and atraumatic/condition of teeth unchanged

## 2024-08-01 NOTE — ANESTHESIA POSTPROCEDURE EVALUATION
Anesthesia Post Evaluation    Patient: Maria E Lopez    Procedure(s) Performed: Procedure(s) (LRB):  ARTHROSCOPY, KNEE, WITH MENISCECTOMY (Left)    Final Anesthesia Type: general      Patient location during evaluation: PACU  Patient participation: Yes- Able to Participate  Level of consciousness: awake and alert  Post-procedure vital signs: reviewed and stable  Pain management: adequate  Airway patency: patent      Anesthetic complications: no      Cardiovascular status: blood pressure returned to baseline  Respiratory status: unassisted  Hydration status: euvolemic  Follow-up not needed.              Vitals Value Taken Time   /101 08/01/24 1145   Temp 36.4 °C (97.6 °F) 08/01/24 1045   Pulse 59 08/01/24 1153   Resp 18 08/01/24 1145   SpO2 100 % 08/01/24 1153   Vitals shown include unfiled device data.      Event Time   Out of Recovery 09:40:00         Pain/Balaji Score: Pain Rating Prior to Med Admin: 7 (8/1/2024 11:13 AM)  Balaji Score: 10 (8/1/2024 12:30 PM)  Modified Balaji Score: 20 (8/1/2024 12:30 PM)

## 2024-08-01 NOTE — TRANSFER OF CARE
"Anesthesia Transfer of Care Note    Patient: Maria E Lopez    Procedure(s) Performed: Procedure(s) (LRB):  ARTHROSCOPY, KNEE, WITH MENISCECTOMY (Left)    Patient location: PACU    Anesthesia Type: general    Transport from OR: Transported from OR on room air with adequate spontaneous ventilation    Post pain: adequate analgesia    Post assessment: no apparent anesthetic complications    Post vital signs: stable    Level of consciousness: sedated    Nausea/Vomiting: no nausea/vomiting    Complications: none    Transfer of care protocol was followed      Last vitals: Visit Vitals  /76   Pulse 71   Temp 36 °C (96.8 °F)   Resp 14   Ht 5' 5" (1.651 m)   Wt 101.7 kg (224 lb 3.3 oz)   LMP  (LMP Unknown)   SpO2 100%   Breastfeeding No   BMI 37.31 kg/m²     "

## 2024-08-01 NOTE — ANESTHESIA PREPROCEDURE EVALUATION
"                                                                                                             08/01/2024  Maria E Lopez is a 48 y.o., female.    "History of present illness:     This is a 48 y.o. year old female who presents today for MRI test results of the left knee.  No interval change since last exam.  She continues to have pain..  The pain is medial and has occurred since she injured herself while exercising back in April.  The injection helped 2-3 days but the pain has come back and remained constant over the past 6 weeks.  Exercise and therapy has not helped.    ...    Past Medical History:   Diagnosis Date    Chronic constipation      Hashimoto's thyroiditis      Hyperlipidemia      Hypertension      Hypothyroidism      Obesity      Osteoarthritis      Prediabetes                 Past Surgical History:   Procedure Laterality Date    ARTHROPLASTY   10/26/2021    BIOPSY OF LESION        JOINT REPLACEMENT   Right Knee     2021    replacement of rt knee joint Right 10/26/2021    rt knee arthroscopy       ...    Assessment: Acute medial meniscus tear of left knee, subsequent encounter           Plan:   Arthroscopic left knee partial medial meniscectomy     The proposed procedure as well as associated risks/benefits were discussed at length with the patient and family with risks to include pain, bleeding, infection, future surgeries, neurovascular compromise, loss of limb, heart attack, stroke, deep vein thrombosis, and even death. They understand and agree with the treatment plan.   "      Pre-op Assessment    I have reviewed the Patient Summary Reports.     I have reviewed the Nursing Notes. I have reviewed the NPO Status.   I have reviewed the Medications.     Review of Systems  Anesthesia Hx:  No problems with previous Anesthesia             Denies Family Hx of Anesthesia complications.    Denies Personal Hx of Anesthesia complications.                    Cardiovascular:  Exercise " tolerance: good   Hypertension                                        Pulmonary:        Sleep Apnea                Renal/:  Chronic Renal Disease                Musculoskeletal:  Arthritis               Endocrine:   Hypothyroidism              Physical Exam  General: Well nourished, Cooperative, Alert and Oriented    Airway:  Mallampati: II   Mouth Opening: Normal  TM Distance: Normal  Tongue: Normal  Neck ROM: Normal ROM    Dental:  Intact    Chest/Lungs:  Clear to auscultation, Normal Respiratory Rate    Heart:  Rate: Normal  Rhythm: Regular Rhythm        Anesthesia Plan  Type of Anesthesia, risks & benefits discussed:    Anesthesia Type: Gen Supraglottic Airway  Intra-op Monitoring Plan: Standard ASA Monitors  Post Op Pain Control Plan: multimodal analgesia  Induction:  IV  Airway Plan: , Post-Induction  Informed Consent: Informed consent signed with the Patient and all parties understand the risks and agree with anesthesia plan.  All questions answered.   ASA Score: 2  Day of Surgery Review of History & Physical: H&P Update referred to the surgeon/provider.    Ready For Surgery From Anesthesia Perspective.     .

## 2024-08-02 VITALS
OXYGEN SATURATION: 100 % | TEMPERATURE: 98 F | DIASTOLIC BLOOD PRESSURE: 78 MMHG | HEIGHT: 65 IN | BODY MASS INDEX: 37.35 KG/M2 | SYSTOLIC BLOOD PRESSURE: 147 MMHG | RESPIRATION RATE: 18 BRPM | WEIGHT: 224.19 LBS | HEART RATE: 59 BPM

## 2024-08-14 DIAGNOSIS — E78.5 HYPERLIPIDEMIA, UNSPECIFIED HYPERLIPIDEMIA TYPE: ICD-10-CM

## 2024-08-14 RX ORDER — ROSUVASTATIN CALCIUM 20 MG/1
20 TABLET, COATED ORAL
Qty: 90 TABLET | Refills: 1 | Status: SHIPPED | OUTPATIENT
Start: 2024-08-14

## 2024-08-16 ENCOUNTER — OFFICE VISIT (OUTPATIENT)
Dept: ORTHOPEDICS | Facility: CLINIC | Age: 49
End: 2024-08-16
Payer: COMMERCIAL

## 2024-08-16 VITALS
WEIGHT: 220 LBS | DIASTOLIC BLOOD PRESSURE: 85 MMHG | BODY MASS INDEX: 36.65 KG/M2 | HEIGHT: 65 IN | SYSTOLIC BLOOD PRESSURE: 122 MMHG

## 2024-08-16 DIAGNOSIS — M17.12 PRIMARY OSTEOARTHRITIS OF LEFT KNEE: ICD-10-CM

## 2024-08-16 DIAGNOSIS — Z98.890 S/P LEFT KNEE ARTHROSCOPY: Primary | ICD-10-CM

## 2024-08-16 NOTE — PROGRESS NOTES
Chief Complaint:   Chief Complaint   Patient presents with    Left Knee - Post-op Evaluation     Pt states she is doing very well. Still having pain and soreness. Pt is doing PT at home and managing the pain with otc meds.       History of present illness:    48-year-old female presents office today for follow-up on her left knee.  She is 2 weeks S/P left knee arthroscopic partial medial meniscectomy.  Overall she is doing very well.  Pain is manageable.  She is working with physical therapy    Past Medical History:   Diagnosis Date    Chronic constipation     Hashimoto's thyroiditis     Hyperlipidemia     Hypertension     Hypothyroidism     Obesity     Osteoarthritis     Prediabetes     Renal disorder     autoimmune disorder due to increased levels protein    Sleep apnea     Torn meniscus        Past Surgical History:   Procedure Laterality Date    BIOPSY OF LESION      COLONOSCOPY      KNEE ARTHROSCOPY W/ MENISCECTOMY Left 8/1/2024    Procedure: ARTHROSCOPY, KNEE, WITH MENISCECTOMY;  Surgeon: Jorge A Kingsley MD;  Location: [facility];  Service: Orthopedics;  Laterality: Left;  left knee ats    rt knee arthroscopy      TOTAL KNEE ARTHROPLASTY Right     partial       Current Outpatient Medications   Medication Sig    acetaminophen (TYLENOL) 500 MG tablet Take 500 mg by mouth every 6 (six) hours as needed for Pain.    IBSRELA 50 mg Tab Take 50 tablets by mouth 2 (two) times a day.    Lactobacillus rhamnosus GG (CULTURELLE) 10 billion cell capsule Take 1 capsule by mouth once daily.    levonorgestreL (LILETTA) 20.1 mcg/24 hrs (6 yrs) 52 mg IUD 52 mg by Intrauterine route.    levothyroxine (SYNTHROID) 100 MCG tablet Take 1 tablet by mouth once daily    liothyronine (CYTOMEL) 5 MCG Tab Take 1 tablet (5 mcg total) by mouth once daily.    losartan (COZAAR) 50 MG tablet Take 1 tablet (50 mg total) by mouth once daily.    mycophenolate (CELLCEPT) 250 mg Cap Take 1 capsule (250 mg total) by mouth 2 (two) times daily.     rosuvastatin (CRESTOR) 20 MG tablet Take 1 tablet by mouth once daily    leflunomide (ARAVA) 10 MG Tab  (Patient not taking: Reported on 7/8/2024)    omeprazole (PRILOSEC) 20 MG capsule Take by mouth Daily. (Patient not taking: Reported on 6/19/2024)    turmeric root extract 500 mg Cap Take by mouth as needed.     No current facility-administered medications for this visit.       Review of patient's allergies indicates:  No Known Allergies    Family History   Problem Relation Name Age of Onset    Thyroid disease Mother Nohemy Pena     Diabetes Mother Nohemy Pena     Hypertension Mother Nohemy Pena     Arthritis Mother Nohemy Pena     Liver cancer Father Santiago Pena     Heart disease Father Santiago Pena     COPD Father Santiago Pena     Alcohol abuse Father Santiago Pena     Heart disease Paternal Grandfather Leonard Nichols     Cancer Paternal Grandmother Jolene Nichols     Stroke Maternal Grandfather Hood Galeana        Social History     Socioeconomic History    Marital status:    Tobacco Use    Smoking status: Never     Passive exposure: Never    Smokeless tobacco: Never   Substance and Sexual Activity    Alcohol use: Never    Drug use: Never    Sexual activity: Not Currently     Partners: Male     Birth control/protection: I.U.D., None   Social History Narrative    The patient is  and has 3 children.  She works as a .     Social Determinants of Health     Financial Resource Strain: Medium Risk (4/8/2024)    Overall Financial Resource Strain (CARDIA)     Difficulty of Paying Living Expenses: Somewhat hard   Food Insecurity: No Food Insecurity (4/8/2024)    Hunger Vital Sign     Worried About Running Out of Food in the Last Year: Never true     Ran Out of Food in the Last Year: Never true   Transportation Needs: No Transportation Needs (4/8/2024)    PRAPARE - Transportation     Lack of Transportation (Medical): No     Lack of Transportation  "(Non-Medical): No   Physical Activity: Sufficiently Active (4/8/2024)    Exercise Vital Sign     Days of Exercise per Week: 3 days     Minutes of Exercise per Session: 60 min   Stress: No Stress Concern Present (4/8/2024)    Citizen of [place] of Occupational Health - Occupational Stress Questionnaire     Feeling of Stress : Only a little   Housing Stability: Unknown (4/8/2024)    Housing Stability Vital Sign     Unable to Pay for Housing in the Last Year: No     Unstable Housing in the Last Year: No         Review of Systems:    Constitution:   Denies chills, fever, and sweats.  HENT:   Denies headaches or blurry vision.  Cardiovascular:  Denies chest pain or irregular heart beat.  Respiratory:   Denies cough or shortness of breath.  Gastrointestinal:  Denies abdominal pain, nausea, or vomiting.  Musculoskeletal:   Denies muscle cramps.  Neurological:   Denies dizziness or focal weakness.  Psychiatric/Behavior: Normal mental status.  Hematology/Lymph:  Denies bleeding problem or easy bruising/bleeding.  Skin:    Denies rash or suspicious lesions.    Examination:    Vital Signs:    Vitals:    08/16/24 0953   BP: 122/85   Pulse: (P) 84   Weight: 99.8 kg (220 lb)   Height: 5' 5" (1.651 m)   PainSc:   3       Body mass index is 36.61 kg/m².    Constitution:   Well-developed, well nourished patient in no acute distress.  Neurological:   Alert and oriented x 3 and cooperative to examination.     Psychiatric/Behavior: Normal mental status.  Respiratory:   No shortness of breath.  Nonlabored breathing  Cardiovascular:           Regular rate and rhythm  Eyes:    Extraoccular muscles intact  Skin:    No scars, rash or suspicious lesions.    Physical Exam:     Left Knee    Mild swelling, no erythema    Surgical incision C/D/I with sutures     Active flexion 120 degrees   Active extension 0 degrees    Thigh and calf compartments soft and compressible No weakness was observed.    NVI distally         Assessment:     S/P left " knee arthroscopy    Primary osteoarthritis of left knee        Plan:      Sutures removed today.  Arthroscopic findings discussed with the patient.  She was noted to have grade 4 changes in the medial femoral condyle.  Continue with physical therapy and low-impact exercising.  She will follow up with us as needed        No follow-ups on file.    DISCLAIMER: This note may have been dictated using voice recognition software and may contain grammatical errors.

## 2024-08-26 ENCOUNTER — PATIENT MESSAGE (OUTPATIENT)
Dept: ORTHOPEDICS | Facility: CLINIC | Age: 49
End: 2024-08-26
Payer: COMMERCIAL

## 2024-09-03 ENCOUNTER — PATIENT MESSAGE (OUTPATIENT)
Dept: ORTHOPEDICS | Facility: CLINIC | Age: 49
End: 2024-09-03
Payer: COMMERCIAL

## 2024-09-16 ENCOUNTER — LAB VISIT (OUTPATIENT)
Dept: LAB | Facility: HOSPITAL | Age: 49
End: 2024-09-16
Payer: COMMERCIAL

## 2024-09-16 DIAGNOSIS — I10 PRIMARY HYPERTENSION: ICD-10-CM

## 2024-09-16 DIAGNOSIS — R80.1 PERSISTENT PROTEINURIA: ICD-10-CM

## 2024-09-16 DIAGNOSIS — N02.2 MEMBRANOUS NEPHROPATHY DETERMINED BY BIOPSY: ICD-10-CM

## 2024-09-16 LAB
ALBUMIN SERPL-MCNC: 3.9 G/DL (ref 3.5–5)
ALBUMIN/GLOB SERPL: 1.4 RATIO (ref 1.1–2)
ALP SERPL-CCNC: 48 UNIT/L (ref 40–150)
ALT SERPL-CCNC: 14 UNIT/L (ref 0–55)
ANION GAP SERPL CALC-SCNC: 7 MEQ/L
AST SERPL-CCNC: 17 UNIT/L (ref 5–34)
BASOPHILS # BLD AUTO: 0.05 X10(3)/MCL
BASOPHILS NFR BLD AUTO: 0.9 %
BILIRUB SERPL-MCNC: 1 MG/DL
BUN SERPL-MCNC: 8.5 MG/DL (ref 7–18.7)
CALCIUM SERPL-MCNC: 9.8 MG/DL (ref 8.4–10.2)
CHLORIDE SERPL-SCNC: 110 MMOL/L (ref 98–107)
CO2 SERPL-SCNC: 26 MMOL/L (ref 22–29)
CREAT SERPL-MCNC: 0.86 MG/DL (ref 0.55–1.02)
CREAT UR-MCNC: 114.5 MG/DL (ref 45–106)
CREAT/UREA NIT SERPL: 10
EOSINOPHIL # BLD AUTO: 0.22 X10(3)/MCL (ref 0–0.9)
EOSINOPHIL NFR BLD AUTO: 4.1 %
ERYTHROCYTE [DISTWIDTH] IN BLOOD BY AUTOMATED COUNT: 13.3 % (ref 11.5–17)
GFR SERPLBLD CREATININE-BSD FMLA CKD-EPI: >60 ML/MIN/1.73/M2
GLOBULIN SER-MCNC: 2.7 GM/DL (ref 2.4–3.5)
GLUCOSE SERPL-MCNC: 103 MG/DL (ref 74–100)
HCT VFR BLD AUTO: 37.4 % (ref 37–47)
HGB BLD-MCNC: 12.1 G/DL (ref 12–16)
IMM GRANULOCYTES # BLD AUTO: 0.01 X10(3)/MCL (ref 0–0.04)
IMM GRANULOCYTES NFR BLD AUTO: 0.2 %
LYMPHOCYTES # BLD AUTO: 2.67 X10(3)/MCL (ref 0.6–4.6)
LYMPHOCYTES NFR BLD AUTO: 49.9 %
MCH RBC QN AUTO: 28.7 PG (ref 27–31)
MCHC RBC AUTO-ENTMCNC: 32.4 G/DL (ref 33–36)
MCV RBC AUTO: 88.6 FL (ref 80–94)
MONOCYTES # BLD AUTO: 0.34 X10(3)/MCL (ref 0.1–1.3)
MONOCYTES NFR BLD AUTO: 6.4 %
NEUTROPHILS # BLD AUTO: 2.06 X10(3)/MCL (ref 2.1–9.2)
NEUTROPHILS NFR BLD AUTO: 38.5 %
NRBC BLD AUTO-RTO: 0 %
PLATELET # BLD AUTO: 290 X10(3)/MCL (ref 130–400)
PMV BLD AUTO: 10 FL (ref 7.4–10.4)
POTASSIUM SERPL-SCNC: 4.2 MMOL/L (ref 3.5–5.1)
PROT SERPL-MCNC: 6.6 GM/DL (ref 6.4–8.3)
PROT UR STRIP-MCNC: <6.8 MG/DL
RBC # BLD AUTO: 4.22 X10(6)/MCL (ref 4.2–5.4)
SODIUM SERPL-SCNC: 143 MMOL/L (ref 136–145)
WBC # BLD AUTO: 5.35 X10(3)/MCL (ref 4.5–11.5)

## 2024-09-16 PROCEDURE — 85025 COMPLETE CBC W/AUTO DIFF WBC: CPT

## 2024-09-16 PROCEDURE — 80053 COMPREHEN METABOLIC PANEL: CPT

## 2024-09-16 PROCEDURE — 84156 ASSAY OF PROTEIN URINE: CPT

## 2024-09-16 PROCEDURE — 82570 ASSAY OF URINE CREATININE: CPT

## 2024-09-16 PROCEDURE — 36415 COLL VENOUS BLD VENIPUNCTURE: CPT

## 2024-09-19 ENCOUNTER — OFFICE VISIT (OUTPATIENT)
Dept: NEPHROLOGY | Facility: CLINIC | Age: 49
End: 2024-09-19
Payer: COMMERCIAL

## 2024-09-19 VITALS
HEIGHT: 65 IN | HEART RATE: 85 BPM | WEIGHT: 226.81 LBS | TEMPERATURE: 98 F | BODY MASS INDEX: 37.79 KG/M2 | OXYGEN SATURATION: 97 % | DIASTOLIC BLOOD PRESSURE: 88 MMHG | RESPIRATION RATE: 20 BRPM | SYSTOLIC BLOOD PRESSURE: 130 MMHG

## 2024-09-19 DIAGNOSIS — R80.1 PERSISTENT PROTEINURIA: ICD-10-CM

## 2024-09-19 DIAGNOSIS — I10 PRIMARY HYPERTENSION: ICD-10-CM

## 2024-09-19 DIAGNOSIS — N02.2 MEMBRANOUS NEPHROPATHY DETERMINED BY BIOPSY: Primary | ICD-10-CM

## 2024-09-19 PROCEDURE — 3044F HG A1C LEVEL LT 7.0%: CPT | Mod: CPTII,S$GLB,,

## 2024-09-19 PROCEDURE — 3066F NEPHROPATHY DOC TX: CPT | Mod: CPTII,S$GLB,,

## 2024-09-19 PROCEDURE — 1159F MED LIST DOCD IN RCRD: CPT | Mod: CPTII,S$GLB,,

## 2024-09-19 PROCEDURE — 3008F BODY MASS INDEX DOCD: CPT | Mod: CPTII,S$GLB,,

## 2024-09-19 PROCEDURE — 4010F ACE/ARB THERAPY RXD/TAKEN: CPT | Mod: CPTII,S$GLB,,

## 2024-09-19 PROCEDURE — 3079F DIAST BP 80-89 MM HG: CPT | Mod: CPTII,S$GLB,,

## 2024-09-19 PROCEDURE — 99999 PR PBB SHADOW E&M-EST. PATIENT-LVL IV: CPT | Mod: PBBFAC,,,

## 2024-09-19 PROCEDURE — 3075F SYST BP GE 130 - 139MM HG: CPT | Mod: CPTII,S$GLB,,

## 2024-09-19 PROCEDURE — 99213 OFFICE O/P EST LOW 20 MIN: CPT | Mod: S$GLB,,,

## 2024-09-19 NOTE — PATIENT INSTRUCTIONS
Please give urine every 4 weeks for the next 2 months and then urine and labs in 3 months with a follow up appointment

## 2024-09-19 NOTE — PROGRESS NOTES
[facility] Ambulatory Progress Note      HPI  Maria E Lopez, 48 y.o. female, presents to office for a follow up visit for membranous nephropathy.  Patient has responded nicely to steroids and CellCept.  3 months ago we were able to discontinue steroids and wean her CellCept down.   She did have a meniscus tear of her left knee repaired however she is still having pain in reports needing another surgery.    She is seeing Dr. Riggins, Rheumatology for rheumatoid arthritis; she was started on leflunomide however patient discontinued it on her own due to not wanting to be on multiple immunosuppressive therapies    Patient denies taking NSAIDs or new antibiotics. Also denies recent episode of dehydration, diarrhea, vomiting, acute illness, hospitalization, recent angiograms or exposure to IV radiocontrast.        Medical Diagnoses:   Past Medical History:   Diagnosis Date    Chronic constipation     Hashimoto's thyroiditis     Hyperlipidemia     Hypertension     Hypothyroidism     Obesity     Osteoarthritis     Prediabetes     Renal disorder     autoimmune disorder due to increased levels protein    Sleep apnea     Torn meniscus      Patient Active Problem List   Diagnosis    Acquired hypothyroidism    Chronic constipation    Severe obesity (BMI 35.0-39.9) with comorbidity    Prediabetes    Microscopic hematuria    Osteoarthritis of right knee    Hypertension    Hyperlipidemia    Persistent proteinuria    Drug-induced immunodeficiency    Autoimmune disease    Membranous glomerulonephritis    Membranous nephropathy determined by biopsy       Surgical History:   Past Surgical History:   Procedure Laterality Date    BIOPSY OF LESION      COLONOSCOPY      KNEE ARTHROSCOPY W/ MENISCECTOMY Left 8/1/2024    Procedure: ARTHROSCOPY, KNEE, WITH MENISCECTOMY;  Surgeon: Jorge A Kingsley MD;  Location: [facility];  Service: Orthopedics;  Laterality: Left;  left knee ats    rt knee arthroscopy      TOTAL KNEE ARTHROPLASTY  Right     partial       Family History:   Family History   Problem Relation Name Age of Onset    Thyroid disease Mother Nohemy Pena     Diabetes Mother Nohemy Pena     Hypertension Mother Nohemy Pena     Arthritis Mother Nohemy Pena     Liver cancer Father Santiago Pena     Heart disease Father Santiago Pena     COPD Father Santiago Pena     Alcohol abuse Father Santiago Pena     Heart disease Paternal Grandfather Leonard Nichols     Cancer Paternal Grandmother Jolene Nichols     Stroke Maternal Grandfather Hood Galeana        Social History:   Social History     Tobacco Use    Smoking status: Never     Passive exposure: Never    Smokeless tobacco: Never   Substance Use Topics    Alcohol use: Never       Allergies:  Review of patient's allergies indicates:  No Known Allergies    Medications:    Current Outpatient Medications:     acetaminophen (TYLENOL) 500 MG tablet, Take 500 mg by mouth every 6 (six) hours as needed for Pain., Disp: , Rfl:     IBSRELA 50 mg Tab, Take 50 tablets by mouth 2 (two) times a day., Disp: , Rfl:     Lactobacillus rhamnosus GG (CULTURELLE) 10 billion cell capsule, Take 1 capsule by mouth once daily., Disp: , Rfl:     levonorgestreL (LILETTA) 20.1 mcg/24 hrs (6 yrs) 52 mg IUD, 52 mg by Intrauterine route., Disp: , Rfl:     levothyroxine (SYNTHROID) 100 MCG tablet, Take 1 tablet by mouth once daily, Disp: 90 tablet, Rfl: 3    losartan (COZAAR) 50 MG tablet, Take 1 tablet (50 mg total) by mouth once daily., Disp: 30 tablet, Rfl: 11    mycophenolate (CELLCEPT) 250 mg Cap, Take 1 capsule (250 mg total) by mouth 2 (two) times daily., Disp: 60 capsule, Rfl: 11    rosuvastatin (CRESTOR) 20 MG tablet, Take 1 tablet by mouth once daily, Disp: 90 tablet, Rfl: 1    turmeric root extract 500 mg Cap, Take by mouth as needed., Disp: , Rfl:        Review of Systems:    Constitutional: Denies fever, fatigue, generalized weakness  Skin: Denies wounds, no rashes, no itching, no  "new skin lesions  Respiratory:  Denies cough, shortness of breath, or wheezing  Cardiovascular: Denies chest pain, palpitations, or swelling  Gastrointestional: Denies abdominal pain, nausea, vomiting, diarrhea, or constipation  Genitourinary: Denies dysuria, hematuria, foamy urine, or incontinence; reports able to empty bladder  Musculoskeletal: Denies back or flank pain  Neurological: Denies headaches, dizziness, paresthesias, tremors or focal weakness      Vital Signs:  /88 (BP Location: Right arm, Patient Position: Sitting, BP Method: Medium (Manual))   Pulse 85   Temp 97.5 °F (36.4 °C) (Temporal)   Resp 20   Ht 5' 5" (1.651 m)   Wt 102.9 kg (226 lb 12.8 oz)   SpO2 97%   BMI 37.74 kg/m²   Body mass index is 37.74 kg/m².      Physical Exam:    General: no acute distress, awake, alert  Eyes: conjunctiva clear, eyelids without swelling  HENT: atraumatic, oropharynx and nasal mucosa patent  Neck: supple, trache midline, no JVD  Respiratory: equal, unlabored, clear to auscultation A/P  Cardiovascular: RRR without murmur or rub  Edema: none  Gastrointestinal: soft, non-tender, non-distended  Musculoskeletal:  Left knee pain remains after surgery  Integumentary: warm, dry; no rashes, wounds, or skin lesions  Neurological: oriented x4, appropriate, no acute deficits; no asterixis      Labs:        Component Value Date/Time     09/16/2024 1125     07/08/2024 1440     09/20/2016 0805     06/16/2015 1016    K 4.2 09/16/2024 1125    K 4.0 07/08/2024 1440    K 4.3 09/20/2016 0805    K 3.9 06/16/2015 1016     (H) 09/16/2024 1125     (H) 07/08/2024 1440     09/20/2016 0805     06/16/2015 1016    CO2 26 09/16/2024 1125    CO2 27 07/08/2024 1440    CO2 25 09/20/2016 0805    CO2 24 06/16/2015 1016    BUN 8.5 09/16/2024 1125    BUN 6.0 (L) 07/08/2024 1440    BUN 7 09/20/2016 0805    BUN 6 06/16/2015 1016    CREATININE 0.86 09/16/2024 1125    CREATININE 0.79 07/08/2024 " 1440    CREATININE 0.77 06/11/2024 0849    CREATININE 0.74 04/08/2024 1303    CREATININE 1.0 09/20/2016 0805    CREATININE 0.9 06/16/2015 1016    CREATININE 0.9 11/28/2012 0805    CREATININE 0.7 07/22/2010 0805    CALCIUM 9.8 09/16/2024 1125    CALCIUM 9.7 07/08/2024 1440    CALCIUM 9.0 09/20/2016 0805    CALCIUM 9.2 06/16/2015 1016    PHOS 3.7 12/12/2023 1440    PHOS 4.4 07/06/2023 0814           Component Value Date/Time    WBC 5.35 09/16/2024 1125    WBC 5.91 06/11/2024 0849    WBC 4.50 09/20/2016 0805    WBC 4.54 06/16/2015 1016    HGB 12.1 09/16/2024 1125    HGB 12.6 06/11/2024 0849    HGB 12.6 09/20/2016 0805    HGB 13.0 06/16/2015 1016    HCT 37.4 09/16/2024 1125    HCT 39.3 06/11/2024 0849    HCT 39.9 09/20/2016 0805    HCT 39.2 06/16/2015 1016     09/16/2024 1125     06/11/2024 0849     09/20/2016 0805     06/16/2015 1016       Urine Creatinine   Date Value Ref Range Status   09/16/2024 114.5 (H) 45.0 - 106.0 mg/dL Final   06/11/2024 53.4 45.0 - 106.0 mg/dL Final       Urine Protein Level   Date Value Ref Range Status   09/16/2024 <6.8 mg/dL Final   06/11/2024 <6.8 mg/dL Final         Impression:    1. Membranous nephropathy determined by biopsy        2. Primary hypertension        3. Persistent proteinuria          No evidence of proteinuria or reactivation of the disease  Blood pressure controlled    Plan:  Discontinue CellCept  UPCR every 4 weeks for the next 2 months and then labs and urine in 3 months with a follow up appointment     Dinesh RESTREPO      This note was created with the assistance of [employer] voice recognition software or phone dictation. There may be transcription errors as a result of using this technology however minimal. Effort has been made to assure accuracy of transcription but any obvious errors or omissions should be clarified with the author of the document.

## 2024-09-24 ENCOUNTER — PATIENT MESSAGE (OUTPATIENT)
Dept: ORTHOPEDICS | Facility: CLINIC | Age: 49
End: 2024-09-24
Payer: COMMERCIAL

## 2024-09-30 ENCOUNTER — PATIENT MESSAGE (OUTPATIENT)
Dept: FAMILY MEDICINE | Facility: CLINIC | Age: 49
End: 2024-09-30
Payer: COMMERCIAL

## 2024-09-30 ENCOUNTER — OFFICE VISIT (OUTPATIENT)
Dept: ORTHOPEDICS | Facility: CLINIC | Age: 49
End: 2024-09-30
Payer: COMMERCIAL

## 2024-09-30 VITALS
BODY MASS INDEX: 38.32 KG/M2 | WEIGHT: 230 LBS | HEART RATE: 77 BPM | DIASTOLIC BLOOD PRESSURE: 98 MMHG | SYSTOLIC BLOOD PRESSURE: 132 MMHG | HEIGHT: 65 IN

## 2024-09-30 DIAGNOSIS — M17.12 PRIMARY OSTEOARTHRITIS OF LEFT KNEE: Primary | ICD-10-CM

## 2024-09-30 DIAGNOSIS — D70.8 OTHER NEUTROPENIA: ICD-10-CM

## 2024-09-30 DIAGNOSIS — E03.9 HYPOTHYROIDISM, UNSPECIFIED TYPE: ICD-10-CM

## 2024-09-30 DIAGNOSIS — Z98.890 S/P LEFT KNEE ARTHROSCOPY: ICD-10-CM

## 2024-09-30 DIAGNOSIS — I10 HYPERTENSION, UNSPECIFIED TYPE: ICD-10-CM

## 2024-09-30 DIAGNOSIS — E78.5 HYPERLIPIDEMIA, UNSPECIFIED HYPERLIPIDEMIA TYPE: Primary | ICD-10-CM

## 2024-09-30 DIAGNOSIS — R73.03 PREDIABETES: ICD-10-CM

## 2024-09-30 RX ORDER — BETAMETHASONE SODIUM PHOSPHATE AND BETAMETHASONE ACETATE 3; 3 MG/ML; MG/ML
12 INJECTION, SUSPENSION INTRA-ARTICULAR; INTRALESIONAL; INTRAMUSCULAR; SOFT TISSUE
Status: DISCONTINUED | OUTPATIENT
Start: 2024-09-30 | End: 2024-09-30 | Stop reason: HOSPADM

## 2024-09-30 RX ADMIN — BETAMETHASONE SODIUM PHOSPHATE AND BETAMETHASONE ACETATE 12 MG: 3; 3 INJECTION, SUSPENSION INTRA-ARTICULAR; INTRALESIONAL; INTRAMUSCULAR; SOFT TISSUE at 10:09

## 2024-09-30 NOTE — PROCEDURES
Large Joint Aspiration/Injection: L knee    Date/Time: 9/30/2024 10:00 AM    Performed by: Conrad Reyes PA  Authorized by: Conrad Reyes PA    Consent Done?:  Yes (Verbal)  Indications:  Arthritis  Timeout: prior to procedure the correct patient, procedure, and site was verified    Prep: patient was prepped and draped in usual sterile fashion      Local anesthesia used?: Yes    Local anesthetic:  Lidocaine 2% without epinephrine    Details:  Needle Size:  25 G  Ultrasonic Guidance for needle placement?: No    Location:  Knee  Site:  L knee  Medications:  12 mg betamethasone acetate-betamethasone sodium phosphate 6 mg/mL  Patient tolerance:  Patient tolerated the procedure well with no immediate complications

## 2024-09-30 NOTE — PROGRESS NOTES
Chief Complaint:   Chief Complaint   Patient presents with    Left Knee - Follow-up, Injections     Pt is present for a Cortisone injection. Pt states she is still having constant pain and discomfort. Pt states that at the end of the day she can barely walk.        History of present illness:    48-year-old female presents office today for follow-up on her left knee.  She has here for cortisone injection.  She underwent a left knee arthroscopic partial medial meniscectomy just over 2 months ago.  She was noted to have grade 4 changes in the medial femoral condyle medial tibial plateau at the time of surgery.  She has some ongoing medial-sided pain.  She is unable to take NSAIDs with history of kidney disease.    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    IBSRELA 50 mg Tab Take 50 tablets by mouth 2 (two) times a day.    Lactobacillus rhamnosus GG (CULTURELLE) 10 billion cell capsule Take 1 capsule by mouth once daily.    levonorgestreL (LILETTA) 20.1 mcg/24 hrs (6 yrs) 52 mg IUD 52 mg by Intrauterine route.    levothyroxine (SYNTHROID) 100 MCG tablet Take 1 tablet by mouth once daily    losartan (COZAAR) 50 MG tablet Take 1 tablet (50 mg  total) by mouth once daily.    rosuvastatin (CRESTOR) 20 MG tablet Take 1 tablet by mouth once daily     No current facility-administered medications for this visit.       Review of patient's allergies indicates:  No Known Allergies    Family History   Problem Relation Name Age of Onset    Thyroid disease Mother Nohemy Pena     Diabetes Mother Nohemy Pena     Hypertension Mother Nohemy Pena     Arthritis Mother Nohemy Pena     Liver cancer Father Santiago Pena     Heart disease Father Santiago Pena     COPD Father Santiago Pena     Alcohol abuse Father Santiago Pena     Heart disease Paternal Grandfather Leonard Nichols     Cancer Paternal Grandmother Jolene Nichols     Stroke Maternal Grandfather Hood Galeana        Social History     Socioeconomic History    Marital status:    Tobacco Use    Smoking status: Never     Passive exposure: Never    Smokeless tobacco: Never   Substance and Sexual Activity    Alcohol use: Never    Drug use: Never    Sexual activity: Not Currently     Partners: Male     Birth control/protection: I.U.D., None   Social History Narrative    The patient is  and has 3 children.  She works as a .     Social Drivers of Health     Financial Resource Strain: Medium Risk (4/8/2024)    Overall Financial Resource Strain (CARDIA)     Difficulty of Paying Living Expenses: Somewhat hard   Food Insecurity: No Food Insecurity (4/8/2024)    Hunger Vital Sign     Worried About Running Out of Food in the Last Year: Never true     Ran Out of Food in the Last Year: Never true   Transportation Needs: No Transportation Needs (4/8/2024)    PRAPARE - Transportation     Lack of Transportation (Medical): No     Lack of Transportation (Non-Medical): No   Physical Activity: Sufficiently Active (4/8/2024)    Exercise Vital Sign     Days of Exercise per Week: 3 days     Minutes of Exercise per Session: 60 min   Stress: No Stress Concern Present (4/8/2024)     "[employer] [place] [employer] - Occupational Stress Questionnaire     Feeling of Stress : Only a little   Housing Stability: Unknown (4/8/2024)    Housing Stability Vital Sign     Unable to Pay for Housing in the Last Year: No     Unstable Housing in the Last Year: No         Review of Systems:    Constitution:   Denies chills, fever, and sweats.  HENT:   Denies headaches or blurry vision.  Cardiovascular:  Denies chest pain or irregular heart beat.  Respiratory:   Denies cough or shortness of breath.  Gastrointestinal:  Denies abdominal pain, nausea, or vomiting.  Musculoskeletal:   Denies muscle cramps.  Neurological:   Denies dizziness or focal weakness.  Psychiatric/Behavior: Normal mental status.  Hematology/Lymph:  Denies bleeding problem or easy bruising/bleeding.  Skin:    Denies rash or suspicious lesions.    Examination:    Vital Signs:    Vitals:    09/30/24 1008 09/30/24 1010   BP: (!) 132/98    Pulse: 77    Weight: 104.3 kg (230 lb)    Height: 5' 5" (1.651 m)    PainSc:    5       Body mass index is 38.27 kg/m².    Constitution:   Well-developed, well nourished patient in no acute distress.  Neurological:   Alert and oriented x 3 and cooperative to examination.     Psychiatric/Behavior: Normal mental status.  Respiratory:   No shortness of breath.  Nonlabored breathing  Cardiovascular:           Regular rate and rhythm  Eyes:    Extraoccular muscles intact  Skin:    No scars, rash or suspicious lesions.    Physical Exam:     Left Knee:     Grade effusion 1  Well-healed anterior scars    No erythema or increased heat     Patella demonstrated no crepitus.    Anteromedial aspect was tender on palpation. Medial aspect was tender on palpation. Medial collateral ligament was tender on palpation.    No lateral joint line tenderness   Active flexion 120 degrees   Active extension 0 degrees   antalgic gait was observed.            Assessment:     Primary osteoarthritis of left knee  -     Large Joint " Aspiration/Injection: L knee  -     Prior authorization Order    S/P left knee arthroscopy        Plan:      I have discussed exam and arthroscopic findings once again with the patient today.  She was noted to have advanced focal grade 4 changes in the medial femoral condyle medial tibial plateau.  She had pristine cartilage in the lateral compartment with only grade 1 changes in the patellofemoral compartment.  I given her a corticosteroid injection today which she tolerated the procedure well.  We have discussed viscosupplementation.  We will try to get this authorized.  I will make her a three-month follow up with Dr. Kingsley to further discuss next treatment option as she would be a good candidate for partial knee arthroplasty.  She does have a history of right medial compartment partial knee arthroplasty        Follow up in about 3 months (around 12/30/2024).    DISCLAIMER: This note may have been dictated using voice recognition software and may contain grammatical errors.

## 2024-10-12 ENCOUNTER — OFFICE VISIT (OUTPATIENT)
Dept: URGENT CARE | Facility: CLINIC | Age: 49
End: 2024-10-12
Payer: COMMERCIAL

## 2024-10-12 ENCOUNTER — TELEPHONE (OUTPATIENT)
Dept: URGENT CARE | Facility: CLINIC | Age: 49
End: 2024-10-12

## 2024-10-12 ENCOUNTER — HOSPITAL ENCOUNTER (EMERGENCY)
Facility: HOSPITAL | Age: 49
Discharge: HOME | End: 2024-10-12
Attending: EMERGENCY MEDICINE
Payer: COMMERCIAL

## 2024-10-12 ENCOUNTER — HOSPITAL ENCOUNTER (OUTPATIENT)
Dept: RADIOLOGY | Facility: HOSPITAL | Age: 49
Discharge: HOME | End: 2024-10-12
Payer: COMMERCIAL

## 2024-10-12 VITALS
DIASTOLIC BLOOD PRESSURE: 97 MMHG | HEIGHT: 66 IN | SYSTOLIC BLOOD PRESSURE: 127 MMHG | RESPIRATION RATE: 18 BRPM | TEMPERATURE: 98 F | HEART RATE: 79 BPM | BODY MASS INDEX: 36.96 KG/M2 | OXYGEN SATURATION: 100 % | WEIGHT: 230 LBS

## 2024-10-12 VITALS
TEMPERATURE: 98 F | SYSTOLIC BLOOD PRESSURE: 123 MMHG | WEIGHT: 228 LBS | RESPIRATION RATE: 18 BRPM | HEART RATE: 86 BPM | OXYGEN SATURATION: 96 % | BODY MASS INDEX: 37.99 KG/M2 | HEIGHT: 65 IN | DIASTOLIC BLOOD PRESSURE: 86 MMHG

## 2024-10-12 DIAGNOSIS — M25.562 LEFT KNEE PAIN, UNSPECIFIED CHRONICITY: Primary | ICD-10-CM

## 2024-10-12 DIAGNOSIS — M79.89 LEFT LEG SWELLING: ICD-10-CM

## 2024-10-12 DIAGNOSIS — R79.89 ELEVATED D-DIMER: Primary | ICD-10-CM

## 2024-10-12 DIAGNOSIS — M25.562 LEFT KNEE PAIN, UNSPECIFIED CHRONICITY: ICD-10-CM

## 2024-10-12 LAB — D DIMER PPP IA.FEU-MCNC: 0.54 UG/ML FEU (ref 0–0.5)

## 2024-10-12 PROCEDURE — 99213 OFFICE O/P EST LOW 20 MIN: CPT | Mod: S$PBB,,,

## 2024-10-12 PROCEDURE — 36415 COLL VENOUS BLD VENIPUNCTURE: CPT

## 2024-10-12 PROCEDURE — 99215 OFFICE O/P EST HI 40 MIN: CPT | Mod: PBBFAC,25

## 2024-10-12 PROCEDURE — 99284 EMERGENCY DEPT VISIT MOD MDM: CPT | Mod: 25,27

## 2024-10-12 PROCEDURE — 73562 X-RAY EXAM OF KNEE 3: CPT | Mod: TC,LT

## 2024-10-12 PROCEDURE — 85379 FIBRIN DEGRADATION QUANT: CPT

## 2024-10-12 NOTE — TELEPHONE ENCOUNTER
----- Message from LAURO Gregory sent at 10/12/2024  3:57 PM CDT -----  Please notify patient D-dimer is elevated, I advised patient to reports to nearest ER to obtain ultrasound to rule out DVT related to high-risk factors.

## 2024-10-12 NOTE — PROGRESS NOTES
Please notify patient D-dimer is elevated, I advised patient to reports to nearest ER to obtain ultrasound to rule out DVT related to high-risk factors.

## 2024-10-12 NOTE — PROGRESS NOTES
"Subjective:       Patient ID: Maria E Lopez is a 48 y.o. female.    Vitals:  height is 5' 5" (1.651 m) and weight is 103.4 kg (228 lb). Her oral temperature is 97.8 °F (36.6 °C). Her blood pressure is 123/86 and her pulse is 86. Her respiration is 18 and oxygen saturation is 96%.     Chief Complaint: Bleeding/Bruising (Bruising to left inner aspect of knee. Patient denies trauma or injury. Patient reports noticing bruising 1 day ago. Patient reports the pain as a sharp pain, no swelling and is not warm to touch. Bruise is 3 cm in diameter with no induration. )    Agree with CC, 47 y/o AAF with extensive ortho hx presents to clinic alone, she reports non traumatic left knee pain and bruising noted on yesterday, states bruising has improved today. Reports 2 month post op meniscectomy sx of left  knee. She admits to car travel appx 7 hours on yesterday.         Cardiovascular:  Negative for leg swelling.   Respiratory:  Negative for shortness of breath.    Musculoskeletal:  Positive for joint swelling and arthritis. Negative for trauma, joint pain and abnormal ROM of joint.   Skin:  Positive for bruising.   Hematologic/Lymphatic: Negative for easy bruising/bleeding, trouble clotting, history of blood clots and history of bleeding disorder. Does not bruise/bleed easily.       Objective:      Physical Exam   Constitutional: She is oriented to person, place, and time. She is cooperative. She is easily aroused. No distress. overweightawake  HENT:   Head: Normocephalic and atraumatic.   Abdominal: Normal appearance.   Musculoskeletal:      Left knee: She exhibits ecchymosis. Tenderness found. Medial joint line tenderness noted.        Legs:       Comments: Some purple discoloration noted to medial side of left knee, no mass palpated, no induration, no increased temperature   Neurological: She is alert, oriented to person, place, and time and easily aroused. GCS eye subscore is 4. GCS verbal subscore is 5. GCS " motor subscore is 6.   Skin: Skin is warm, dry and intact. Capillary refill takes less than 2 seconds.   Psychiatric: Her speech is normal and behavior is normal.   Nursing note and vitals reviewed.        Assessment:       1. Left knee pain, unspecified chronicity          Plan:     Differentials discussed with patient, we will obtain D-dimer, if value is elevated, we will discuss findings with patient with recommendation for ultrasound for DVT rule out.  Patient verbalizes understanding.      Left knee pain, unspecified chronicity  -     XR KNEE 3 VIEW LEFT; Future; Expected date: 10/12/2024  -     D-Dimer, Quantitative; Future; Expected date: 10/12/2024                Medical Decision Making:   Differential Diagnosis:   Contusion, hematoma, DVT, among others    PACS Images for [employer] Viewer     Show images for XR KNEE 3 VIEW LEFT  XR KNEE 3 VIEW LEFT  Order: [id number]  Status: Final result       Visible to patient: Yes (not seen)       Next appt: 10/31/2024 at 09:30 AM in Family Medicine (Bob Ontiveros MD)       Dx: Left knee pain, unspecified chronicity    0 Result Notes  Details    Reading Physician Reading Date Result Priority   Kapil Melo MD  [phone] 10/12/2024 STAT     Narrative & Impression  EXAMINATION:  XR KNEE 3 VIEW LEFT     CLINICAL HISTORY:  Pain in left knee     TECHNIQUE:  Four views     COMPARISON:  April 8, 2024     FINDINGS:  There is degenerate narrowing of the medial compartment of left knee without significant interval change.  There is also mild degenerative arthrosis involving the patellofemoral articulation.  Articular surface alignment is preserved.  No acute fracture or dislocation.  There appears some joint effusion.     Impression:     Degenerative changes without significant interval change.        Electronically signed by:Kapil Melo  Date:                                            10/12/2024  Time:                                           14:00        Exam  Ended: 10/12/24 13:48 CDT     Additional MDM:     Well's Criteria Score:  -Clinical symptoms of DVT (leg swelling, pain with palpation) = 3.0  -PE is #1 diagnosis OR equally likely =            0.0  -Heart Rate >100 =   0.0  -Immobilization (= or > than 3 days) or surgery in the previous 4 weeks = 1.5  -Previous DVT/PE = 0.0  -Hemoptysis =          0.0  -Malignancy =           0.0  Well's Probability Score =    4.5

## 2024-10-13 NOTE — DISCHARGE INSTRUCTIONS
Ultrasound imaging negative today for any blood clots.      Continue to monitor your symptoms.  Recommended that he follow up with your primary care and/or specialist if symptoms are persistent.      Return to ER as needed.   Trilobed Flap Text: The defect edges were debeveled with a #15 scalpel blade.  Given the location of the defect and the proximity to free margins a trilobed flap was deemed most appropriate.  Using a sterile surgical marker, an appropriate trilobed flap drawn around the defect.    The area thus outlined was incised deep to adipose tissue with a #15 scalpel blade.  The skin margins were undermined to an appropriate distance in all directions utilizing iris scissors.

## 2024-10-13 NOTE — ED PROVIDER NOTES
Encounter Date: 10/12/2024       History     Chief Complaint   Patient presents with    Knee Pain     Pt c/o LT knee pain and swelling. Sent by urgent care for elevated d-dimer.      See MDM for details     The history is provided by the patient.     Review of patient's allergies indicates:  No Known Allergies  Past Medical History:   Diagnosis Date    Chronic constipation     Hashimoto's thyroiditis     Hyperlipidemia     Hypertension     Hypothyroidism     Obesity     Osteoarthritis     Prediabetes     Renal disorder     autoimmune disorder due to increased levels protein    Sleep apnea     Torn meniscus      Past Surgical History:   Procedure Laterality Date    BIOPSY OF LESION      COLONOSCOPY      KNEE ARTHROSCOPY W/ MENISCECTOMY Left 8/1/2024    Procedure: ARTHROSCOPY, KNEE, WITH MENISCECTOMY;  Surgeon: Jorge A Kingsley MD;  [facility];  Service: Orthopedics;  Laterality: Left;  left knee ats    rt knee arthroscopy      TOTAL KNEE ARTHROPLASTY Right     partial     Family History   Problem Relation Name Age of Onset    Thyroid disease Mother Nohemy Pena     Diabetes Mother Nohemy Pena     Hypertension Mother Nohemy Pena     Arthritis Mother Nohemy Pena     Liver cancer Father Santiago Pena     Heart disease Father Santiago Pena     COPD Father Santiago Pena     Alcohol abuse Father Santiago Pena     Heart disease Paternal Grandfather [facility]     Cancer Paternal Grandmother [facility]     Stroke Maternal Grandfather Hood Galeana      Social History     Tobacco Use    Smoking status: Never     Passive exposure: Never    Smokeless tobacco: Never   Substance Use Topics    Alcohol use: Never    Drug use: Never     Review of Systems   Constitutional:  Negative for chills and fever.   Respiratory:  Negative for shortness of breath.    Cardiovascular:  Negative for chest pain.   Musculoskeletal:  Positive for arthralgias and joint swelling. Negative for myalgias.   Skin:   Positive for color change.   Neurological:  Negative for weakness and numbness.   All other systems reviewed and are negative.      Physical Exam     Initial Vitals [10/12/24 2002]   BP Pulse Resp Temp SpO2   137/85 78 18 97.8 °F (36.6 °C) 98 %      MAP       --         Physical Exam    Nursing note and vitals reviewed.  Constitutional: She appears well-developed and well-nourished. No distress.   HENT:   Head: Normocephalic and atraumatic.   Eyes: Conjunctivae and EOM are normal.   Neck:   Normal range of motion.  Cardiovascular:  Normal rate.           Pulmonary/Chest: No respiratory distress.   Musculoskeletal:         General: Normal range of motion.      Cervical back: Normal range of motion.      Comments: Left knee: Mild swelling present compared to right. ROM normal. Non-tender     Neurological: She is alert and oriented to person, place, and time.   Skin: Skin is warm and dry.   Bruising present to medial left knee. No warmth or induration. No color changes to left calf.   Psychiatric: She has a normal mood and affect. Thought content normal.         ED Course   Procedures  Labs Reviewed - No data to display       Imaging Results    None          Medications - No data to display  Medical Decision Making  48-year-old female presents to the ER for evaluation of elevated D-dimer.  Patient urgent care this morning for left knee pain and bruising.  The urgent care took an x-ray which was normal and carolyn a D-dimer which was elevated.  She was called with the elevated results and recommended to come to the ER for evaluation of DVT.  Patient reports that her swelling and bruising began about 3 days ago and has improved since then.  She shares that she has been just taking over-the-counter medications and resting.  She denies any calf pain.  She denies any shortness of breath or chest pain.  She shares that she did have a knee scope done few months ago with orthopedics.  She reports that they have told her that she  has a total knee replacement in the future, however, she has not scheduled this at this time.    Ultrasound imaging of the left lower extremity was ordered which was negative for any deep or superficial vein thrombosis.  I discussed this with the patient.  She has no other symptoms at this time and her swelling and knee pain is improving.  We discussed that her D-dimer could have been elevated for numerous amounts of reasons.  Patient verbalized her understanding.  She does not have any shortness of breath or signs of PE in her history or physical exam.  Recommend that she continue to monitor her symptoms and follow up with the orthopedic specialist.  Strict return to ER precautions were reviewed.      Additional MDM:   Differential Diagnosis:   Other: The following diagnoses were also considered and will be evaluated: contusion, DVT and injury.            ED Course as of 10/12/24 2259   Sat Oct 12, 2024   2220 CV US Left LE Doppler: Negative for deep and superficial vein thrombosis in the left  lower extremity.   [LM]      ED Course User Index  [LM] Eugenia Pillai PA                           Clinical Impression:  Final diagnoses:  [M79.89] Left leg swelling  [R79.89] Elevated d-dimer (Primary)          ED Disposition Condition    Discharge Stable          ED Prescriptions    None       Follow-up Information       Follow up With Specialties Details Why Contact Info    Bob Ontiveros MD Internal Medicine Schedule an appointment as soon as possible for a visit  As needed, If symptoms worsen [address]  [facility] [id number] [phone]               Eugenia Pillai PA  10/12/24 [id number]

## 2024-10-13 NOTE — FIRST PROVIDER EVALUATION
"Medical screening examination initiated.  I have conducted a focused provider triage encounter, findings are as follows:    Brief history of present illness:  48 year old female presents to ER for left knee pain and left leg swelling. Patient had left knee surgery with Dr. Kingsley on 08/01/24. She had an XR and d dimer done at urgent care today. Sent to Er to rule out DVT due to elevated d dimer.    Vitals:    10/12/24 2002   BP: 137/85   Pulse: 78   Resp: 18   Temp: 97.8 °F (36.6 °C)   TempSrc: Oral   SpO2: 98%   Weight: 104.3 kg (230 lb)   Height: 5' 6" (1.676 m)       Pertinent physical exam:  awake and alert, nad    Brief workup plan:  US    Preliminary workup initiated; this workup will be continued and followed by the physician or advanced practice provider that is assigned to the patient when roomed.  "

## 2024-10-17 ENCOUNTER — PATIENT MESSAGE (OUTPATIENT)
Dept: ORTHOPEDICS | Facility: CLINIC | Age: 49
End: 2024-10-17
Payer: COMMERCIAL

## 2024-10-18 ENCOUNTER — LAB VISIT (OUTPATIENT)
Dept: LAB | Facility: HOSPITAL | Age: 49
End: 2024-10-18
Attending: INTERNAL MEDICINE
Payer: COMMERCIAL

## 2024-10-18 DIAGNOSIS — E03.9 HYPOTHYROIDISM, UNSPECIFIED TYPE: ICD-10-CM

## 2024-10-18 DIAGNOSIS — D70.8 OTHER NEUTROPENIA: ICD-10-CM

## 2024-10-18 DIAGNOSIS — R80.1 PERSISTENT PROTEINURIA: ICD-10-CM

## 2024-10-18 DIAGNOSIS — N02.2 MEMBRANOUS NEPHROPATHY DETERMINED BY BIOPSY: ICD-10-CM

## 2024-10-18 DIAGNOSIS — R73.03 PREDIABETES: ICD-10-CM

## 2024-10-18 DIAGNOSIS — I10 HYPERTENSION, UNSPECIFIED TYPE: ICD-10-CM

## 2024-10-18 DIAGNOSIS — E78.5 HYPERLIPIDEMIA, UNSPECIFIED HYPERLIPIDEMIA TYPE: ICD-10-CM

## 2024-10-18 DIAGNOSIS — I10 PRIMARY HYPERTENSION: ICD-10-CM

## 2024-10-18 LAB
ALBUMIN SERPL-MCNC: 3.8 G/DL (ref 3.5–5)
ALBUMIN/GLOB SERPL: 1.4 RATIO (ref 1.1–2)
ALP SERPL-CCNC: 47 UNIT/L (ref 40–150)
ALT SERPL-CCNC: 23 UNIT/L (ref 0–55)
ANION GAP SERPL CALC-SCNC: 6 MEQ/L
AST SERPL-CCNC: 17 UNIT/L (ref 5–34)
BASOPHILS # BLD AUTO: 0.04 X10(3)/MCL
BASOPHILS NFR BLD AUTO: 0.9 %
BILIRUB SERPL-MCNC: 1.4 MG/DL
BUN SERPL-MCNC: 10.5 MG/DL (ref 7–18.7)
CALCIUM SERPL-MCNC: 9.3 MG/DL (ref 8.4–10.2)
CHLORIDE SERPL-SCNC: 108 MMOL/L (ref 98–107)
CHOLEST SERPL-MCNC: 150 MG/DL
CHOLEST/HDLC SERPL: 3 {RATIO} (ref 0–5)
CO2 SERPL-SCNC: 27 MMOL/L (ref 22–29)
CREAT SERPL-MCNC: 0.84 MG/DL (ref 0.55–1.02)
CREAT UR-MCNC: 154.6 MG/DL (ref 45–106)
CREAT/UREA NIT SERPL: 13
EOSINOPHIL # BLD AUTO: 0.22 X10(3)/MCL (ref 0–0.9)
EOSINOPHIL NFR BLD AUTO: 4.8 %
ERYTHROCYTE [DISTWIDTH] IN BLOOD BY AUTOMATED COUNT: 13.5 % (ref 11.5–17)
GFR SERPLBLD CREATININE-BSD FMLA CKD-EPI: >60 ML/MIN/1.73/M2
GLOBULIN SER-MCNC: 2.7 GM/DL (ref 2.4–3.5)
GLUCOSE SERPL-MCNC: 108 MG/DL (ref 74–100)
HCT VFR BLD AUTO: 37.7 % (ref 37–47)
HDLC SERPL-MCNC: 50 MG/DL (ref 35–60)
HGB BLD-MCNC: 11.9 G/DL (ref 12–16)
IMM GRANULOCYTES # BLD AUTO: 0.02 X10(3)/MCL (ref 0–0.04)
IMM GRANULOCYTES NFR BLD AUTO: 0.4 %
LDLC SERPL CALC-MCNC: 84 MG/DL (ref 50–140)
LYMPHOCYTES # BLD AUTO: 2.46 X10(3)/MCL (ref 0.6–4.6)
LYMPHOCYTES NFR BLD AUTO: 53.6 %
MCH RBC QN AUTO: 28.3 PG (ref 27–31)
MCHC RBC AUTO-ENTMCNC: 31.6 G/DL (ref 33–36)
MCV RBC AUTO: 89.5 FL (ref 80–94)
MONOCYTES # BLD AUTO: 0.3 X10(3)/MCL (ref 0.1–1.3)
MONOCYTES NFR BLD AUTO: 6.5 %
NEUTROPHILS # BLD AUTO: 1.55 X10(3)/MCL (ref 2.1–9.2)
NEUTROPHILS NFR BLD AUTO: 33.8 %
NRBC BLD AUTO-RTO: 0 %
PLATELET # BLD AUTO: 271 X10(3)/MCL (ref 130–400)
PMV BLD AUTO: 10 FL (ref 7.4–10.4)
POTASSIUM SERPL-SCNC: 4.1 MMOL/L (ref 3.5–5.1)
PROT SERPL-MCNC: 6.5 GM/DL (ref 6.4–8.3)
PROT UR STRIP-MCNC: <6.8 MG/DL
RBC # BLD AUTO: 4.21 X10(6)/MCL (ref 4.2–5.4)
SODIUM SERPL-SCNC: 141 MMOL/L (ref 136–145)
TRIGL SERPL-MCNC: 82 MG/DL (ref 37–140)
VLDLC SERPL CALC-MCNC: 16 MG/DL
WBC # BLD AUTO: 4.59 X10(3)/MCL (ref 4.5–11.5)

## 2024-10-18 PROCEDURE — 85025 COMPLETE CBC W/AUTO DIFF WBC: CPT

## 2024-10-18 PROCEDURE — 80061 LIPID PANEL: CPT

## 2024-10-18 PROCEDURE — 82172 ASSAY OF APOLIPOPROTEIN: CPT

## 2024-10-18 PROCEDURE — 80053 COMPREHEN METABOLIC PANEL: CPT

## 2024-10-18 PROCEDURE — 84156 ASSAY OF PROTEIN URINE: CPT

## 2024-10-18 PROCEDURE — 82570 ASSAY OF URINE CREATININE: CPT

## 2024-10-18 PROCEDURE — 36415 COLL VENOUS BLD VENIPUNCTURE: CPT

## 2024-10-20 LAB — APO B SERPL-MCNC: 66 MG/DL

## 2024-10-31 ENCOUNTER — OFFICE VISIT (OUTPATIENT)
Dept: FAMILY MEDICINE | Facility: CLINIC | Age: 49
End: 2024-10-31
Payer: COMMERCIAL

## 2024-10-31 VITALS
SYSTOLIC BLOOD PRESSURE: 128 MMHG | RESPIRATION RATE: 16 BRPM | WEIGHT: 227.81 LBS | HEIGHT: 66 IN | HEART RATE: 72 BPM | BODY MASS INDEX: 36.61 KG/M2 | TEMPERATURE: 99 F | OXYGEN SATURATION: 99 % | DIASTOLIC BLOOD PRESSURE: 88 MMHG

## 2024-10-31 DIAGNOSIS — M25.562 CHRONIC PAIN OF LEFT KNEE: ICD-10-CM

## 2024-10-31 DIAGNOSIS — K59.09 CHRONIC CONSTIPATION: ICD-10-CM

## 2024-10-31 DIAGNOSIS — G89.29 CHRONIC PAIN OF LEFT KNEE: ICD-10-CM

## 2024-10-31 DIAGNOSIS — E78.2 MIXED HYPERLIPIDEMIA: ICD-10-CM

## 2024-10-31 DIAGNOSIS — E03.9 ACQUIRED HYPOTHYROIDISM: ICD-10-CM

## 2024-10-31 DIAGNOSIS — N05.2 MEMBRANOUS GLOMERULONEPHRITIS: ICD-10-CM

## 2024-10-31 DIAGNOSIS — Z78.0 MENOPAUSE: Primary | ICD-10-CM

## 2024-10-31 DIAGNOSIS — I10 PRIMARY HYPERTENSION: ICD-10-CM

## 2024-10-31 DIAGNOSIS — E66.01 SEVERE OBESITY (BMI 35.0-39.9) WITH COMORBIDITY: ICD-10-CM

## 2024-10-31 DIAGNOSIS — Z23 FLU VACCINE NEED: ICD-10-CM

## 2024-10-31 PROCEDURE — 3066F NEPHROPATHY DOC TX: CPT | Mod: CPTII,,, | Performed by: INTERNAL MEDICINE

## 2024-10-31 PROCEDURE — 3008F BODY MASS INDEX DOCD: CPT | Mod: CPTII,,, | Performed by: INTERNAL MEDICINE

## 2024-10-31 PROCEDURE — 3044F HG A1C LEVEL LT 7.0%: CPT | Mod: CPTII,,, | Performed by: INTERNAL MEDICINE

## 2024-10-31 PROCEDURE — 99214 OFFICE O/P EST MOD 30 MIN: CPT | Mod: ,,, | Performed by: INTERNAL MEDICINE

## 2024-10-31 PROCEDURE — 1159F MED LIST DOCD IN RCRD: CPT | Mod: CPTII,,, | Performed by: INTERNAL MEDICINE

## 2024-10-31 PROCEDURE — 1160F RVW MEDS BY RX/DR IN RCRD: CPT | Mod: CPTII,,, | Performed by: INTERNAL MEDICINE

## 2024-10-31 PROCEDURE — 3079F DIAST BP 80-89 MM HG: CPT | Mod: CPTII,,, | Performed by: INTERNAL MEDICINE

## 2024-10-31 PROCEDURE — 3074F SYST BP LT 130 MM HG: CPT | Mod: CPTII,,, | Performed by: INTERNAL MEDICINE

## 2024-10-31 PROCEDURE — 4010F ACE/ARB THERAPY RXD/TAKEN: CPT | Mod: CPTII,,, | Performed by: INTERNAL MEDICINE

## 2024-10-31 RX ORDER — TENAPANOR HYDROCHLORIDE 53.2 MG/1
50 TABLET ORAL 2 TIMES DAILY
Qty: 60 TABLET | Refills: 11 | Status: SHIPPED | OUTPATIENT
Start: 2024-10-31

## 2024-11-14 ENCOUNTER — PATIENT MESSAGE (OUTPATIENT)
Dept: NEPHROLOGY | Facility: CLINIC | Age: 49
End: 2024-11-14
Payer: COMMERCIAL

## 2024-11-19 ENCOUNTER — LAB VISIT (OUTPATIENT)
Dept: LAB | Facility: HOSPITAL | Age: 49
End: 2024-11-19
Payer: COMMERCIAL

## 2024-11-19 DIAGNOSIS — R80.1 PERSISTENT PROTEINURIA: ICD-10-CM

## 2024-11-19 DIAGNOSIS — N02.2 MEMBRANOUS NEPHROPATHY DETERMINED BY BIOPSY: ICD-10-CM

## 2024-11-19 DIAGNOSIS — I10 PRIMARY HYPERTENSION: ICD-10-CM

## 2024-11-19 LAB
CREAT UR-MCNC: 181.3 MG/DL (ref 45–106)
PROT UR STRIP-MCNC: 8.4 MG/DL
URINE PROTEIN/CREATININE RATIO (OLG): 0

## 2024-11-19 PROCEDURE — 82570 ASSAY OF URINE CREATININE: CPT

## 2024-11-25 ENCOUNTER — HOSPITAL ENCOUNTER (OUTPATIENT)
Dept: RADIOLOGY | Facility: CLINIC | Age: 49
Discharge: HOME | End: 2024-11-25
Attending: SPECIALIST
Payer: COMMERCIAL

## 2024-11-25 ENCOUNTER — OFFICE VISIT (OUTPATIENT)
Dept: ORTHOPEDICS | Facility: CLINIC | Age: 49
End: 2024-11-25
Payer: COMMERCIAL

## 2024-11-25 VITALS
DIASTOLIC BLOOD PRESSURE: 88 MMHG | SYSTOLIC BLOOD PRESSURE: 125 MMHG | HEIGHT: 66 IN | WEIGHT: 228 LBS | HEART RATE: 87 BPM | BODY MASS INDEX: 36.64 KG/M2

## 2024-11-25 DIAGNOSIS — M17.12 PRIMARY OSTEOARTHRITIS OF LEFT KNEE: Primary | ICD-10-CM

## 2024-11-25 DIAGNOSIS — Z98.890 S/P LEFT KNEE ARTHROSCOPY: ICD-10-CM

## 2024-11-25 DIAGNOSIS — M17.12 PRIMARY OSTEOARTHRITIS OF LEFT KNEE: ICD-10-CM

## 2024-11-25 PROCEDURE — 99213 OFFICE O/P EST LOW 20 MIN: CPT | Mod: ,,, | Performed by: SPECIALIST

## 2024-11-25 PROCEDURE — 1159F MED LIST DOCD IN RCRD: CPT | Mod: CPTII,,, | Performed by: SPECIALIST

## 2024-11-25 PROCEDURE — 3074F SYST BP LT 130 MM HG: CPT | Mod: CPTII,,, | Performed by: SPECIALIST

## 2024-11-25 PROCEDURE — 3066F NEPHROPATHY DOC TX: CPT | Mod: CPTII,,, | Performed by: SPECIALIST

## 2024-11-25 PROCEDURE — 3008F BODY MASS INDEX DOCD: CPT | Mod: CPTII,,, | Performed by: SPECIALIST

## 2024-11-25 PROCEDURE — 3079F DIAST BP 80-89 MM HG: CPT | Mod: CPTII,,, | Performed by: SPECIALIST

## 2024-11-25 PROCEDURE — 3044F HG A1C LEVEL LT 7.0%: CPT | Mod: CPTII,,, | Performed by: SPECIALIST

## 2024-11-25 PROCEDURE — 4010F ACE/ARB THERAPY RXD/TAKEN: CPT | Mod: CPTII,,, | Performed by: SPECIALIST

## 2024-11-25 PROCEDURE — 73564 X-RAY EXAM KNEE 4 OR MORE: CPT | Mod: LT,,, | Performed by: SPECIALIST

## 2024-11-25 NOTE — PROGRESS NOTES
Past Medical History:   Diagnosis Date    Chronic constipation     Hashimoto's thyroiditis     Hyperlipidemia     Hypertension     Hypothyroidism     Obesity     Osteoarthritis     Prediabetes     Renal disorder     autoimmune disorder due to increased levels protein    Sleep apnea     Torn meniscus        Past Surgical History:   Procedure Laterality Date    BIOPSY OF LESION      COLONOSCOPY      JOINT REPLACEMENT  Right Knee    2021    KNEE ARTHROSCOPY W/ MENISCECTOMY Left 08/01/2024    Procedure: ARTHROSCOPY, KNEE, WITH MENISCECTOMY;  Surgeon: Jorge A Kingsley MD;  Location: [facility];  Service: Orthopedics;  Laterality: Left;  left knee ats    rt knee arthroscopy      TOTAL KNEE ARTHROPLASTY Right     partial       Current Outpatient Medications   Medication Sig    acetaminophen (TYLENOL) 500 MG tablet Take 500 mg by mouth every 6 (six) hours as needed for Pain.    IBSRELA 50 mg Tab Take 50 tablets by mouth 2 (two) times a day.    Lactobacillus rhamnosus GG (CULTURELLE) 10 billion cell capsule Take 1 capsule by mouth once daily.    levonorgestreL (LILETTA) 20.1 mcg/24 hrs (6 yrs) 52 mg IUD 52 mg by Intrauterine route.    levothyroxine (SYNTHROID) 100 MCG tablet Take 1 tablet by mouth once daily    rosuvastatin (CRESTOR) 20 MG tablet Take 1 tablet by mouth once daily    TURMERIC ORAL Take 1,000 mg by mouth once daily.    losartan (COZAAR) 50 MG tablet Take 1 tablet (50 mg total) by mouth once daily.     No current facility-administered medications for this visit.       Review of patient's allergies indicates:  No Known Allergies    Family History   Problem Relation Name Age of Onset    Thyroid disease Mother Nohemy Becky     Diabetes Mother Nohemy Pena     Hypertension Mother Nohemy Pena     Arthritis Mother Nohemy Pena     Liver cancer Father Santiago Pena     Heart disease Father Santiago Pena     COPD Father Santiago Pena     Alcohol abuse Father Santiago Pena     Heart disease Paternal  Grandfather Leonard Nichols     Cancer Paternal Grandmother Jolene Nichols     Stroke Maternal Grandfather Hood Galeana        Social History     Socioeconomic History    Marital status:    Tobacco Use    Smoking status: Never     Passive exposure: Never    Smokeless tobacco: Never   Substance and Sexual Activity    Alcohol use: Never    Drug use: Never    Sexual activity: Not Currently     Partners: Male     Birth control/protection: I.U.D., None   Social History Narrative    The patient is  and has 3 children.  She works as a .     Social Drivers of Health     Financial Resource Strain: Medium Risk (4/8/2024)    Overall Financial Resource Strain (CARDIA)     Difficulty of Paying Living Expenses: Somewhat hard   Food Insecurity: No Food Insecurity (4/8/2024)    Hunger Vital Sign     Worried About Running Out of Food in the Last Year: Never true     Ran Out of Food in the Last Year: Never true   Transportation Needs: No Transportation Needs (4/8/2024)    PRAPARE - Transportation     Lack of Transportation (Medical): No     Lack of Transportation (Non-Medical): No   Physical Activity: Sufficiently Active (4/8/2024)    Exercise Vital Sign     Days of Exercise per Week: 3 days     Minutes of Exercise per Session: 60 min   Stress: No Stress Concern Present (4/8/2024)    [employer] - Occupational Stress Questionnaire     Feeling of Stress : Only a little   Housing Stability: Unknown (4/8/2024)    Housing Stability Vital Sign     Unable to Pay for Housing in the Last Year: No     Unstable Housing in the Last Year: No       Chief Complaint:   Chief Complaint   Patient presents with    Follow-up     Left knee increased pain and bruising after cortisone injection given 9/30/24. this bruising is still present today. Pain is still present but not as bad as first couple of days. Injection did not give any relief.        Consulting Physician: No ref. provider  "found    History of present illness:    This is a 49 y.o. year old female who complains of continued pain medial compartment of the left knee.  She limps secondary to the pain.  She was noted to have full-thickness cartilage loss in the medial compartment during arthroscopy.  No relief with corticosteroid injection.    Review of Systems:    Constitution:   Denies chills, fever, and sweats.  HENT:   Denies headaches or blurry vision.  Cardiovascular:  Denies chest pain or irregular heart beat.  Respiratory:   Denies cough or shortness of breath.  Gastrointestinal:  Denies abdominal pain, nausea, or vomiting.  Musculoskeletal:   Denies muscle cramps.  Neurological:   Denies dizziness or focal weakness.  Psychiatric/Behavior: Normal mental status.  Hematology/Lymph:  Denies bleeding problem or easy bruising/bleeding.  Skin:    Denies rash or suspicious lesions.    Examination:    Vital Signs:    Vitals:    11/25/24 0929   BP: 125/88   Pulse: 87   Weight: 103.4 kg (228 lb)   Height: 5' 6" (1.676 m)       Body mass index is 36.8 kg/m².    Constitution:   Well-developed, well nourished patient in no acute distress.  Neurological:   Alert and oriented x 3 and cooperative to examination.     Psychiatric/Behavior: Normal mental status.  Respiratory:   No shortness of breath.non labored breathing.  Cardiovascular: Regular rate and rhythm  Eyes:    Extraoccular muscles intact  Skin:    No scars, rash or suspicious lesions.    Physical Exam:     General Musculoskeletal Exam   Gait: antalgic         Left Knee Exam     Inspection   Erythema: absent  Effusion: present  Deformity: present  Bruising: absent    Tenderness   The patient tender to palpation of the condyle, MCL, medial retinaculum and medial joint line.    Crepitus   The patient has crepitus of the medial joint line.    Range of Motion   Extension: abnormal   Flexion: abnormal   0° to 130°    Tests   Meniscus   Jayce:  Medial - positive Lateral - negative  Stability "   MCL - Valgus: normal (0 to 2mm)  LCL - Varus: normal (0 to 2mm)  Patella   Passive Patellar Tilt: neutral  Pain is medial compartment with no lateral or patellofemoral compartment tenderness.    Other   Sensation: normal    Comments:  Varus deformity    Muscle Strength   Left Lower Extremity   Quadriceps:  5/5   Hamstrin/5     Vascular Exam       Left Pulses  Dorsalis Pedis:      2+  Posterior Tibial:      2+          Imaging: X-rays ordered and images interpreted today personally by me of four views of the left knee which show that she is bone-on-bone with advanced medial compartment cartilage space narrowing in the left knee.  Grade 4 changes and sclerosis in the medial compartment of the left knee.  Mild varus deformity.  Medial osteophytes.  Impression: Advanced osteoarthrosis medial compartment left knee and stable well-aligned medial compartment partial knee arthroplasty of the right knee.     Assessment: Primary osteoarthritis of left knee  -     X-Ray Knee Complete 4 or More Views Left; Future; Expected date: 2024    S/P left knee arthroscopy        Plan:  Robotic assisted left knee medial compartment partial knee arthroplasty--same-day discharge    Risks, benefits, alternatives, and complications were explained to the patient and/or patient representative. They understand, agree, and want to proceed with the operation/ procedure.        DISCLAIMER: This note may have been dictated using voice recognition software and may contain grammatical errors.     NOTE: Consult report sent to referring provider via [employer].

## 2024-12-02 DIAGNOSIS — I10 HYPERTENSION, UNSPECIFIED TYPE: ICD-10-CM

## 2024-12-02 RX ORDER — LOSARTAN POTASSIUM 50 MG/1
50 TABLET ORAL DAILY
Qty: 30 TABLET | Refills: 11 | Status: SHIPPED | OUTPATIENT
Start: 2024-12-02 | End: 2025-12-02

## 2025-01-07 ENCOUNTER — LAB VISIT (OUTPATIENT)
Dept: LAB | Facility: HOSPITAL | Age: 50
End: 2025-01-07
Payer: COMMERCIAL

## 2025-01-07 DIAGNOSIS — I10 PRIMARY HYPERTENSION: ICD-10-CM

## 2025-01-07 DIAGNOSIS — N02.2 MEMBRANOUS NEPHROPATHY DETERMINED BY BIOPSY: ICD-10-CM

## 2025-01-07 DIAGNOSIS — R80.1 PERSISTENT PROTEINURIA: ICD-10-CM

## 2025-01-07 LAB
ALBUMIN SERPL-MCNC: 4.2 G/DL (ref 3.5–5)
ALBUMIN/GLOB SERPL: 1.4 RATIO (ref 1.1–2)
ALP SERPL-CCNC: 43 UNIT/L (ref 40–150)
ALT SERPL-CCNC: 14 UNIT/L (ref 0–55)
ANION GAP SERPL CALC-SCNC: 8 MEQ/L
AST SERPL-CCNC: 16 UNIT/L (ref 5–34)
BASOPHILS # BLD AUTO: 0.03 X10(3)/MCL
BASOPHILS NFR BLD AUTO: 0.7 %
BILIRUB SERPL-MCNC: 1.4 MG/DL
BUN SERPL-MCNC: 11.3 MG/DL (ref 9.8–20.1)
CALCIUM SERPL-MCNC: 9.5 MG/DL (ref 8.4–10.2)
CHLORIDE SERPL-SCNC: 107 MMOL/L (ref 98–107)
CO2 SERPL-SCNC: 24 MMOL/L (ref 22–29)
CREAT SERPL-MCNC: 0.82 MG/DL (ref 0.55–1.02)
CREAT UR-MCNC: 189.1 MG/DL (ref 45–106)
CREAT/UREA NIT SERPL: 14
EOSINOPHIL # BLD AUTO: 0.12 X10(3)/MCL (ref 0–0.9)
EOSINOPHIL NFR BLD AUTO: 2.6 %
ERYTHROCYTE [DISTWIDTH] IN BLOOD BY AUTOMATED COUNT: 13.2 % (ref 11.5–17)
GFR SERPLBLD CREATININE-BSD FMLA CKD-EPI: >60 ML/MIN/1.73/M2
GLOBULIN SER-MCNC: 2.9 GM/DL (ref 2.4–3.5)
GLUCOSE SERPL-MCNC: 89 MG/DL (ref 74–100)
HCT VFR BLD AUTO: 39.7 % (ref 37–47)
HGB BLD-MCNC: 12.6 G/DL (ref 12–16)
IMM GRANULOCYTES # BLD AUTO: 0 X10(3)/MCL (ref 0–0.04)
IMM GRANULOCYTES NFR BLD AUTO: 0 %
LYMPHOCYTES # BLD AUTO: 2.65 X10(3)/MCL (ref 0.6–4.6)
LYMPHOCYTES NFR BLD AUTO: 58.1 %
MCH RBC QN AUTO: 27.9 PG (ref 27–31)
MCHC RBC AUTO-ENTMCNC: 31.7 G/DL (ref 33–36)
MCV RBC AUTO: 88 FL (ref 80–94)
MONOCYTES # BLD AUTO: 0.27 X10(3)/MCL (ref 0.1–1.3)
MONOCYTES NFR BLD AUTO: 5.9 %
NEUTROPHILS # BLD AUTO: 1.49 X10(3)/MCL (ref 2.1–9.2)
NEUTROPHILS NFR BLD AUTO: 32.7 %
NRBC BLD AUTO-RTO: 0 %
PLATELET # BLD AUTO: 281 X10(3)/MCL (ref 130–400)
PMV BLD AUTO: 10.6 FL (ref 7.4–10.4)
POTASSIUM SERPL-SCNC: 4.1 MMOL/L (ref 3.5–5.1)
PROT SERPL-MCNC: 7.1 GM/DL (ref 6.4–8.3)
PROT UR STRIP-MCNC: 7.9 MG/DL
RBC # BLD AUTO: 4.51 X10(6)/MCL (ref 4.2–5.4)
SODIUM SERPL-SCNC: 139 MMOL/L (ref 136–145)
URINE PROTEIN/CREATININE RATIO (OLG): 0
WBC # BLD AUTO: 4.56 X10(3)/MCL (ref 4.5–11.5)

## 2025-01-07 PROCEDURE — 85025 COMPLETE CBC W/AUTO DIFF WBC: CPT

## 2025-01-07 PROCEDURE — 82570 ASSAY OF URINE CREATININE: CPT

## 2025-01-07 PROCEDURE — 36415 COLL VENOUS BLD VENIPUNCTURE: CPT

## 2025-01-07 PROCEDURE — 80053 COMPREHEN METABOLIC PANEL: CPT

## 2025-01-09 ENCOUNTER — OFFICE VISIT (OUTPATIENT)
Dept: NEPHROLOGY | Facility: CLINIC | Age: 50
End: 2025-01-09
Payer: COMMERCIAL

## 2025-01-09 VITALS
SYSTOLIC BLOOD PRESSURE: 122 MMHG | TEMPERATURE: 98 F | HEIGHT: 66 IN | DIASTOLIC BLOOD PRESSURE: 80 MMHG | HEART RATE: 84 BPM | OXYGEN SATURATION: 95 % | WEIGHT: 218.19 LBS | BODY MASS INDEX: 35.07 KG/M2 | RESPIRATION RATE: 20 BRPM

## 2025-01-09 DIAGNOSIS — R80.1 PERSISTENT PROTEINURIA: ICD-10-CM

## 2025-01-09 DIAGNOSIS — N02.2 MEMBRANOUS NEPHROPATHY DETERMINED BY BIOPSY: Primary | ICD-10-CM

## 2025-01-09 DIAGNOSIS — I10 PRIMARY HYPERTENSION: ICD-10-CM

## 2025-01-09 PROCEDURE — 3079F DIAST BP 80-89 MM HG: CPT | Mod: CPTII,S$GLB,, | Performed by: NURSE PRACTITIONER

## 2025-01-09 PROCEDURE — 3008F BODY MASS INDEX DOCD: CPT | Mod: CPTII,S$GLB,, | Performed by: NURSE PRACTITIONER

## 2025-01-09 PROCEDURE — 4010F ACE/ARB THERAPY RXD/TAKEN: CPT | Mod: CPTII,S$GLB,, | Performed by: NURSE PRACTITIONER

## 2025-01-09 PROCEDURE — 3066F NEPHROPATHY DOC TX: CPT | Mod: CPTII,S$GLB,, | Performed by: NURSE PRACTITIONER

## 2025-01-09 PROCEDURE — 3074F SYST BP LT 130 MM HG: CPT | Mod: CPTII,S$GLB,, | Performed by: NURSE PRACTITIONER

## 2025-01-09 PROCEDURE — 99214 OFFICE O/P EST MOD 30 MIN: CPT | Mod: S$GLB,,, | Performed by: NURSE PRACTITIONER

## 2025-01-09 PROCEDURE — 99999 PR PBB SHADOW E&M-EST. PATIENT-LVL IV: CPT | Mod: PBBFAC,,, | Performed by: NURSE PRACTITIONER

## 2025-01-09 PROCEDURE — 1159F MED LIST DOCD IN RCRD: CPT | Mod: CPTII,S$GLB,, | Performed by: NURSE PRACTITIONER

## 2025-01-09 NOTE — PROGRESS NOTES
[facility] Progress Note      HPI  Maria E Lopez, 49 y.o. female, presents to office for a follow up visit for membranous nephropathy. Patient has responded  to steroids and CellCept. Cellcept stopped at last visit. She has upcoming surgical repair of her knee. She is followed by Dr. Riggins for RA. She is currently not on any medication for that. Complains of neck and back pain. Of note she is maintained on ARB.     Patient denies taking NSAIDs or new antibiotics. Also denies recent episode of dehydration, diarrhea, vomiting, acute illness, hospitalization, recent angiograms or exposure to IV radiocontrast.        Medical Diagnoses:   Past Medical History:   Diagnosis Date    Chronic constipation     Hashimoto's thyroiditis     Hyperlipidemia     Hypertension     Hypothyroidism     Obesity     Osteoarthritis     Prediabetes     Renal disorder     autoimmune disorder due to increased levels protein    Sleep apnea     Torn meniscus      Patient Active Problem List   Diagnosis    Acquired hypothyroidism    Chronic constipation    Severe obesity (BMI 35.0-39.9) with comorbidity    Prediabetes    Microscopic hematuria    Osteoarthritis of right knee    Hypertension    Hyperlipidemia    Persistent proteinuria    Drug-induced immunodeficiency    Autoimmune disease    Membranous glomerulonephritis    Membranous nephropathy determined by biopsy       Surgical History:   Past Surgical History:   Procedure Laterality Date    BIOPSY OF LESION      COLONOSCOPY      JOINT REPLACEMENT  Right Knee    2021    KNEE ARTHROSCOPY W/ MENISCECTOMY Left 08/01/2024    Procedure: ARTHROSCOPY, KNEE, WITH MENISCECTOMY;  Surgeon: Jorge A Kingsley MD;  Location: [facility];  Service: Orthopedics;  Laterality: Left;  left knee ats    rt knee arthroscopy      TOTAL KNEE ARTHROPLASTY Right     partial       Family History:   Family History   Problem Relation Name Age of Onset    Thyroid disease Mother Nohemy Pena      Diabetes Mother Nohemy Pena     Hypertension Mother Nohemy Pena     Arthritis Mother Nohemy Pena     Liver cancer Father Santiago Pena     Heart disease Father Santiago Pena     COPD Father Santiago Pena     Alcohol abuse Father Santiago Pena     Heart disease Paternal Grandfather Leonard Nichols     Cancer Paternal Grandmother Jolene Nichols     Stroke Maternal Grandfather Hood Galeana        Social History:   Social History     Tobacco Use    Smoking status: Never     Passive exposure: Never    Smokeless tobacco: Never   Substance Use Topics    Alcohol use: Never       Allergies:  Review of patient's allergies indicates:  No Known Allergies    Medications:  Outpatient Medications Marked as Taking for the 1/9/25 encounter (Office Visit) with Carola Reyna ACNP   Medication Sig Dispense Refill    acetaminophen (TYLENOL) 500 MG tablet Take 500 mg by mouth every 6 (six) hours as needed for Pain.      Lactobacillus rhamnosus GG (CULTURELLE) 10 billion cell capsule Take 1 capsule by mouth once daily.      levonorgestreL (LILETTA) 20.1 mcg/24 hrs (6 yrs) 52 mg IUD 52 mg by Intrauterine route.      levothyroxine (SYNTHROID) 100 MCG tablet Take 1 tablet by mouth once daily 90 tablet 3    losartan (COZAAR) 50 MG tablet Take 1 tablet (50 mg total) by mouth once daily. 30 tablet 11    rosuvastatin (CRESTOR) 20 MG tablet Take 1 tablet by mouth once daily 90 tablet 1         Review of Systems:    Constitutional: Denies fever, fatigue, generalized weakness  Skin: Denies wounds, no rashes, no itching, no new skin lesions  Respiratory:  Denies cough, shortness of breath, or wheezing  Cardiovascular: Denies chest pain, palpitations, or swelling  Gastrointestional: Denies abdominal pain, nausea, vomiting, diarrhea, or constipation  Genitourinary: Denies dysuria, hematuria, foamy urine, or incontinence; reports able to empty bladder  Musculoskeletal: +knee, back pain  Neurological: Denies headaches,  "dizziness, paresthesias, tremors or focal weakness      Vital Signs:  /80 (BP Location: Right arm, Patient Position: Sitting)   Pulse 84   Temp 97.7 °F (36.5 °C) (Temporal)   Resp 20   Ht 5' 5.98" (1.676 m)   Wt 99 kg (218 lb 3.2 oz)   SpO2 95%   BMI 35.24 kg/m²   Body mass index is 35.24 kg/m².      Physical Exam:    General: no acute distress, awake, alert  Eyes: conjunctiva clear, eyelids without swelling  HENT: atraumatic, oropharynx and nasal mucosa patent  Neck: supple, trache midline, no JVD  Respiratory: equal, unlabored, clear to auscultation A/P  Cardiovascular: RRR without murmur or rub  Edema: none  Gastrointestinal: soft, non-tender, non-distended  Musculoskeletal:  Left knee pain remains after surgery  Integumentary: warm, dry; no rashes, wounds, or skin lesions  Neurological: oriented x4, appropriate, no acute deficits; no asterixis      Labs:        Component Value Date/Time     01/07/2025 1036     10/18/2024 0822     09/20/2016 0805     06/16/2015 1016    K 4.1 01/07/2025 1036    K 4.1 10/18/2024 0822    K 4.3 09/20/2016 0805    K 3.9 06/16/2015 1016     01/07/2025 1036     (H) 10/18/2024 0822     09/20/2016 0805     06/16/2015 1016    CO2 24 01/07/2025 1036    CO2 27 10/18/2024 0822    CO2 25 09/20/2016 0805    CO2 24 06/16/2015 1016    BUN 11.3 01/07/2025 1036    BUN 10.5 10/18/2024 0822    BUN 7 09/20/2016 0805    BUN 6 06/16/2015 1016    CREATININE 0.82 01/07/2025 1036    CREATININE 0.84 10/18/2024 0822    CREATININE 0.86 09/16/2024 1125    CREATININE 0.79 07/08/2024 1440    CREATININE 1.0 09/20/2016 0805    CREATININE 0.9 06/16/2015 1016    CREATININE 0.9 11/28/2012 0805    CREATININE 0.7 07/22/2010 0805    CALCIUM 9.5 01/07/2025 1036    CALCIUM 9.3 10/18/2024 0822    CALCIUM 9.0 09/20/2016 0805    CALCIUM 9.2 06/16/2015 1016    PHOS 3.7 12/12/2023 1440    PHOS 4.4 07/06/2023 0814           Component Value Date/Time    WBC 4.56 " 01/07/2025 1036    WBC 4.59 10/18/2024 0822    WBC 4.50 09/20/2016 0805    WBC 4.54 06/16/2015 1016    HGB 12.6 01/07/2025 1036    HGB 11.9 (L) 10/18/2024 0822    HGB 12.6 09/20/2016 0805    HGB 13.0 06/16/2015 1016    HCT 39.7 01/07/2025 1036    HCT 37.7 10/18/2024 0822    HCT 39.9 09/20/2016 0805    HCT 39.2 06/16/2015 1016     01/07/2025 1036     10/18/2024 0822     09/20/2016 0805     06/16/2015 1016       Urine Creatinine   Date Value Ref Range Status   01/07/2025 189.1 (H) 45.0 - 106.0 mg/dL Final   11/19/2024 181.3 (H) 45.0 - 106.0 mg/dL Final       Urine Protein Level   Date Value Ref Range Status   01/07/2025 7.9 mg/dL Final   11/19/2024 8.4 mg/dL Final         Impression:  1. Primary hypertension    2. Persistent proteinuria    3. Membranous nephropathy determined by biopsy      Biopsy-proven membranous nephropathy post treatment with steroids and CellCept.  No proteinuria maintained on ARB.  BP at goal.            Plan:  Proteinuria remains controlled.  We will add urinalysis and BMP in 1 month  Encouraged adequate water intake especially on ARB.  Follow up in 3 months if labs stable.      Carola Reyna [facility]        This note was created with the assistance of [employer]ien voice recognition software or phone dictation. There may be transcription errors as a result of using this technology however minimal. Effort has been made to assure accuracy of transcription but any obvious errors or omissions should be clarified with the author of the document.

## 2025-01-31 DIAGNOSIS — M17.12 PRIMARY OSTEOARTHRITIS OF LEFT KNEE: Primary | ICD-10-CM

## 2025-01-31 DIAGNOSIS — Z01.812 PRE-OPERATIVE LABORATORY EXAMINATION: ICD-10-CM

## 2025-01-31 DIAGNOSIS — R26.89 IMPAIRED GAIT AND MOBILITY: ICD-10-CM

## 2025-01-31 RX ORDER — TRANEXAMIC ACID 650 MG/1
1950 TABLET ORAL
OUTPATIENT
Start: 2025-01-31 | End: 2025-01-31

## 2025-01-31 RX ORDER — ONDANSETRON 4 MG/1
4 TABLET, ORALLY DISINTEGRATING ORAL
OUTPATIENT
Start: 2025-01-31

## 2025-01-31 RX ORDER — ACETAMINOPHEN 500 MG
1000 TABLET ORAL
OUTPATIENT
Start: 2025-01-31

## 2025-01-31 RX ORDER — TAMSULOSIN HYDROCHLORIDE 0.4 MG/1
0.4 CAPSULE ORAL
OUTPATIENT
Start: 2025-01-31

## 2025-01-31 RX ORDER — SODIUM CHLORIDE, SODIUM GLUCONATE, SODIUM ACETATE, POTASSIUM CHLORIDE AND MAGNESIUM CHLORIDE 30; 37; 368; 526; 502 MG/100ML; MG/100ML; MG/100ML; MG/100ML; MG/100ML
INJECTION, SOLUTION INTRAVENOUS CONTINUOUS
OUTPATIENT
Start: 2025-01-31

## 2025-01-31 RX ORDER — SCOLOPAMINE TRANSDERMAL SYSTEM 1 MG/1
1 PATCH, EXTENDED RELEASE TRANSDERMAL ONCE AS NEEDED
OUTPATIENT
Start: 2025-01-31 | End: 2036-06-29

## 2025-01-31 RX ORDER — KETOROLAC TROMETHAMINE 10 MG/1
10 TABLET, FILM COATED ORAL
OUTPATIENT
Start: 2025-01-31 | End: 2025-01-31

## 2025-01-31 RX ORDER — GABAPENTIN 100 MG/1
300 CAPSULE ORAL
OUTPATIENT
Start: 2025-01-31

## 2025-02-03 ENCOUNTER — HOSPITAL ENCOUNTER (OUTPATIENT)
Dept: RADIOLOGY | Facility: HOSPITAL | Age: 50
Discharge: HOME | End: 2025-02-03
Attending: PHYSICIAN ASSISTANT
Payer: COMMERCIAL

## 2025-02-03 ENCOUNTER — HOSPITAL ENCOUNTER (OUTPATIENT)
Dept: RADIOLOGY | Facility: HOSPITAL | Age: 50
Discharge: HOME | End: 2025-02-03
Attending: NURSE PRACTITIONER
Payer: COMMERCIAL

## 2025-02-03 ENCOUNTER — CLINICAL SUPPORT (OUTPATIENT)
Dept: LAB | Facility: HOSPITAL | Age: 50
End: 2025-02-03
Attending: PHYSICIAN ASSISTANT
Payer: COMMERCIAL

## 2025-02-03 ENCOUNTER — OFFICE VISIT (OUTPATIENT)
Dept: ORTHOPEDICS | Facility: CLINIC | Age: 50
End: 2025-02-03
Payer: COMMERCIAL

## 2025-02-03 VITALS
DIASTOLIC BLOOD PRESSURE: 87 MMHG | BODY MASS INDEX: 36.52 KG/M2 | SYSTOLIC BLOOD PRESSURE: 126 MMHG | HEART RATE: 72 BPM | WEIGHT: 219.19 LBS | HEIGHT: 65 IN

## 2025-02-03 DIAGNOSIS — Z01.812 PRE-OPERATIVE LABORATORY EXAMINATION: ICD-10-CM

## 2025-02-03 DIAGNOSIS — R26.89 IMPAIRED GAIT AND MOBILITY: ICD-10-CM

## 2025-02-03 DIAGNOSIS — M17.12 PRIMARY OSTEOARTHRITIS OF LEFT KNEE: Primary | ICD-10-CM

## 2025-02-03 DIAGNOSIS — M17.12 PRIMARY OSTEOARTHRITIS OF LEFT KNEE: ICD-10-CM

## 2025-02-03 DIAGNOSIS — Z98.890 S/P LEFT KNEE ARTHROSCOPY: ICD-10-CM

## 2025-02-03 LAB
MRSA PCR SCRN (OHS): NOT DETECTED
OHS QRS DURATION: 90 MS
OHS QTC CALCULATION: 428 MS

## 2025-02-03 PROCEDURE — 71046 X-RAY EXAM CHEST 2 VIEWS: CPT | Mod: TC

## 2025-02-03 PROCEDURE — 4010F ACE/ARB THERAPY RXD/TAKEN: CPT | Mod: CPTII,,, | Performed by: PHYSICIAN ASSISTANT

## 2025-02-03 PROCEDURE — 3074F SYST BP LT 130 MM HG: CPT | Mod: CPTII,,, | Performed by: PHYSICIAN ASSISTANT

## 2025-02-03 PROCEDURE — 73700 CT LOWER EXTREMITY W/O DYE: CPT | Mod: TC,LT

## 2025-02-03 PROCEDURE — 3066F NEPHROPATHY DOC TX: CPT | Mod: CPTII,,, | Performed by: PHYSICIAN ASSISTANT

## 2025-02-03 PROCEDURE — 3008F BODY MASS INDEX DOCD: CPT | Mod: CPTII,,, | Performed by: PHYSICIAN ASSISTANT

## 2025-02-03 PROCEDURE — 1160F RVW MEDS BY RX/DR IN RCRD: CPT | Mod: CPTII,,, | Performed by: PHYSICIAN ASSISTANT

## 2025-02-03 PROCEDURE — 99214 OFFICE O/P EST MOD 30 MIN: CPT | Mod: ,,, | Performed by: PHYSICIAN ASSISTANT

## 2025-02-03 PROCEDURE — 93005 ELECTROCARDIOGRAM TRACING: CPT

## 2025-02-03 PROCEDURE — 3079F DIAST BP 80-89 MM HG: CPT | Mod: CPTII,,, | Performed by: PHYSICIAN ASSISTANT

## 2025-02-03 PROCEDURE — 1159F MED LIST DOCD IN RCRD: CPT | Mod: CPTII,,, | Performed by: PHYSICIAN ASSISTANT

## 2025-02-03 PROCEDURE — 93010 ELECTROCARDIOGRAM REPORT: CPT | Mod: ,,, | Performed by: STUDENT IN AN ORGANIZED HEALTH CARE EDUCATION/TRAINING PROGRAM

## 2025-02-03 RX ORDER — POLYETHYLENE GLYCOL 3350 17 G/17G
17 POWDER, FOR SOLUTION ORAL DAILY
Qty: 14 PACKET | Refills: 0 | Status: SHIPPED | OUTPATIENT
Start: 2025-02-03 | End: 2025-02-17

## 2025-02-03 RX ORDER — HYDROCODONE BITARTRATE AND ACETAMINOPHEN 7.5; 325 MG/1; MG/1
1 TABLET ORAL EVERY 4 HOURS PRN
Qty: 42 TABLET | Refills: 0 | Status: SHIPPED | OUTPATIENT
Start: 2025-02-03 | End: 2025-02-10

## 2025-02-03 RX ORDER — NAPROXEN SODIUM 220 MG/1
81 TABLET, FILM COATED ORAL 2 TIMES DAILY
Qty: 84 TABLET | Refills: 0 | Status: SHIPPED | OUTPATIENT
Start: 2025-02-03 | End: 2025-03-17

## 2025-02-03 RX ORDER — METHOCARBAMOL 750 MG/1
750 TABLET, FILM COATED ORAL 3 TIMES DAILY
Qty: 21 TABLET | Refills: 0 | Status: SHIPPED | OUTPATIENT
Start: 2025-02-03 | End: 2025-02-10

## 2025-02-03 NOTE — H&P
Past Medical History:   Diagnosis Date    Chronic constipation     Hashimoto's thyroiditis     Hyperlipidemia     Hypertension     Hypothyroidism     Obesity     Osteoarthritis     Prediabetes     Renal disorder     autoimmune disorder due to increased levels protein    Sleep apnea     Torn meniscus        Past Surgical History:   Procedure Laterality Date    BIOPSY OF LESION      COLONOSCOPY      JOINT REPLACEMENT  Right Knee    2021    KNEE ARTHROSCOPY W/ MENISCECTOMY Left 08/01/2024    Procedure: ARTHROSCOPY, KNEE, WITH MENISCECTOMY;  Surgeon: Jorge A Kingsley MD;  Location: [facility];  Service: Orthopedics;  Laterality: Left;  left knee ats    rt knee arthroscopy      TOTAL KNEE ARTHROPLASTY Right     partial       Current Outpatient Medications   Medication Sig    acetaminophen (TYLENOL) 500 MG tablet Take 500 mg by mouth every 6 (six) hours as needed for Pain.    levonorgestreL (LILETTA) 20.1 mcg/24 hrs (6 yrs) 52 mg IUD 52 mg by Intrauterine route.    levothyroxine (SYNTHROID) 100 MCG tablet Take 1 tablet by mouth once daily    losartan (COZAAR) 50 MG tablet Take 1 tablet (50 mg total) by mouth once daily.    rosuvastatin (CRESTOR) 20 MG tablet Take 1 tablet by mouth once daily    Lactobacillus rhamnosus GG (CULTURELLE) 10 billion cell capsule Take 1 capsule by mouth once daily. (Patient not taking: Reported on 2/3/2025)     No current facility-administered medications for this visit.       Review of patient's allergies indicates:  No Known Allergies    Family History   Problem Relation Name Age of Onset    Thyroid disease Mother Nohemy McMaria G     Diabetes Mother Nohemy McMaria G     Hypertension Mother Nohemy GaoMaria G     Arthritis Mother Nohemy GaoMaria G     Liver cancer Father Santiago Pena     Heart disease Father Santiago Pena     COPD Father Santiago Pena     Alcohol abuse Father Santiago Pena     Heart disease Paternal Grandfather Colier [place]     Cancer Paternal Grandmother Jolene  [place]     Stroke Maternal Grandfather Hood Galeana        Social History     Socioeconomic History    Marital status:    Tobacco Use    Smoking status: Never     Passive exposure: Never    Smokeless tobacco: Never   Substance and Sexual Activity    Alcohol use: Never    Drug use: Never    Sexual activity: Not Currently     Partners: Male     Birth control/protection: I.U.D., None   Social History Narrative    The patient is  and has 3 children.  She works as a .     Social Drivers of Health     Financial Resource Strain: Medium Risk (4/8/2024)    Overall Financial Resource Strain (CARDIA)     Difficulty of Paying Living Expenses: Somewhat hard   Food Insecurity: No Food Insecurity (4/8/2024)    Hunger Vital Sign     Worried About Running Out of Food in the Last Year: Never true     Ran Out of Food in the Last Year: Never true   Transportation Needs: No Transportation Needs (4/8/2024)    PRAPARE - Transportation     Lack of Transportation (Medical): No     Lack of Transportation (Non-Medical): No   Physical Activity: Sufficiently Active (4/8/2024)    Exercise Vital Sign     Days of Exercise per Week: 3 days     Minutes of Exercise per Session: 60 min   Stress: No Stress Concern Present (4/8/2024)    [employer] - Occupational Stress Questionnaire     Feeling of Stress : Only a little   Housing Stability: Unknown (4/8/2024)    Housing Stability Vital Sign     Unable to Pay for Housing in the Last Year: No     Unstable Housing in the Last Year: No       Chief Complaint:   Chief Complaint   Patient presents with    Pre-op Exam     L PKA sx 2/25/25        Consulting Physician: No ref. provider found    History of present illness:    This is a 49 y.o. year old female who presents today for preop evaluation for robotic assisted left medial compartment partial knee arthroplasty on February 25th, same-day discharge.  No new complaints or concerns today.  She has a  "known history of osteoarthritis of the medial compartment of the left knee.  She limps secondary to the pain.  She was noted to have full-thickness cartilage loss in the medial compartment during arthroscopy.  No relief with corticosteroid injection.  No relief with therapy.  No relief with medications.    Review of Systems:    Constitution:   Denies chills, fever, and sweats.  HENT:   Denies headaches or blurry vision.  Cardiovascular:  Denies chest pain or irregular heart beat.  Respiratory:   Denies cough or shortness of breath.  Gastrointestinal:  Denies abdominal pain, nausea, or vomiting.  Musculoskeletal:   Denies muscle cramps.  Neurological:   Denies dizziness or focal weakness.  Psychiatric/Behavior: Normal mental status.  Hematology/Lymph:  Denies bleeding problem or easy bruising/bleeding.  Skin:    Denies rash or suspicious lesions.    Examination:    Vital Signs:    Vitals:    02/03/25 0815   BP: 126/87   Pulse: 72   Weight: 99.4 kg (219 lb 3.2 oz)   Height: 5' 5" (1.651 m)       Body mass index is 36.48 kg/m².    Constitution:   Well-developed, well nourished patient in no acute distress.  Neurological:   Alert and oriented x 3 and cooperative to examination.     Psychiatric/Behavior: Normal mental status.  Respiratory:   No shortness of breath.non labored breathing.  Cardiovascular: Regular rate and rhythm  Eyes:    Extraoccular muscles intact  Skin:    No scars, rash or suspicious lesions.    Physical Exam:     General Musculoskeletal Exam   Gait: antalgic         Left Knee Exam     Inspection   Erythema: absent  Effusion: present  Deformity: present  Bruising: absent    Tenderness   The patient tender to palpation of the condyle, MCL, medial retinaculum and medial joint line.    Crepitus   The patient has crepitus of the medial joint line.    Range of Motion   Extension: abnormal   Flexion: abnormal   0° to 130°    Tests   Meniscus   Jayce:  Medial - positive Lateral - negative  Stability   MCL " - Valgus: normal (0 to 2mm)  LCL - Varus: normal (0 to 2mm)  Patella   Passive Patellar Tilt: neutral  Pain is medial compartment with no lateral or patellofemoral compartment tenderness.    Other   Sensation: normal    Comments:  Varus deformity    Muscle Strength   Left Lower Extremity   Quadriceps:  5/5   Hamstrin/5     Vascular Exam       Left Pulses  Dorsalis Pedis:      2+  Posterior Tibial:      2+          Imaging: X-rays reviewed of the left knee which show that she is bone-on-bone with advanced medial compartment cartilage space narrowing in the left knee.  Grade 4 changes and sclerosis in the medial compartment of the left knee.  Mild varus deformity.  Medial osteophytes.  Impression: Advanced osteoarthrosis medial compartment left knee and stable well-aligned medial compartment partial knee arthroplasty of the right knee.     Assessment: Primary osteoarthritis of left knee    Impaired gait and mobility    Pre-operative laboratory examination        Plan:  Robotic assisted left knee medial compartment partial knee arthroplasty--same-day discharge    Risks, benefits, alternatives, and complications were explained to the patient and/or patient representative. They understand, agree, and want to proceed with the operation/ procedure.        DISCLAIMER: This note may have been dictated using voice recognition software and may contain grammatical errors.     NOTE: Consult report sent to referring provider via [employer].

## 2025-02-03 NOTE — H&P (VIEW-ONLY)
Past Medical History:   Diagnosis Date    Chronic constipation     Hashimoto's thyroiditis     Hyperlipidemia     Hypertension     Hypothyroidism     Obesity     Osteoarthritis     Prediabetes     Renal disorder     autoimmune disorder due to increased levels protein    Sleep apnea     Torn meniscus        Past Surgical History:   Procedure Laterality Date    BIOPSY OF LESION      COLONOSCOPY      JOINT REPLACEMENT  Right Knee    2021    KNEE ARTHROSCOPY W/ MENISCECTOMY Left 08/01/2024    Procedure: ARTHROSCOPY, KNEE, WITH MENISCECTOMY;  Surgeon: Jorge A Kingsley MD;  Location: [facility];  Service: Orthopedics;  Laterality: Left;  left knee ats    rt knee arthroscopy      TOTAL KNEE ARTHROPLASTY Right     partial       Current Outpatient Medications   Medication Sig    acetaminophen (TYLENOL) 500 MG tablet Take 500 mg by mouth every 6 (six) hours as needed for Pain.    levonorgestreL (LILETTA) 20.1 mcg/24 hrs (6 yrs) 52 mg IUD 52 mg by Intrauterine route.    levothyroxine (SYNTHROID) 100 MCG tablet Take 1 tablet by mouth once daily    losartan (COZAAR) 50 MG tablet Take 1 tablet (50 mg total) by mouth once daily.    rosuvastatin (CRESTOR) 20 MG tablet Take 1 tablet by mouth once daily    Lactobacillus rhamnosus GG (CULTURELLE) 10 billion cell capsule Take 1 capsule by mouth once daily. (Patient not taking: Reported on 2/3/2025)     No current facility-administered medications for this visit.       Review of patient's allergies indicates:  No Known Allergies    Family History   Problem Relation Name Age of Onset    Thyroid disease Mother Nohemy McMaria G     Diabetes Mother Nohemy McMaria G     Hypertension Mother Nohemy GaoMaria G     Arthritis Mother Nohemy GaoMaria G     Liver cancer Father Santiago Pena     Heart disease Father Santiago Pena     COPD Father Santiago Pena     Alcohol abuse Father Santiago Pena     Heart disease Paternal Grandfather Colier [place]     Cancer Paternal Grandmother Jolene  [place]     Stroke Maternal Grandfather Hood Galeana        Social History     Socioeconomic History    Marital status:    Tobacco Use    Smoking status: Never     Passive exposure: Never    Smokeless tobacco: Never   Substance and Sexual Activity    Alcohol use: Never    Drug use: Never    Sexual activity: Not Currently     Partners: Male     Birth control/protection: I.U.D., None   Social History Narrative    The patient is  and has 3 children.  She works as a .     Social Drivers of Health     Financial Resource Strain: Medium Risk (4/8/2024)    Overall Financial Resource Strain (CARDIA)     Difficulty of Paying Living Expenses: Somewhat hard   Food Insecurity: No Food Insecurity (4/8/2024)    Hunger Vital Sign     Worried About Running Out of Food in the Last Year: Never true     Ran Out of Food in the Last Year: Never true   Transportation Needs: No Transportation Needs (4/8/2024)    PRAPARE - Transportation     Lack of Transportation (Medical): No     Lack of Transportation (Non-Medical): No   Physical Activity: Sufficiently Active (4/8/2024)    Exercise Vital Sign     Days of Exercise per Week: 3 days     Minutes of Exercise per Session: 60 min   Stress: No Stress Concern Present (4/8/2024)    [employer] - Occupational Stress Questionnaire     Feeling of Stress : Only a little   Housing Stability: Unknown (4/8/2024)    Housing Stability Vital Sign     Unable to Pay for Housing in the Last Year: No     Unstable Housing in the Last Year: No       Chief Complaint:   Chief Complaint   Patient presents with    Pre-op Exam     L PKA sx 2/25/25        Consulting Physician: No ref. provider found    History of present illness:    This is a 49 y.o. year old female who presents today for preop evaluation for robotic assisted left medial compartment partial knee arthroplasty on February 25th, same-day discharge.  No new complaints or concerns today.  She has a  "known history of osteoarthritis of the medial compartment of the left knee.  She limps secondary to the pain.  She was noted to have full-thickness cartilage loss in the medial compartment during arthroscopy.  No relief with corticosteroid injection.  No relief with therapy.  No relief with medications.    Review of Systems:    Constitution:   Denies chills, fever, and sweats.  HENT:   Denies headaches or blurry vision.  Cardiovascular:  Denies chest pain or irregular heart beat.  Respiratory:   Denies cough or shortness of breath.  Gastrointestinal:  Denies abdominal pain, nausea, or vomiting.  Musculoskeletal:   Denies muscle cramps.  Neurological:   Denies dizziness or focal weakness.  Psychiatric/Behavior: Normal mental status.  Hematology/Lymph:  Denies bleeding problem or easy bruising/bleeding.  Skin:    Denies rash or suspicious lesions.    Examination:    Vital Signs:    Vitals:    02/03/25 0815   BP: 126/87   Pulse: 72   Weight: 99.4 kg (219 lb 3.2 oz)   Height: 5' 5" (1.651 m)       Body mass index is 36.48 kg/m².    Constitution:   Well-developed, well nourished patient in no acute distress.  Neurological:   Alert and oriented x 3 and cooperative to examination.     Psychiatric/Behavior: Normal mental status.  Respiratory:   No shortness of breath.non labored breathing.  Cardiovascular: Regular rate and rhythm  Eyes:    Extraoccular muscles intact  Skin:    No scars, rash or suspicious lesions.    Physical Exam:     General Musculoskeletal Exam   Gait: antalgic         Left Knee Exam     Inspection   Erythema: absent  Effusion: present  Deformity: present  Bruising: absent    Tenderness   The patient tender to palpation of the condyle, MCL, medial retinaculum and medial joint line.    Crepitus   The patient has crepitus of the medial joint line.    Range of Motion   Extension: abnormal   Flexion: abnormal   0° to 130°    Tests   Meniscus   Jayce:  Medial - positive Lateral - negative  Stability   MCL " - Valgus: normal (0 to 2mm)  LCL - Varus: normal (0 to 2mm)  Patella   Passive Patellar Tilt: neutral  Pain is medial compartment with no lateral or patellofemoral compartment tenderness.    Other   Sensation: normal    Comments:  Varus deformity    Muscle Strength   Left Lower Extremity   Quadriceps:  5/5   Hamstrin/5     Vascular Exam       Left Pulses  Dorsalis Pedis:      2+  Posterior Tibial:      2+          Imaging: X-rays reviewed of the left knee which show that she is bone-on-bone with advanced medial compartment cartilage space narrowing in the left knee.  Grade 4 changes and sclerosis in the medial compartment of the left knee.  Mild varus deformity.  Medial osteophytes.  Impression: Advanced osteoarthrosis medial compartment left knee and stable well-aligned medial compartment partial knee arthroplasty of the right knee.     Assessment: Primary osteoarthritis of left knee    Impaired gait and mobility    Pre-operative laboratory examination        Plan:  Robotic assisted left knee medial compartment partial knee arthroplasty--same-day discharge    Risks, benefits, alternatives, and complications were explained to the patient and/or patient representative. They understand, agree, and want to proceed with the operation/ procedure.        DISCLAIMER: This note may have been dictated using voice recognition software and may contain grammatical errors.     NOTE: Consult report sent to referring provider via [employer].

## 2025-02-17 ENCOUNTER — ANESTHESIA EVENT (OUTPATIENT)
Dept: SURGERY | Facility: HOSPITAL | Age: 50
End: 2025-02-17
Payer: COMMERCIAL

## 2025-02-17 DIAGNOSIS — E78.5 HYPERLIPIDEMIA, UNSPECIFIED HYPERLIPIDEMIA TYPE: ICD-10-CM

## 2025-02-17 RX ORDER — ROSUVASTATIN CALCIUM 20 MG/1
20 TABLET, COATED ORAL
Qty: 90 TABLET | Refills: 0 | Status: SHIPPED | OUTPATIENT
Start: 2025-02-17

## 2025-02-24 ENCOUNTER — PATIENT MESSAGE (OUTPATIENT)
Dept: ADMINISTRATIVE | Facility: OTHER | Age: 50
End: 2025-02-24
Payer: COMMERCIAL

## 2025-02-24 RX ORDER — ONDANSETRON 4 MG/1
4 TABLET, ORALLY DISINTEGRATING ORAL ONCE
OUTPATIENT
Start: 2025-02-24 | End: 2025-02-24

## 2025-02-25 ENCOUNTER — HOSPITAL ENCOUNTER (OUTPATIENT)
Facility: HOSPITAL | Age: 50
Discharge: HOME | End: 2025-02-26
Attending: SPECIALIST | Admitting: SPECIALIST
Payer: COMMERCIAL

## 2025-02-25 ENCOUNTER — ANESTHESIA (OUTPATIENT)
Dept: SURGERY | Facility: HOSPITAL | Age: 50
End: 2025-02-25
Payer: COMMERCIAL

## 2025-02-25 DIAGNOSIS — Z01.812 PRE-OPERATIVE LABORATORY EXAMINATION: ICD-10-CM

## 2025-02-25 DIAGNOSIS — R26.89 IMPAIRED GAIT AND MOBILITY: ICD-10-CM

## 2025-02-25 DIAGNOSIS — M17.12 PRIMARY OSTEOARTHRITIS OF LEFT KNEE: ICD-10-CM

## 2025-02-25 LAB
B-HCG UR QL: NEGATIVE
CTP QC/QA: YES
POCT GLUCOSE: 110 MG/DL (ref 70–110)
POCT GLUCOSE: 98 MG/DL (ref 70–110)

## 2025-02-25 PROCEDURE — A4216 STERILE WATER/SALINE, 10 ML: HCPCS | Performed by: SPECIALIST

## 2025-02-25 PROCEDURE — 71000033 HC RECOVERY, INTIAL HOUR: Performed by: SPECIALIST

## 2025-02-25 PROCEDURE — 37000008 HC ANESTHESIA 1ST 15 MINUTES: Performed by: SPECIALIST

## 2025-02-25 PROCEDURE — 27446 REVISION OF KNEE JOINT: CPT | Mod: AS,LT,, | Performed by: PHYSICIAN ASSISTANT

## 2025-02-25 PROCEDURE — 99900031 HC PATIENT EDUCATION (STAT)

## 2025-02-25 PROCEDURE — 51798 US URINE CAPACITY MEASURE: CPT | Performed by: SPECIALIST

## 2025-02-25 PROCEDURE — 37000009 HC ANESTHESIA EA ADD 15 MINS: Performed by: SPECIALIST

## 2025-02-25 PROCEDURE — G0378 HOSPITAL OBSERVATION PER HR: HCPCS

## 2025-02-25 PROCEDURE — 63600175 PHARM REV CODE 636 W HCPCS: Mod: JZ,TB | Performed by: ANESTHESIOLOGY

## 2025-02-25 PROCEDURE — 63600175 PHARM REV CODE 636 W HCPCS: Performed by: PHYSICIAN ASSISTANT

## 2025-02-25 PROCEDURE — 81025 URINE PREGNANCY TEST: CPT | Performed by: SPECIALIST

## 2025-02-25 PROCEDURE — 64447 NJX AA&/STRD FEMORAL NRV IMG: CPT | Performed by: ANESTHESIOLOGY

## 2025-02-25 PROCEDURE — 63600175 PHARM REV CODE 636 W HCPCS: Performed by: SPECIALIST

## 2025-02-25 PROCEDURE — 97162 PT EVAL MOD COMPLEX 30 MIN: CPT

## 2025-02-25 PROCEDURE — C1713 ANCHOR/SCREW BN/BN,TIS/BN: HCPCS | Performed by: SPECIALIST

## 2025-02-25 PROCEDURE — 27446 REVISION OF KNEE JOINT: CPT | Mod: LT,,, | Performed by: SPECIALIST

## 2025-02-25 PROCEDURE — 25000003 PHARM REV CODE 250: Performed by: PHYSICIAN ASSISTANT

## 2025-02-25 PROCEDURE — 82962 GLUCOSE BLOOD TEST: CPT | Performed by: SPECIALIST

## 2025-02-25 PROCEDURE — 63600175 PHARM REV CODE 636 W HCPCS

## 2025-02-25 PROCEDURE — 0055T BONE SRGRY CMPTR CT/MRI IMAG: CPT | Mod: ,,, | Performed by: SPECIALIST

## 2025-02-25 PROCEDURE — 94761 N-INVAS EAR/PLS OXIMETRY MLT: CPT

## 2025-02-25 PROCEDURE — D9220A PRA ANESTHESIA: Mod: ANES,,, | Performed by: ANESTHESIOLOGY

## 2025-02-25 PROCEDURE — 64447 NJX AA&/STRD FEMORAL NRV IMG: CPT | Mod: 59,LT,, | Performed by: ANESTHESIOLOGY

## 2025-02-25 PROCEDURE — 71000015 HC POSTOP RECOV 1ST HR: Performed by: SPECIALIST

## 2025-02-25 PROCEDURE — 27000221 HC OXYGEN, UP TO 24 HOURS

## 2025-02-25 PROCEDURE — 25000003 PHARM REV CODE 250

## 2025-02-25 PROCEDURE — 36000713 HC OR TIME LEV V EA ADD 15 MIN: Performed by: SPECIALIST

## 2025-02-25 PROCEDURE — 71000016 HC POSTOP RECOV ADDL HR: Performed by: SPECIALIST

## 2025-02-25 PROCEDURE — 27201423 OPTIME MED/SURG SUP & DEVICES STERILE SUPPLY: Performed by: SPECIALIST

## 2025-02-25 PROCEDURE — 25000003 PHARM REV CODE 250: Performed by: SPECIALIST

## 2025-02-25 PROCEDURE — 71000039 HC RECOVERY, EACH ADD'L HOUR: Performed by: SPECIALIST

## 2025-02-25 PROCEDURE — D9220A PRA ANESTHESIA: Mod: CRNA,,,

## 2025-02-25 PROCEDURE — 94799 UNLISTED PULMONARY SVC/PX: CPT

## 2025-02-25 PROCEDURE — C1776 JOINT DEVICE (IMPLANTABLE): HCPCS | Performed by: SPECIALIST

## 2025-02-25 PROCEDURE — 36000712 HC OR TIME LEV V 1ST 15 MIN: Performed by: SPECIALIST

## 2025-02-25 DEVICE — MAKO X3 UNI ONLAY TIBIAL INSERT SIZE 5 - 8 MM
Type: IMPLANTABLE DEVICE | Site: KNEE | Status: FUNCTIONAL
Brand: MAKO

## 2025-02-25 DEVICE — CEMENT BONE ANTIBIO SIMPLEX P: Type: IMPLANTABLE DEVICE | Site: KNEE | Status: FUNCTIONAL

## 2025-02-25 DEVICE — MCK FEMORAL COMPONENT
Type: IMPLANTABLE DEVICE | Site: KNEE | Status: FUNCTIONAL
Brand: RESTORIS

## 2025-02-25 DEVICE — MCK ONLAY TIBIA BASEPLATE
Type: IMPLANTABLE DEVICE | Site: KNEE | Status: FUNCTIONAL
Brand: RESTORIS

## 2025-02-25 RX ORDER — HYDROMORPHONE HYDROCHLORIDE 2 MG/ML
0.2 INJECTION, SOLUTION INTRAMUSCULAR; INTRAVENOUS; SUBCUTANEOUS EVERY 5 MIN PRN
Status: DISCONTINUED | OUTPATIENT
Start: 2025-02-25 | End: 2025-02-25

## 2025-02-25 RX ORDER — KETOROLAC TROMETHAMINE 30 MG/ML
INJECTION, SOLUTION INTRAMUSCULAR; INTRAVENOUS
Status: DISPENSED
Start: 2025-02-25 | End: 2025-02-25

## 2025-02-25 RX ORDER — NAPROXEN SODIUM 220 MG/1
81 TABLET, FILM COATED ORAL 2 TIMES DAILY
Status: DISCONTINUED | OUTPATIENT
Start: 2025-02-26 | End: 2025-02-26 | Stop reason: HOSPADM

## 2025-02-25 RX ORDER — AMOXICILLIN 250 MG
2 CAPSULE ORAL 2 TIMES DAILY
Status: DISCONTINUED | OUTPATIENT
Start: 2025-02-25 | End: 2025-02-26 | Stop reason: HOSPADM

## 2025-02-25 RX ORDER — SODIUM CHLORIDE 9 MG/ML
INJECTION, SOLUTION INTRAVENOUS CONTINUOUS
Status: DISCONTINUED | OUTPATIENT
Start: 2025-02-25 | End: 2025-02-26 | Stop reason: HOSPADM

## 2025-02-25 RX ORDER — SODIUM CHLORIDE 9 MG/ML
INJECTION, SOLUTION INTRAMUSCULAR; INTRAVENOUS; SUBCUTANEOUS
Status: DISCONTINUED | OUTPATIENT
Start: 2025-02-25 | End: 2025-02-25 | Stop reason: HOSPADM

## 2025-02-25 RX ORDER — LEVOTHYROXINE SODIUM 100 UG/1
100 TABLET ORAL
Status: DISCONTINUED | OUTPATIENT
Start: 2025-02-26 | End: 2025-02-26 | Stop reason: HOSPADM

## 2025-02-25 RX ORDER — KETOROLAC TROMETHAMINE 10 MG/1
10 TABLET, FILM COATED ORAL
Status: DISCONTINUED | OUTPATIENT
Start: 2025-02-25 | End: 2025-02-25 | Stop reason: HOSPADM

## 2025-02-25 RX ORDER — SCOPOLAMINE 1 MG/3D
1 PATCH, EXTENDED RELEASE TRANSDERMAL ONCE AS NEEDED
Status: DISCONTINUED | OUTPATIENT
Start: 2025-02-25 | End: 2025-02-25

## 2025-02-25 RX ORDER — METOCLOPRAMIDE HYDROCHLORIDE 5 MG/ML
10 INJECTION INTRAMUSCULAR; INTRAVENOUS
Status: COMPLETED | OUTPATIENT
Start: 2025-02-25 | End: 2025-02-26

## 2025-02-25 RX ORDER — BUPIVACAINE 13.3 MG/ML
INJECTION, SUSPENSION, LIPOSOMAL INFILTRATION
Status: COMPLETED
Start: 2025-02-25 | End: 2025-02-25

## 2025-02-25 RX ORDER — BISACODYL 10 MG/1
10 SUPPOSITORY RECTAL DAILY PRN
Status: DISCONTINUED | OUTPATIENT
Start: 2025-02-25 | End: 2025-02-26 | Stop reason: HOSPADM

## 2025-02-25 RX ORDER — ROPIVACAINE HYDROCHLORIDE 5 MG/ML
INJECTION, SOLUTION EPIDURAL; INFILTRATION; PERINEURAL
Status: DISPENSED
Start: 2025-02-25 | End: 2025-02-25

## 2025-02-25 RX ORDER — ACETAMINOPHEN 500 MG
1000 TABLET ORAL
Status: COMPLETED | OUTPATIENT
Start: 2025-02-25 | End: 2025-02-25

## 2025-02-25 RX ORDER — SODIUM CHLORIDE, SODIUM GLUCONATE, SODIUM ACETATE, POTASSIUM CHLORIDE AND MAGNESIUM CHLORIDE 30; 37; 368; 526; 502 MG/100ML; MG/100ML; MG/100ML; MG/100ML; MG/100ML
INJECTION, SOLUTION INTRAVENOUS CONTINUOUS
Status: DISCONTINUED | OUTPATIENT
Start: 2025-02-25 | End: 2025-02-25

## 2025-02-25 RX ORDER — VANCOMYCIN HYDROCHLORIDE 1 G/20ML
INJECTION, POWDER, LYOPHILIZED, FOR SOLUTION INTRAVENOUS
Status: DISPENSED
Start: 2025-02-25 | End: 2025-02-25

## 2025-02-25 RX ORDER — GLUCAGON 1 MG
1 KIT INJECTION
Status: DISCONTINUED | OUTPATIENT
Start: 2025-02-25 | End: 2025-02-25 | Stop reason: HOSPADM

## 2025-02-25 RX ORDER — PROPOFOL 10 MG/ML
VIAL (ML) INTRAVENOUS CONTINUOUS PRN
Status: DISCONTINUED | OUTPATIENT
Start: 2025-02-25 | End: 2025-02-25

## 2025-02-25 RX ORDER — EPINEPHRINE 1 MG/ML
INJECTION, SOLUTION, CONCENTRATE INTRAVENOUS
Status: DISPENSED
Start: 2025-02-25 | End: 2025-02-25

## 2025-02-25 RX ORDER — CEFAZOLIN 2 G/1
2 INJECTION, POWDER, FOR SOLUTION INTRAMUSCULAR; INTRAVENOUS
Status: COMPLETED | OUTPATIENT
Start: 2025-02-25 | End: 2025-02-26

## 2025-02-25 RX ORDER — LIDOCAINE HYDROCHLORIDE 20 MG/ML
INJECTION, SOLUTION EPIDURAL; INFILTRATION; INTRACAUDAL; PERINEURAL
Status: DISCONTINUED | OUTPATIENT
Start: 2025-02-25 | End: 2025-02-25

## 2025-02-25 RX ORDER — BUPIVACAINE HYDROCHLORIDE 7.5 MG/ML
INJECTION, SOLUTION EPIDURAL; RETROBULBAR
Status: COMPLETED | OUTPATIENT
Start: 2025-02-25 | End: 2025-02-25

## 2025-02-25 RX ORDER — TAMSULOSIN HYDROCHLORIDE 0.4 MG/1
0.4 CAPSULE ORAL ONCE
Status: COMPLETED | OUTPATIENT
Start: 2025-02-25 | End: 2025-02-25

## 2025-02-25 RX ORDER — ROPIVACAINE HYDROCHLORIDE 5 MG/ML
INJECTION, SOLUTION EPIDURAL; INFILTRATION; PERINEURAL
Status: DISCONTINUED | OUTPATIENT
Start: 2025-02-25 | End: 2025-02-25 | Stop reason: HOSPADM

## 2025-02-25 RX ORDER — METOCLOPRAMIDE HYDROCHLORIDE 5 MG/ML
10 INJECTION INTRAMUSCULAR; INTRAVENOUS EVERY 10 MIN PRN
Status: DISCONTINUED | OUTPATIENT
Start: 2025-02-25 | End: 2025-02-25 | Stop reason: HOSPADM

## 2025-02-25 RX ORDER — MORPHINE SULFATE 10 MG/ML
INJECTION INTRAMUSCULAR; INTRAVENOUS; SUBCUTANEOUS
Status: DISPENSED
Start: 2025-02-25 | End: 2025-02-25

## 2025-02-25 RX ORDER — GLYCOPYRROLATE 0.2 MG/ML
INJECTION INTRAMUSCULAR; INTRAVENOUS
Status: DISCONTINUED | OUTPATIENT
Start: 2025-02-25 | End: 2025-02-25

## 2025-02-25 RX ORDER — CEFAZOLIN 2 G/1
2 INJECTION, POWDER, FOR SOLUTION INTRAMUSCULAR; INTRAVENOUS
Status: DISCONTINUED | OUTPATIENT
Start: 2025-02-25 | End: 2025-02-25 | Stop reason: HOSPADM

## 2025-02-25 RX ORDER — POLYETHYLENE GLYCOL 3350 17 G/17G
17 POWDER, FOR SOLUTION ORAL NIGHTLY
Status: DISCONTINUED | OUTPATIENT
Start: 2025-02-25 | End: 2025-02-26 | Stop reason: HOSPADM

## 2025-02-25 RX ORDER — MIDAZOLAM HYDROCHLORIDE 2 MG/2ML
2 INJECTION, SOLUTION INTRAMUSCULAR; INTRAVENOUS ONCE AS NEEDED
Status: DISCONTINUED | OUTPATIENT
Start: 2025-02-25 | End: 2025-02-25

## 2025-02-25 RX ORDER — CEFAZOLIN SODIUM 1 G/3ML
INJECTION, POWDER, FOR SOLUTION INTRAMUSCULAR; INTRAVENOUS
Status: DISCONTINUED | OUTPATIENT
Start: 2025-02-25 | End: 2025-02-25

## 2025-02-25 RX ORDER — LIDOCAINE HYDROCHLORIDE 10 MG/ML
1 INJECTION, SOLUTION EPIDURAL; INFILTRATION; INTRACAUDAL; PERINEURAL ONCE
Status: DISCONTINUED | OUTPATIENT
Start: 2025-02-25 | End: 2025-02-25 | Stop reason: HOSPADM

## 2025-02-25 RX ORDER — VANCOMYCIN HYDROCHLORIDE 1 G/20ML
INJECTION, POWDER, LYOPHILIZED, FOR SOLUTION INTRAVENOUS
Status: DISCONTINUED | OUTPATIENT
Start: 2025-02-25 | End: 2025-02-25 | Stop reason: HOSPADM

## 2025-02-25 RX ORDER — BUPIVACAINE 13.3 MG/ML
INJECTION, SUSPENSION, LIPOSOMAL INFILTRATION
Status: DISCONTINUED | OUTPATIENT
Start: 2025-02-25 | End: 2025-02-25

## 2025-02-25 RX ORDER — ADHESIVE BANDAGE
30 BANDAGE TOPICAL DAILY PRN
Status: DISCONTINUED | OUTPATIENT
Start: 2025-02-25 | End: 2025-02-26 | Stop reason: HOSPADM

## 2025-02-25 RX ORDER — BUPIVACAINE HYDROCHLORIDE 2.5 MG/ML
INJECTION, SOLUTION EPIDURAL; INFILTRATION; INTRACAUDAL
Status: DISCONTINUED | OUTPATIENT
Start: 2025-02-25 | End: 2025-02-25

## 2025-02-25 RX ORDER — METHOCARBAMOL 750 MG/1
750 TABLET, FILM COATED ORAL EVERY 8 HOURS PRN
Status: DISCONTINUED | OUTPATIENT
Start: 2025-02-25 | End: 2025-02-26 | Stop reason: HOSPADM

## 2025-02-25 RX ORDER — MIDAZOLAM HYDROCHLORIDE 1 MG/ML
INJECTION INTRAMUSCULAR; INTRAVENOUS
Status: DISCONTINUED | OUTPATIENT
Start: 2025-02-25 | End: 2025-02-25

## 2025-02-25 RX ORDER — HYDROCODONE BITARTRATE AND ACETAMINOPHEN 5; 325 MG/1; MG/1
1 TABLET ORAL EVERY 4 HOURS PRN
Status: DISCONTINUED | OUTPATIENT
Start: 2025-02-25 | End: 2025-02-26 | Stop reason: HOSPADM

## 2025-02-25 RX ORDER — HYDROCODONE BITARTRATE AND ACETAMINOPHEN 7.5; 325 MG/1; MG/1
1 TABLET ORAL EVERY 4 HOURS PRN
Refills: 0 | Status: DISCONTINUED | OUTPATIENT
Start: 2025-02-25 | End: 2025-02-26 | Stop reason: HOSPADM

## 2025-02-25 RX ORDER — KETOROLAC TROMETHAMINE 10 MG/1
10 TABLET, FILM COATED ORAL EVERY 6 HOURS
Status: DISCONTINUED | OUTPATIENT
Start: 2025-02-25 | End: 2025-02-26 | Stop reason: HOSPADM

## 2025-02-25 RX ORDER — DOCUSATE SODIUM 100 MG/1
200 CAPSULE, LIQUID FILLED ORAL
Status: DISCONTINUED | OUTPATIENT
Start: 2025-02-26 | End: 2025-02-26 | Stop reason: HOSPADM

## 2025-02-25 RX ORDER — TALC
6 POWDER (GRAM) TOPICAL NIGHTLY PRN
Status: DISCONTINUED | OUTPATIENT
Start: 2025-02-25 | End: 2025-02-26 | Stop reason: HOSPADM

## 2025-02-25 RX ORDER — DIPHENHYDRAMINE HYDROCHLORIDE 50 MG/ML
25 INJECTION INTRAMUSCULAR; INTRAVENOUS EVERY 6 HOURS PRN
Status: DISCONTINUED | OUTPATIENT
Start: 2025-02-25 | End: 2025-02-25 | Stop reason: HOSPADM

## 2025-02-25 RX ORDER — FAMOTIDINE 20 MG/1
20 TABLET, FILM COATED ORAL
Status: DISCONTINUED | OUTPATIENT
Start: 2025-02-26 | End: 2025-02-26 | Stop reason: HOSPADM

## 2025-02-25 RX ORDER — EPINEPHRINE 1 MG/ML
INJECTION, SOLUTION, CONCENTRATE INTRAVENOUS
Status: DISCONTINUED | OUTPATIENT
Start: 2025-02-25 | End: 2025-02-25 | Stop reason: HOSPADM

## 2025-02-25 RX ORDER — ONDANSETRON 4 MG/1
4 TABLET, ORALLY DISINTEGRATING ORAL
Status: COMPLETED | OUTPATIENT
Start: 2025-02-25 | End: 2025-02-25

## 2025-02-25 RX ORDER — BUPIVACAINE HYDROCHLORIDE 2.5 MG/ML
INJECTION, SOLUTION EPIDURAL; INFILTRATION; INTRACAUDAL
Status: COMPLETED
Start: 2025-02-25 | End: 2025-02-25

## 2025-02-25 RX ORDER — SODIUM CHLORIDE 0.9 % (FLUSH) 0.9 %
SYRINGE (ML) INJECTION
Status: DISPENSED
Start: 2025-02-25 | End: 2025-02-25

## 2025-02-25 RX ORDER — GABAPENTIN 300 MG/1
300 CAPSULE ORAL
Status: COMPLETED | OUTPATIENT
Start: 2025-02-25 | End: 2025-02-25

## 2025-02-25 RX ORDER — ONDANSETRON HYDROCHLORIDE 2 MG/ML
4 INJECTION, SOLUTION INTRAVENOUS EVERY 6 HOURS PRN
Status: DISCONTINUED | OUTPATIENT
Start: 2025-02-25 | End: 2025-02-26 | Stop reason: HOSPADM

## 2025-02-25 RX ORDER — LOSARTAN POTASSIUM 50 MG/1
50 TABLET ORAL DAILY
Status: DISCONTINUED | OUTPATIENT
Start: 2025-02-26 | End: 2025-02-26 | Stop reason: HOSPADM

## 2025-02-25 RX ORDER — TRANEXAMIC ACID 650 MG/1
1950 TABLET ORAL
Status: COMPLETED | OUTPATIENT
Start: 2025-02-25 | End: 2025-02-25

## 2025-02-25 RX ORDER — KETOROLAC TROMETHAMINE 30 MG/ML
INJECTION, SOLUTION INTRAMUSCULAR; INTRAVENOUS
Status: DISCONTINUED | OUTPATIENT
Start: 2025-02-25 | End: 2025-02-25 | Stop reason: HOSPADM

## 2025-02-25 RX ORDER — ONDANSETRON HYDROCHLORIDE 2 MG/ML
4 INJECTION, SOLUTION INTRAVENOUS DAILY PRN
Status: DISCONTINUED | OUTPATIENT
Start: 2025-02-25 | End: 2025-02-25

## 2025-02-25 RX ADMIN — METOCLOPRAMIDE HYDROCHLORIDE 10 MG: 5 INJECTION INTRAMUSCULAR; INTRAVENOUS at 06:02

## 2025-02-25 RX ADMIN — SODIUM CHLORIDE, SODIUM GLUCONATE, SODIUM ACETATE, POTASSIUM CHLORIDE AND MAGNESIUM CHLORIDE: 526; 502; 368; 37; 30 INJECTION, SOLUTION INTRAVENOUS at 08:02

## 2025-02-25 RX ADMIN — KETOROLAC TROMETHAMINE 10 MG: 10 TABLET, FILM COATED ORAL at 01:02

## 2025-02-25 RX ADMIN — BUPIVACAINE 10 ML: 13.3 INJECTION, SUSPENSION, LIPOSOMAL INFILTRATION at 11:02

## 2025-02-25 RX ADMIN — CEFAZOLIN 2 G: 2 INJECTION, POWDER, FOR SOLUTION INTRAMUSCULAR; INTRAVENOUS at 06:02

## 2025-02-25 RX ADMIN — TRANEXAMIC ACID 1950 MG: 650 TABLET ORAL at 07:02

## 2025-02-25 RX ADMIN — CEFAZOLIN 2 G: 330 INJECTION, POWDER, FOR SOLUTION INTRAMUSCULAR; INTRAVENOUS at 09:02

## 2025-02-25 RX ADMIN — PROPOFOL 100 MCG/KG/MIN: 10 INJECTION, EMULSION INTRAVENOUS at 09:02

## 2025-02-25 RX ADMIN — SENNOSIDES AND DOCUSATE SODIUM 2 TABLET: 50; 8.6 TABLET ORAL at 09:02

## 2025-02-25 RX ADMIN — ACETAMINOPHEN 1000 MG: 500 TABLET ORAL at 07:02

## 2025-02-25 RX ADMIN — HYDROMORPHONE HYDROCHLORIDE 0.2 MG: 2 INJECTION INTRAMUSCULAR; INTRAVENOUS; SUBCUTANEOUS at 11:02

## 2025-02-25 RX ADMIN — SODIUM CHLORIDE: 9 INJECTION, SOLUTION INTRAVENOUS at 06:02

## 2025-02-25 RX ADMIN — HYDROCODONE BITARTRATE AND ACETAMINOPHEN 1 TABLET: 5; 325 TABLET ORAL at 09:02

## 2025-02-25 RX ADMIN — METOCLOPRAMIDE HYDROCHLORIDE 10 MG: 5 INJECTION INTRAMUSCULAR; INTRAVENOUS at 01:02

## 2025-02-25 RX ADMIN — ONDANSETRON 4 MG: 4 TABLET, ORALLY DISINTEGRATING ORAL at 07:02

## 2025-02-25 RX ADMIN — POLYETHYLENE GLYCOL 3350 17 G: 17 POWDER, FOR SOLUTION ORAL at 09:02

## 2025-02-25 RX ADMIN — GLYCOPYRROLATE 0.2 MG: 0.2 INJECTION INTRAMUSCULAR; INTRAVENOUS at 08:02

## 2025-02-25 RX ADMIN — KETOROLAC TROMETHAMINE 10 MG: 10 TABLET, FILM COATED ORAL at 06:02

## 2025-02-25 RX ADMIN — CEFAZOLIN 2 G: 2 INJECTION, POWDER, FOR SOLUTION INTRAMUSCULAR; INTRAVENOUS at 01:02

## 2025-02-25 RX ADMIN — MIDAZOLAM 2 MG: 1 INJECTION INTRAMUSCULAR; INTRAVENOUS at 08:02

## 2025-02-25 RX ADMIN — SODIUM CHLORIDE, SODIUM GLUCONATE, SODIUM ACETATE, POTASSIUM CHLORIDE AND MAGNESIUM CHLORIDE: 526; 502; 368; 37; 30 INJECTION, SOLUTION INTRAVENOUS at 07:02

## 2025-02-25 RX ADMIN — GABAPENTIN 300 MG: 300 CAPSULE ORAL at 07:02

## 2025-02-25 RX ADMIN — TAMSULOSIN HYDROCHLORIDE 0.4 MG: 0.4 CAPSULE ORAL at 06:02

## 2025-02-25 RX ADMIN — KETOROLAC TROMETHAMINE 10 MG: 10 TABLET, FILM COATED ORAL at 10:02

## 2025-02-25 RX ADMIN — LIDOCAINE HYDROCHLORIDE 50 MG: 20 INJECTION, SOLUTION EPIDURAL; INFILTRATION; INTRACAUDAL; PERINEURAL at 09:02

## 2025-02-25 RX ADMIN — BUPIVACAINE HYDROCHLORIDE 20 ML: 2.5 INJECTION, SOLUTION EPIDURAL; INFILTRATION; INTRACAUDAL; PERINEURAL at 11:02

## 2025-02-25 RX ADMIN — BUPIVACAINE HYDROCHLORIDE 1.8 ML: 7.5 INJECTION, SOLUTION EPIDURAL; RETROBULBAR at 09:02

## 2025-02-25 RX ADMIN — METHOCARBAMOL 750 MG: 750 TABLET ORAL at 09:02

## 2025-02-25 NOTE — OR NURSING
2/25/2025   12:48 PM    Nurse Note:       Prior to discharge, the Patient/Support Person was educated and was able to verbalize understanding on following:    [x] Yes   [] No   [] Further Education Provided    Knee Replacement Specific Education   Pain management, Medication Management and Prescriptions  Activity level  Orthopedic precautions/braces - Knee Immobilizer  Complication Prevention to include: Constipation, post-op urinary retention, pneumonia, bleeding, falls, infection, DVT prophylaxis and nausea vomiting  Wound care Instructions/Bandages provided  Next dose due for pain and complication prevention medications      Perception of Care - Joint Replacement Population Total Joint Surgery List: KNEE    Patient able to verbalize one way to treat/prevent SWELLING at home   [x] Yes   [] No   [] Further Education Provided      Attending Nurse:       Flower Skaggs rn

## 2025-02-25 NOTE — ANESTHESIA PREPROCEDURE EVALUATION
"                                                                                                             02/24/2025  Maria E Lopez is a 49 y.o., female.  Diagnosis:      Primary osteoarthritis of left knee [M17.12]      S/P left knee arthroscopy [Z98.890]   Pre-op diagnosis:      Primary osteoarthritis of left knee [M17.12]      S/P left knee arthroscopy [Z98.890]   [facility]: [facility] / [facility]         The pt. comes to [facility] for the noted procedure under SAB vs GA (LMA vs GETA).and postOp Adductor canal blk.  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   Microscopic hematuria Osteoarthritis of right knee   Persistent proteinuria Prediabetes   Severe obesity (BMI 35.0-39.9) with comorbidity      No specialty history recorded    Other Medical History   Hypothyroidism Obesity   Prediabetes Chronic constipation   Hypertension Osteoarthritis   Hyperlipidemia Hashimoto's thyroiditis   Torn meniscus Sleep apnea   Renal disorder          PSHx:  Surgical History:  BIOPSY OF LESION rt knee arthroscopy   TOTAL KNEE ARTHROPLASTY COLONOSCOPY   KNEE ARTHROSCOPY W/ MENISCECTOMY            Vital signs:    Most Recent Value 2/12/2025  0823 11/25/2024  0929 10/31/2024  0932    Height: 5' 5" (165.1 cm)  as of 2/12/2025 5' 5" (165.1 cm) 5' 6" (167.6 cm) 5' 6" (167.6 cm)   Last Wt: 99.3 kg (219 lb)  as of 2/12/2025 99.3 kg (219 lb) 103.4 kg (228 lb) 103.3 kg (227 lb 12.8 oz)   Body Mass Index: 36.44 kg/m² Abnormal   5' 5" (165.1 cm)  as of 2/12/2025  99.3 kg (219 lb)  as of 2/12/2025      Pulse: 72  as of 2/3/2025 -- 87 72   BP: 126/87  as of " 2/3/2025 -- 125/88 128/88   Temp: 36.5 °C (97.7 °F)  as of 1/9/2025 -- -- 37.1 °C (98.8 °F)   SpO2: 95%  as of 1/9/2025 -- -- 99 %   Dosing Weight: None            Lab Data:      EKG:                Pre-op Assessment    I have reviewed the Patient Summary Reports.     I have reviewed the Nursing Notes. I have reviewed the NPO Status.   I have reviewed the Medications.     Review of Systems  Anesthesia Hx:  No problems with previous Anesthesia                Social:  Non-Smoker       Hematology/Oncology:  Hematology Normal   Oncology Normal                                   EENT/Dental:  EENT/Dental Normal           Cardiovascular:  Exercise tolerance: good   Hypertension                  Functional Capacity good / => 4 METS                   Hypertension         Pulmonary:        Sleep Apnea     Obstructive Sleep Apnea (BIA).           Renal/:  Chronic Renal Disease        Kidney Function/Disease             Hepatic/GI:  Hepatic/GI Normal                    Musculoskeletal:  Arthritis        Arthritis          Neurological:  Neurology Normal              Arthritis                           Endocrine:   Hypothyroidism       Hypothyroidism          Dermatological:  Skin Normal    Psych:  Psychiatric Normal                    Physical Exam  General: Alert, Oriented, Well nourished and Cooperative    Airway:  Mallampati: II   Mouth Opening: Normal  TM Distance: Normal  Tongue: Normal  Neck ROM: Normal ROM    Dental:  Intact    Chest/Lungs:  Clear to auscultation, Normal Respiratory Rate    Heart:  Rate: Normal  Rhythm: Regular Rhythm        Anesthesia Plan  Type of Anesthesia, risks & benefits discussed:    Anesthesia Type: Spinal, Gen Natural Airway  Intra-op Monitoring Plan: Standard ASA Monitors  Post Op Pain Control Plan: IV/PO Opioids PRN and intrathecal opioid  Induction:  IV  Informed Consent: Informed consent signed with the Patient and all parties understand the risks and agree with anesthesia plan.  All  questions answered. Patient consented to blood products? Yes  ASA Score: 2  Day of Surgery Review of History & Physical: H&P Update referred to the surgeon/provider.    Ready For Surgery From Anesthesia Perspective.     .

## 2025-02-25 NOTE — ANESTHESIA PROCEDURE NOTES
Spinal    Diagnosis: Osteoarthritis  Patient location during procedure: OR  Start time: 2/25/2025 9:20 AM  Timeout: 2/25/2025 9:15 AM  End time: 2/25/2025 9:25 AM    Staffing  Authorizing Provider: Shaw Armstrong MD  Performing Provider: Melvin Torres CRNA    Staffing  Performed by: Melvin Torres CRNA  Authorized by: Shaw Armstrong MD    Preanesthetic Checklist  Completed: patient identified, IV checked, site marked, risks and benefits discussed, surgical consent, monitors and equipment checked, pre-op evaluation and timeout performed  Spinal Block  Patient position: sitting  Prep: ChloraPrep  Patient monitoring: heart rate, cardiac monitor, continuous pulse ox and frequent blood pressure checks  Approach: midline  Location: L3-4  Injection technique: single shot  CSF Fluid: clear free-flowing CSF  Needle  Needle type: pencil-tip   Needle gauge: 25 G  Needle length: 3.5 in  Additional Documentation: incremental injection, negative aspiration for heme and no paresthesia on injection  Needle localization: anatomical landmarks  Assessment  Ease of block: difficult  Patient's tolerance of the procedure: comfortable throughout block and no complaints  Additional Notes  SA sp id'ed, +CSF free flow, injected Sp Marcaine as noted above w/o difficulty.    Returned supine for prep and drape. Tolerated procedure well. Appears to have  Adq blk for planned Sx.  Medications:    Medications: bupivacaine (pf) (MARCAINE) injection 0.75% - Intraspinal   1.8 mL - 2/25/2025 9:24:00 AM

## 2025-02-25 NOTE — ANESTHESIA PROCEDURE NOTES
Peripheral Block/Adductor Canal blk    Patient location during procedure: pre-op   Block not for primary anesthetic.  Reason for block: at surgeon's request and post-op pain management   Post-op Pain Location: Left knee   Start time: 2/25/2025 11:17 AM  Timeout: 2/25/2025 11:15 AM   End time: 2/25/2025 11:20 AM    Staffing  Authorizing Provider: Shaw Armstrong MD  Performing Provider: Shaw Armstrong MD    Staffing  Performed by: Shaw Armstrong MD  Authorized by: Shaw Armstrong MD    Preanesthetic Checklist  Completed: patient identified, IV checked, site marked, risks and benefits discussed, surgical consent, monitors and equipment checked, pre-op evaluation and timeout performed  Peripheral Block  Patient position: supine  Prep: ChloraPrep  Patient monitoring: heart rate, cardiac monitor, continuous pulse ox, continuous capnometry and frequent blood pressure checks  Block type: adductor canal  Laterality: left  Injection technique: single shot  Needle  Needle type: Stimuplex   Needle gauge: 21 G  Needle length: 4 in  Needle localization: anatomical landmarks and ultrasound guidance   -ultrasound image captured on disc.  Assessment  Injection assessment: negative aspiration, negative parasthesia and local visualized surrounding nerve  Paresthesia pain: none  Heart rate change: no  Slow fractionated injection: yes  Pain Tolerance: comfortable throughout block and no complaints  Medications:    Medications: bupivacaine (pf) (MARCAINE) injection 0.25% - Perineural   20 mL - 2/25/2025 11:17:00 AM    Additional Notes  VSS.  [facility] RN monitoring vitals throughout procedure.      VSS.  [facility] RN monitoring vitals throughout procedure.       Utilizing Ultrasound, Adductor canal and associated anatomy identified.  Visualizing block needle as advanced to target area(Femoral artery and Nerve  adjacent to Femoral artery). Incremental injection of local anesthetic observed surrounding Femoral artery   and nerves. Image saved  to EMR.    Total Local injected: 30mls, incremental negative aspiration/injection(1+5mls).    VSS.  [facility] RN monitoring vitals throughout procedure.  Patient tolerated procedure well.         Patient tolerated procedure well.

## 2025-02-25 NOTE — PT/OT/SLP EVAL
Physical Therapy Evaluation    Patient Name:  Maria E Lopez   MRN:  [id number]    Recommendations:     Discharge Recommendations: Low Intensity Therapy   Discharge Equipment Recommendations: walker, rolling   Barriers to discharge: None    Assessment:     Maria E Lopez is a 49 y.o. female admitted with a medical diagnosis of Primary osteoarthritis of left knee.  She presents with the following impairments/functional limitations: decreased lower extremity function, pain, decreased ROM, edema, orthopedic precautions .    Rehab Prognosis: Good; patient would benefit from acute skilled PT services to address these deficits and reach maximum level of function.    Recent Surgery: Procedure(s) (LRB):  SDD- ROBOTIC ARTHROPLASTY, KNEE,  UNICOMPARTMENTAL #3 (Left) * Day of Surgery *    Plan:     During this hospitalization, patient to be seen   to address the identified rehab impairments via gait training, therapeutic activities, therapeutic exercises and progress toward the following goals:    Plan of Care Expires:  03/03/25    Subjective     Chief Complaint: L knee pain  Patient/Family Comments/goals:   Pain/Comfort:  Location - Side 1: Left  Location 1: knee  Pain Addressed 1: Pre-medicate for activity, Reposition, Distraction    Patients cultural, spiritual, [employer]ism conflicts given the current situation:      Living Environment:  Pt lives in single story home with daughter, only small threshold to enter  Prior to admission, patients level of function was ind.  Equipment used at home: none.  DME owned (not currently used): standard walker.  Upon discharge, patient will have assistance from daughter.    Objective:     Communicated with nurse prior to session.  Patient found supine with peripheral IV, telemetry  upon PT entry to room.    General Precautions: Standard, fall  Orthopedic Precautions:LLE weight bearing as tolerated   Braces: N/A  Respiratory Status: Room air    Exams:  RLE ROM: WFL  RLE  "Strength: WFL  LLE ROM:     (In degrees) AROM PROM   L knee flexion 100 105   L knee extension 0       LLE Strength: NT dt sx side    Functional Mobility:  Bed Mobility:     Supine to Sit: stand by assistance  Sit to Supine: stand by assistance  Transfers:     Sit to Stand:  stand by assistance with rolling walker  Car Transfer: stand by assistance with  rolling walker  using  Step Transfer  Gait: Pt ambulated 600 ft+ w rw and SBA, using step through gait pattern at normal pace  Stairs:  Pt ascended/descended 3 stair(s) and 4" curb step with Rolling Walker with bilateral handrails with Contact Guard Assistance.           Treatment & Education:  Pt edu on total knee protocol and importance of frequent mobility  Pt completed prehab prior to sx  Pt completed seated TKA therex x10 AAROM    Patient left supine with all lines intact, call button in reach, nurse notified, and daughter present.    GOALS:   Multidisciplinary Problems       Physical Therapy Goals       Not on file                    DME Justifications:   Maria E's mobility limitation cannot be sufficiently resolved by the use of a cane. Her functional mobility deficit can be sufficiently resolved with the use of a Rolling Walker. Patient's mobility limitation significantly impairs their ability to participate in one of more activities of daily living.  The use of a RW will significantly improve the patient's ability to participate in MRADLS and the patient will use it on regular basis in the home.    History:     Past Medical History:   Diagnosis Date    Chronic constipation     Hashimoto's thyroiditis     Hyperlipidemia     Hypertension     Hypothyroidism     Obesity     Osteoarthritis     Prediabetes     controlled with diet    Renal disorder     autoimmune disorder due to increased levels protein    Sleep apnea     does not use any machine    Torn meniscus        Past Surgical History:   Procedure Laterality Date    BIOPSY OF LESION      COLONOSCOPY      " KNEE ARTHROSCOPY W/ MENISCECTOMY Left 08/01/2024    Procedure: ARTHROSCOPY, KNEE, WITH MENISCECTOMY;  Surgeon: Jorge A Kingsley MD;  Location: [facility];  Service: Orthopedics;  Laterality: Left;  left knee ats    rt knee arthroscopy      TOTAL KNEE ARTHROPLASTY Right     partial       Time Tracking:     PT Received On:    PT Start Time: 1640     PT Stop Time: 1652  PT Total Time (min): 12 min     Billable Minutes: Evaluation 12 02/25/2025

## 2025-02-25 NOTE — OR NURSING
Pt. Unable to urinate. Bladder scan perfomred and 377 noted. Conrad LANDEROS notified. Pt. T/f to room 303.

## 2025-02-25 NOTE — PLAN OF CARE
02/25/25 1345   Discharge Planning   Assessment Type Discharge Planning Brief Assessment   Support Systems Children   Equipment Currently Used at Home walker, rolling   Current Living Arrangements home   Patient/Family Anticipates Transition to home with family   Patient/Family Anticipated Services at Transition outpatient care   DME Needed Upon Discharge  walker, rolling   Discharge Plan A Home with family   Discharge Plan B Home with family     SDD. S/p PKR. Spk w pt & daughter- Nicole ... daughter to asst w homecare. Pt has RW. Provider list given. Foc obtained.   Called referral for outpatient [facility] Appt scheduled for tomorrow.     Contact # [email or web address] [phone]  Patient DID participate in a pre-op exercise regimen

## 2025-02-25 NOTE — DISCHARGE INSTRUCTIONS
[facility]  [address]  [place] [address]  Phone [phone]       /      Fax [phone]  SURGEON: Dr. Kingsley    After discharge, all questions or concerns should be handled at your surgeon's office ([phone]). If it is a weekend or after hours, you will get the surgeon on call.     Discharge Medications:    PAIN MANAGEMENT: Next Dose Available   Robaxin/Methocarbamol 750mg (Muscle Relaxer) - Every 6-8 hours AS NEEDED for muscle spasms, thigh pain or additional pain control Anytime if needed   Norco 7.5/325mg (Hydrocodone/Acetaminophen) (Pain Med) - every 4-6 hours AS NEEDED for pain 11:30am   COMPLICATION PREVENTION MEDS: Next Dose DUE   Aspirin 81mg twice a day for 6 weeks post-op for blood clot prevention PM on 2/26/25   Miralax 17gm or Senokot S/Carito-Colace 8.6/50mg 2 tablets - once or twice a day while on narcotics and muscle relaxers for constipation prevention PM on 2/26/25   NOZIN NASAL  - twice a day for 2 weeks or until supply runs out, whichever comes first (Infection prevention) PM on 2/26/25     Total Knee Replacement                                                                                                                                    PAIN MEDICATIONS/PAIN MANAGEMENT: (Use the medication log in your discharge packet to keep track of your medications)  To prevent damage to the kidneys, NO anti-inflammatories (Ibuprofen, Aleve, Motrin, Naproxen, Mobic/Meloxicam, Toradol/Ketorolac, Celebrex, Diclofenac/Voltaren...etc) for 2 weeks or until the wound is healed.    Norco 7.5/325mg (Hydrocodone/Acetaminophen) (pain pill) - You can take 1 tablet every 4-6 hours for pain. If the pain is mild, take 1/2 of a pill. Once you start taking a half of a pain pill, you can take a Tylenol 325mg with each dose for a little extra pain relief without side effects. Gradually decrease the use as the pain lessens. As you decrease the Norco, increase the  Tylenol.    **NO MORE THAN 3000mg OF TYLENOL IN 24 HOURS**.     Robaxin/Methocarbamol 750mg (muscle relaxer)- you can take every 6-8 hours as needed for muscle spasms, thigh pain and stiffness, additional pain control or breakthrough pain medications. This medication is helpful for pain control while lessening your need for narcotics. Please reduce the use gradually as the pain and spasms lessen. DO NOT TAKE AT THE SAME TIME AS A PAIN PILL. YOU WILL BE BETTER SERVED WITH 2 HOURS BETWEEN PAIN PILL AND MUSCLE RELAXER.     **Other things that help with pain control is WALKING, COMPRESSION WRAP, ICE and ELEVATION!!**    BLOOD CLOT PREVENTION:   Aspirin 81mg (blood thinner) - twice a day for 6 weeks for blood clot prevention. Start on the evening of 02/26/2025. Stop on 4/9/2025   If you were taking Baby Aspirin 81mg prior to surgery, may return to daily dose AFTER 6 weeks - 4/10/2025 .  You need to continuing wearing your compression stockings (GEOVANNY Hose - ThromboEmbolic Disease Prevention Device) for the next 2-6 weeks post-op. It is ok to remove them for hygiene and at bedtime.   Hand wash and Dry. **If the swelling persists in the legs after you stop wearing the GEOVANNY hose, continue to wear them until the swelling decreases.**  REMOVE STOCKINGS AT LEAST DAILY FOR SKIN ASSESSMENT.   Do NOT let the stockings roll down, creating a tourniquet around the back of your knee or ankle. If you need to, leave the excess at the bottom of the stocking.   The best thing you can do to prevent blood clots is to walk around as much as possible, AT LEAST EVERY 1-2 HOURS.       CONSTIPATION PREVENTION:   Miralax or Senokot S/Carito-Colace and Stool softeners EVERY DAY while on pain meds.  Use other more aggressive over the counter LAXATIVES as needed for constipation (Examples: Milk of Magnesia, Dulcolax tabs or suppository, Magnesium Citrate, Fleet's Enema...etc.)   Drink lots of water.  Increase Fiber in diet.  Increase walking distance  each day  DO NOT GO MORE THAN 2 DAYS WITHOUT HAVING A BOWEL MOVEMENT!    ACTIVITY:   Weight bearing precautions as follows:  FULL weight bearing to operative leg with walker.   DO NOT TAKE YOURSELF OFF OF THE WALKER TOO SOON. ALLOW YOUR OUTPATIENT THERAPIST or SURGEON TO GUIDE YOU.   Range of motion as tolerated. Work on BENDING and STRAIGHTENING your knee. Change positions often throughout the day. DO NOT PUT ANYTHING BEHIND THE KNEE, KEEPING IT IN A BENT POSITION.   Walk around at least every 1-2 hours while awake.   No heavy lifting, pulling, pushing or straining. Take it easy!  Outpatient Physical Therapy - Bring prescription to clinic of choice as soon as possible.      SWELLING PREVENTION AND TREATMENT:  Elevate affected extremity way ABOVE THE LEVEL OF THE HEART to reduce swelling (15-30 minutes at a time, 3 times a day).  Ice the Knee, thigh and lower leg AS MUCH AS POSSIBLE  Compression stockings and ace wrap around the knee and thigh as much as possible. Ok to remove at night for comfort.     WOUND CARE:   Poke hole (Small white bandage) - Dry dressing changes every other day and as needed for soiling for 14 days. Start FRIDAY.  NOTIFY MD OF EXCESSIVE WOUND DRAINAGE.     Operative Knee Incision - Grey Mepilex 7-day bandage- Do NOT REMOVE until change date 3/5/25, unless soiled. NO ointments, creams, lotions or antiseptics on the incision. Once removed on the change date, apply occlusive coverlet dressing (long white bandage) and change every other day and as needed for soiling for the next 7 days or until you see your surgeon.     DO NOT TOUCH INCISION(s). DO NOT WET THE INCISION(s). DO NOT apply any ointments, creams, lotions or antiseptics to the incision(s).   Ace wrap - apply your compression stocking to the lower leg and apply the ace wrap where the stocking stops for extra added compression to the knee and thigh.   May wet incision AFTER 2 weeks- (after you follow-up with your surgeon). Ok to shower  before then if able to keep wound from getting wet (plastic barrier, saran wrap or cling wrap and tape).       URINARY RETENTION:  If you start having difficulty urinating, decrease the use of Pain pills and muscle relaxers and notify your primary care doctor.     PNEUMONIA PREVENTION:  Stay out of bed as much as possible and walk around every 1-2 hours.  Continue breathing exercises (Incentive Spirometry) every 1-2 hours while mobility is limited and while you are on pain pills.    FALL PREVENTION:  Wear sturdy shoes that fit well - Wearing shoes with high heels or slippery soles, or shoes that are too loose, can lead to falls. Walking around in bare feet, or only socks, can also increase your risk of falling.  Use walker as long as your surgeon and therapist recommend it  Use good lighting and  throw rugs, electrical cords, furniture and clutter (anything than can cause you to trip at home.   Non-slip rug in bathroom or shower      INFECTION PREVENTION:  NOZIN ANTISEPTIC NASAL  - twice a day for 2 weeks or until supply runs out, whichever comes first. Shake bottle well, saturate cotton swab with 4 drops of antiseptic solution. Swab right nostril rim 6-8 times clockwise and counterclockwise. Take swab out, apply 2 more drops then swab left nostril rim 6-8 times clockwise and counterclockwise.   Proper handwashing before and after dressing changes. Do not wet the wound. Wound care instructions as written above. NOTIFY MD OF EXCESSIVE WOUND DRAINAGE.  No alcohol, smoking or tobacco products  Pets should not be allowed around the wound or the dressing.   Treat UTI and skin infections as soon as possible.  Pre-medicate with antibiotics prior to dental or surgical procedures.   If you are diabetic, MAINTAIN GOOD BLOOD SUGAR CONTROL (Below 150) DURING YOUR RECOVERY. If you see high numbers, notify your primary care doctor.     Call your SURGEON'S OFFICE ([phone]) if you experience the following signs and  symptoms of infection:   Unusual redness, swelling, excessive, cloudy or foul smelling drainage at the incision site.   Persistent low grade temp OR a temp greater than 102 F, unrelieved by Tylenol  Pain at surgical site, unrelieved by pain meds    Warning signs of a blood clot in your leg: (CALL YOUR SURGEON)  New onset or increasing pain in calf, new onset tenderness or redness above or below the knee or increasing swelling of your calf, ankle, or foot.  Warning signs that a blood clot has traveled to your lungs: (REPORT TO THE ER/CALL [phone])  Sudden or increase in Shortness of breath, sudden onset of chest pains, or  Localized chest pain with coughing.       IF ANY ISSUES ARISE AND YOU FEEL THE NEED TO CALL YOUR PRIMARY CARE DOCTOR, PLEASE LET YOUR SURGEON KNOW AS WELL.     For emergencies, please report to OUR ([facility] or [facility] [place]) Emergency department and tell them to call YOUR SURGEON at [phone].     BEFORE MAKING ANY CHANGES TO THE MEDICAL CARE PLAN OR GOING TO THE EMERGENCY ROOM, PLEASE CONTACT THE SURGEON.      After discharge, all questions or concerns should be handled at your surgeon's office ([phone]). If it is a weekend or after hours, you will get the surgeon on call.     Dede Dugan RN, nurse navigator, available for questions or issues after discharge at [phone].

## 2025-02-25 NOTE — TRANSFER OF CARE
"Anesthesia Transfer of Care Note    Patient: Maria E Lopez    Procedure(s) Performed: Procedure(s) (LRB):  SDD- ROBOTIC ARTHROPLASTY, KNEE,  UNICOMPARTMENTAL #3 (Left)    Patient location: PACU    Anesthesia Type: spinal and general    Transport from OR: Transported from OR on room air with adequate spontaneous ventilation    Post pain: adequate analgesia    Post assessment: no apparent anesthetic complications and tolerated procedure well    Post vital signs: stable    Level of consciousness: awake    Nausea/Vomiting: no nausea/vomiting    Complications: none    Transfer of care protocol was followed    Last vitals: Visit Vitals  BP (!) 144/95 (BP Location: Left arm, Patient Position: Lying)   Pulse 64   Temp (!) 35.2 °C (95.3 °F) (Tympanic)   Resp 20   Ht 5' 6" (1.676 m)   Wt 100.3 kg (221 lb 1.9 oz)   SpO2 99%   Breastfeeding No   BMI 35.69 kg/m²     "

## 2025-02-25 NOTE — OP NOTE
DATE OF PROCEDURE: 02/25/2025   PREOPERATIVE DIAGNOSIS: Left knee medial compartment arthritis.   POSTOPERATIVE DIAGNOSIS :Left  knee medial compartment arthritis.   PROCEDURES PERFORMED: Left knee robotically assisted unicompartmental   arthroplasty (SHERIE).   SURGEON: Ezra Kingsley M.D.   ASSISTANT: First assistant:Conrad Reyes, certified physician assistant, who was necessary and essential for all aspects of the operation, including but no limited to patient positioning, surgical exposure, bony preparation, implantation, wound closure, and dressing placement.    ANESTHESIA: spinal  EBL: less than 25 ml   COMPLICATIONS: None.   COUNTS: Correct.   DISPOSITION: Recovery Room, stable.   IMPLANTS: SHERIE Restoris size 5, Left femoral component with a size 5 left}   tibia with a 8 mm polyethylene tibial bearing insert.   INDICATIONS FOR PROCEDURE: Maria E Lopez is a 49 y.o. year old female who is having   symptoms of Left knee pain. Physical examination and imaging studies were   consistent with medial compartment arthritis. Treatment options were explained   and it was decided to proceed with left knee unicompartmental   arthroplasty. she is aware of reasonable treatment options as well as risks and   benefits and elected to undergo the procedure.   PROCEDURE IN DETAIL: After appropriate consent was obtained, the patient was  brought to the Operating Room and anesthesia was administered. She received   antibiotic prophylaxis. She was positioned appropriately on the table. Bony   prominences were well-padded. Tourniquet was applied. The Left knee and lower   extremity were then prepped and draped in the usual sterile fashion. A medially based anterior  incision was made from the tibial tubercle just proximal to the superior pole of   patella.  It was taken down through the   skin and retinacular layer and a medial arthrotomy was performed. The knee was inspected and felt   to be a good candidate for a  unicompartmental arthroplasty. There was no   significant pathology in the lateral or patellofemoral compartment. ACL was   intact. At this point, the tibial pins for the array were inserted 4   fingerbreadths below the tibial tubercle and the femoral arrays were inserted   into the femur 4 fingerbreadths above the superior pole of the patella. This   was done without difficulty. Hip center and ankle center registration was performed. Femoral and tibial checkpoints were placed and registered. Fine point registration of the femur and tibia was performed followed by excision of osteophytes and ligament balancing. Cartilage mapping was performed and the plan was finalized. Tracking was appropriate.    At this point, the   DEVON was brought in. It was registered. Robotic assisted femoral and tibial bone preparation was performed.  Care was taken during the preparation to protect the soft tissue.  Meniscal remnants were removed following burring.  The ACL was checked and was safe.  Once this was accomplished, trial reduction was performed with a size 5  femur, size 5 tibia and an 8 mm poly. The knee was brought into full extension. Range of motion was 0 degrees to 130 degrees, with excellent balancing and tracking throughout range of motion. The components were well aligned, with no patella impingement. The knee tracked very well.  Check points were removed.The bone was prepared for cementing   after pulsatile lavage and drying. Components were cemented into place, tibia   followed by femur. Meticulous care was taken to remove excess cement. The tibial polyethylene insert was implanted. It was firmly seated and the knee brought into extension.  When the cement dried, the knee was brought through range of motion, and tracked well. We inspected again for cement and soft tissue debris. The measurements matched our plan well.The knee was injected for postoperative pain control, and then the wound was copiously irrigated. The  checpoint, array and guide pins were removed. The pin site incisions were   then closed with suture and surgical staples.  The arthrotomy was closed with #1 braided suture.  The superficial layers were closed with 2-0 vicryl.  The skin was closed with surgical staples.   Sterile dressings were applied. The patient was transferred to the Recovery Room in stable condition. She tolerated the procedure  well and there were no known complications.

## 2025-02-26 VITALS
HEART RATE: 64 BPM | DIASTOLIC BLOOD PRESSURE: 72 MMHG | BODY MASS INDEX: 35.54 KG/M2 | OXYGEN SATURATION: 100 % | SYSTOLIC BLOOD PRESSURE: 108 MMHG | HEIGHT: 66 IN | WEIGHT: 221.13 LBS | TEMPERATURE: 99 F | RESPIRATION RATE: 18 BRPM

## 2025-02-26 PROCEDURE — 25000003 PHARM REV CODE 250: Performed by: PHYSICIAN ASSISTANT

## 2025-02-26 PROCEDURE — G0378 HOSPITAL OBSERVATION PER HR: HCPCS

## 2025-02-26 PROCEDURE — 25000003 PHARM REV CODE 250: Performed by: NURSE PRACTITIONER

## 2025-02-26 PROCEDURE — 97116 GAIT TRAINING THERAPY: CPT

## 2025-02-26 PROCEDURE — 63600175 PHARM REV CODE 636 W HCPCS: Performed by: PHYSICIAN ASSISTANT

## 2025-02-26 RX ADMIN — SENNOSIDES AND DOCUSATE SODIUM 2 TABLET: 50; 8.6 TABLET ORAL at 07:02

## 2025-02-26 RX ADMIN — CEFAZOLIN 2 G: 2 INJECTION, POWDER, FOR SOLUTION INTRAMUSCULAR; INTRAVENOUS at 01:02

## 2025-02-26 RX ADMIN — FAMOTIDINE 20 MG: 20 TABLET, FILM COATED ORAL at 05:02

## 2025-02-26 RX ADMIN — DOCUSATE SODIUM 200 MG: 100 CAPSULE, LIQUID FILLED ORAL at 05:02

## 2025-02-26 RX ADMIN — LOSARTAN POTASSIUM 50 MG: 50 TABLET, FILM COATED ORAL at 07:02

## 2025-02-26 RX ADMIN — HYDROCODONE BITARTRATE AND ACETAMINOPHEN 1 TABLET: 5; 325 TABLET ORAL at 07:02

## 2025-02-26 RX ADMIN — ASPIRIN 81 MG: 81 TABLET, CHEWABLE ORAL at 07:02

## 2025-02-26 RX ADMIN — LEVOTHYROXINE SODIUM 100 MCG: 100 TABLET ORAL at 06:02

## 2025-02-26 RX ADMIN — METOCLOPRAMIDE HYDROCHLORIDE 10 MG: 5 INJECTION INTRAMUSCULAR; INTRAVENOUS at 01:02

## 2025-02-26 RX ADMIN — KETOROLAC TROMETHAMINE 10 MG: 10 TABLET, FILM COATED ORAL at 05:02

## 2025-02-26 NOTE — NURSING
2/26/2025   9:49 AM    Nurse Note:       Prior to discharge, the Patient/Support Person was educated and was able to verbalize understanding on following:    [x] Yes   [] No   [] Further Education Provided    Knee Replacement Specific Education   Pain management, Medication Management and Prescriptions  Activity level  Orthopedic precautions/braces - N/A  Complication Prevention to include: Constipation, post-op urinary retention, pneumonia, bleeding, falls, infection, DVT prophylaxis and nausea vomiting  Wound care Instructions/Bandages provided  Next dose due for pain and complication prevention medications      Perception of Care - Joint Replacement Population Total Joint Surgery List: KNEE    Patient able to verbalize one way to treat/prevent SWELLING at home   [x] Yes   [] No   [] Further Education Provided      Attending Nurse:  Ary      Discharge instructions given to patient. Prescription for outpatient therapy given. Coverlets and tape given for home dressing changes. Stated understanding of all instructions.

## 2025-02-26 NOTE — PT/OT/SLP PROGRESS
Physical Therapy Treatment    Patient Name:  Maria E Lopez   MRN:  [id number]    Recommendations:     Discharge Recommendations: Low Intensity Therapy  Discharge Equipment Recommendations: walker, rolling  Barriers to discharge: None    Assessment:     Maria E Lopez is a 49 y.o. female admitted with a medical diagnosis of Primary osteoarthritis of left knee.  She presents with the following impairments/functional limitations: decreased lower extremity function, pain, decreased ROM, edema, orthopedic precautions .    Rehab Prognosis: Good; patient would benefit from acute skilled PT services to address these deficits and reach maximum level of function.    Recent Surgery: Procedure(s) (LRB):  SDD- ROBOTIC ARTHROPLASTY, KNEE,  UNICOMPARTMENTAL #3 (Left) 1 Day Post-Op    Plan:     During this hospitalization, patient to be seen   to address the identified rehab impairments via gait training, therapeutic activities, therapeutic exercises and progress toward the following goals:    Plan of Care Expires:  03/03/25  Pt tolerated session well, educated on safety awareness, proper hydration, HEP, mobility, and pain management strategies to reduce risk of medical complications upon d/c home. All questions/concerns addressed. This note to serve as d/c summary.     Subjective     Chief Complaint: L knee pain  Patient/Family Comments/goals:   Pain/Comfort:  Location - Side 1: Left  Location 1: knee  Pain Addressed 1: Pre-medicate for activity, Reposition, Distraction      Objective:     Communicated with nurse prior to session.  Patient found supine with peripheral IV, telemetry upon PT entry to room.     General Precautions: Standard, fall  Orthopedic Precautions: LLE weight bearing as tolerated  Braces: N/A  Respiratory Status: Room air     Functional Mobility:  Bed Mobility:     Supine to Sit: supervision  Transfers:     Sit to Stand:  supervision with rolling walker  Gait: Pt ambulated 200 ft w rw and  supervision, using step through gait pattern at normal pace. Pt performed well with no LOB      (In degrees) AROM PROM   L knee flexion 100 105   L knee extension 0            Treatment & Education:  Pt edu on total knee protocol and importance of frequent mobility  Pt completed seated TKA therex x10 AAROM    Patient left up in chair with all lines intact, call button in reach, and nurse notified..    GOALS:   Multidisciplinary Problems       Physical Therapy Goals          Problem: Physical Therapy    Goal Priority Disciplines Outcome Interventions   Physical Therapy Goal     PT, PT/OT Progressing    Description: Pt will improve functional independence by performing:    Bed mobility: SBA MET  Sit to stand: SBA with rolling walker MET  Bed to chair: SBA with Stand Step  with rolling walker MET  Car Transfer: SBA with rolling walker MET  Ambulation x 200'  feet with SBA and rolling walker MET  1 Step (Curb): Min A  and rolling walker MET  3 Steps: Min A  and B HR MET  left knee AROM flexion (in degrees): 90 MET  left knee AROM extension (in degrees): 0 MET  Independent with total knee HEP MET                       DME Justifications:   Maria E's mobility limitation cannot be sufficiently resolved by the use of a cane. Her functional mobility deficit can be sufficiently resolved with the use of a Rolling Walker. Patient's mobility limitation significantly impairs their ability to participate in one of more activities of daily living.  The use of a RW will significantly improve the patient's ability to participate in MRADLS and the patient will use it on regular basis in the home.    Time Tracking:     PT Received On:    PT Start Time: 0835     PT Stop Time: 0847  PT Total Time (min): 12 min     Billable Minutes: Gait Training 12    Treatment Type: Treatment  PT/PTA: PT     Number of PTA visits since last PT visit: 0     02/26/2025

## 2025-02-26 NOTE — PLAN OF CARE
Problem: Adult Inpatient Plan of Care  Goal: Plan of Care Review  Outcome: Met  Goal: Patient-Specific Goal (Individualized)  Outcome: Met  Goal: Absence of Hospital-Acquired Illness or Injury  Outcome: Met  Goal: Optimal Comfort and Wellbeing  Outcome: Met  Goal: Readiness for Transition of Care  Outcome: Met     Problem: Wound  Goal: Optimal Coping  Outcome: Met  Goal: Optimal Functional Ability  Outcome: Met  Goal: Absence of Infection Signs and Symptoms  Outcome: Met  Goal: Improved Oral Intake  Outcome: Met  Goal: Optimal Pain Control and Function  Outcome: Met  Goal: Skin Health and Integrity  Outcome: Met  Goal: Optimal Wound Healing  Outcome: Met     Problem: Infection  Goal: Absence of Infection Signs and Symptoms  Outcome: Met     Problem: Fall Injury Risk  Goal: Absence of Fall and Fall-Related Injury  Outcome: Met     Problem: Knee Arthroplasty  Goal: Optimal Coping  Outcome: Met  Goal: Absence of Bleeding  Outcome: Met  Goal: Effective Bowel Elimination  Outcome: Met  Goal: Fluid and Electrolyte Balance  Outcome: Met  Goal: Optimal Functional Ability  Outcome: Met  Goal: Absence of Infection Signs and Symptoms  Outcome: Met  Goal: Intact Neurovascular Status  Outcome: Met  Goal: Anesthesia/Sedation Recovery  Outcome: Met  Goal: Optimal Pain Control and Function  Outcome: Met  Goal: Nausea and Vomiting Relief  Outcome: Met  Goal: Effective Urinary Elimination  Outcome: Met  Goal: Effective Oxygenation and Ventilation  Outcome: Met     Problem: Obstructive Sleep Apnea Risk or Actual  Goal: Unobstructed Breathing During Sleep  Outcome: Met

## 2025-02-26 NOTE — PLAN OF CARE
Problem: Adult Inpatient Plan of Care  Goal: Absence of Hospital-Acquired Illness or Injury  2/26/2025 0208 by Ludy Tadeo RN  Outcome: Progressing  2/26/2025 0207 by Ludy Tadeo RN  Outcome: Progressing  Goal: Optimal Comfort and Wellbeing  2/26/2025 0208 by Ludy Tadeo RN  Outcome: Progressing  2/26/2025 0207 by Ludy Tadeo RN  Outcome: Progressing  Goal: Readiness for Transition of Care  2/26/2025 0208 by Ludy Tadeo RN  Outcome: Progressing  2/26/2025 0207 by Ludy Tadeo RN  Outcome: Progressing     Problem: Infection  Goal: Absence of Infection Signs and Symptoms  2/26/2025 0208 by Ludy Tadeo RN  Outcome: Progressing  2/26/2025 0207 by Ludy Tadeo RN  Outcome: Progressing     Problem: Fall Injury Risk  Goal: Absence of Fall and Fall-Related Injury  2/26/2025 0208 by Ludy Tadeo RN  Outcome: Progressing  2/26/2025 0207 by Ludy Tadeo RN  Outcome: Progressing     Problem: Knee Arthroplasty  Goal: Optimal Coping  2/26/2025 0208 by Ludy Tadeo RN  Outcome: Progressing  2/26/2025 0207 by Ludy Tadeo RN  Outcome: Progressing  Goal: Absence of Bleeding  2/26/2025 0208 by Ludy Tadeo RN  Outcome: Progressing  2/26/2025 0207 by Ludy Tadeo RN  Outcome: Progressing  Goal: Fluid and Electrolyte Balance  Outcome: Progressing  Goal: Optimal Functional Ability  Outcome: Progressing  Goal: Absence of Infection Signs and Symptoms  2/26/2025 0208 by Ludy Tadeo RN  Outcome: Progressing  2/26/2025 0207 by Ludy Tadeo RN  Outcome: Progressing  Goal: Intact Neurovascular Status  2/26/2025 0208 by Ludy Tadeo RN  Outcome: Progressing  2/26/2025 0207 by Ludy Tadeo RN  Outcome: Progressing  Goal: Optimal Pain Control and Function  2/26/2025 0208 by Ludy Tadeo RN  Outcome: Progressing  2/26/2025 0207 by Ludy Tadeo RN  Outcome: Progressing  Goal: Effective Urinary Elimination  2/26/2025 0208 by Ludy Tadeo RN  Outcome:  Progressing  2/26/2025 0207 by Ludy Tadeo, RN  Outcome: Progressing  Goal: Effective Oxygenation and Ventilation  Outcome: Progressing     Problem: Obstructive Sleep Apnea Risk or Actual  Goal: Unobstructed Breathing During Sleep  2/26/2025 0208 by Ludy Tadeo, RN  Outcome: Progressing  2/26/2025 0207 by Ludy Tadeo, RN  Outcome: Progressing

## 2025-02-26 NOTE — DISCHARGE SUMMARY
[facility]  Discharge Note  Short Stay    Admit Date: 2/25/2025    Discharge Date and Time: 2/26/2025 10:10 AM     Attending Physician: No att. providers found     Discharge Provider: Conrad Reyes    Diagnoses:  Active Hospital Problems    Diagnosis  POA    *Primary osteoarthritis of left knee [M17.12]  Yes      Resolved Hospital Problems   No resolved problems to display.       Discharged Condition: good    Hospital Course:    Patient presented for elective robotic assisted left medial compartment partial knee arthroplasty The patient had no complications during the hospital stay and was discharged home in stable condition full weightbearing with assistance of a walker. The patient was given a consult for physical therapy and rehab as well as a follow-up appointment in our office in 2 weeks for reevaluation. The patient was instructed on wound care and dressing change as well as to continue the GEOVANNY hose while at home. Prescriptions for continuation of the VTE prophylaxis and pain control were given. The patients home medications were resumed please see discharge med rec for that.     Final Diagnoses: Same as principal problem.    Disposition: Home or Self Care    Follow up/Patient Instructions:    Medications:  Reconciled Home Medications:      Medication List        CONTINUE taking these medications      acetaminophen 500 MG tablet  Commonly known as: TYLENOL  Take 500 mg by mouth every 6 (six) hours as needed for Pain.     aspirin 81 MG Chew  Take 1 tablet (81 mg total) by mouth 2 (two) times a day.     Lactobacillus rhamnosus GG 10 billion cell capsule  Commonly known as: CULTURELLE  Take 1 capsule by mouth once daily.     levothyroxine 100 MCG tablet  Commonly known as: SYNTHROID  Take 1 tablet by mouth once daily     LILETTA 20.4 mcg/24 hr (8 yrs) 52 mg IUD  Generic drug: levonorgestreL  52 mg by Intrauterine route.     losartan 50 MG tablet  Commonly known as: COZAAR  Take 1 tablet (50 mg total)  by mouth once daily.     rosuvastatin 20 MG tablet  Commonly known as: CRESTOR  Take 1 tablet by mouth once daily            Discharge Procedure Orders   Keep surgical extremity elevated     Ice to affected area   Order Comments: using barrier between ice and skin (specify duration&frequency)     No driving, operating heavy equipment or signing legal documents while taking pain medication     Wound care routine (specify)   Order Comments: Wound care routine:  Keep surgical dressing is dry.  Replace in one-week with island dressing every other day unless soiled then daily     Activity as tolerated     Shower on day dressing removed (No bath)      Follow-up Information       Jorge A Kingsley MD. Go on 3/12/2025.    Specialty: Orthopedic Surgery  Why: Ortho follow up appt on Wednesday 03/12/25 @ 1:30pm w /Conrad (Dr Kingsley's Phy Assistant)  Contact information:  [address]  [address] [place] [id number]  [phone]                             Discharge Procedure Orders (must include Diet, Follow-up, Activity):   Discharge Procedure Orders (must include Diet, Follow-up, Activity)   Keep surgical extremity elevated     Ice to affected area   Order Comments: using barrier between ice and skin (specify duration&frequency)     No driving, operating heavy equipment or signing legal documents while taking pain medication     Wound care routine (specify)   Order Comments: Wound care routine:  Keep surgical dressing is dry.  Replace in one-week with island dressing every other day unless soiled then daily     Activity as tolerated     Shower on day dressing removed (No bath)

## 2025-02-26 NOTE — PROGRESS NOTES
"No acute events overnight.  Pain controlled.  Resting in bed.    Vital Signs  Temp: 97.8 °F (36.6 °C)  Temp Source: Oral  Pulse: (!) 58  Heart Rate Source: Monitor  Resp: 18  SpO2: 98 %  Pulse Oximetry Type: Continuous  Flow (L/min) (Oxygen Therapy): 10  Oxygen Concentration (%): 80  Device (Oxygen Therapy): room air  BP: 109/76  BP Location: Left arm  BP Method: Automatic  Patient Position: Lying  Height and Weight  Height: 5' 6" (167.6 cm)  Height Method: Stated  Weight: 100.3 kg (221 lb 1.9 oz)  Weight Method: Standard Scale  BSA (Calculated - sq m): 2.16 sq meters  BMI (Calculated): 35.7  Weight in (lb) to have BMI = 25: 154.6]    +FHL/EHL  Brisk capillary refill distally  Dressing c/d/i  Sensation intact to light touch distally    Postop radiographs reviewed and looks good with no evidence of fracture or dislocation.    Recent Lab Results         02/25/25  1125        POCT Glucose 98               A/P:  Status post left medial compartment partial knee arthroplasty  Overall patient doing well.  Therapy for mobility and ambulation.  Aspirin for DVT PPx  Patient is doing well and ambulating well this morning.  She will be discharged home this morning.  She was admitted for observation last night secondary to urinary retention but was able to void independently beginning at 8:00 p.m. last night without difficulty.  "

## 2025-02-26 NOTE — PLAN OF CARE
Problem: Physical Therapy  Goal: Physical Therapy Goal  Description: Pt will improve functional independence by performing:    Bed mobility: SBA MET  Sit to stand: SBA with rolling walker MET  Bed to chair: SBA with Stand Step  with rolling walker MET  Car Transfer: SBA with rolling walker MET  Ambulation x 200'  feet with SBA and rolling walker MET  1 Step (Curb): Min A  and rolling walker MET  3 Steps: Min A  and B HR MET  left knee AROM flexion (in degrees): 90 MET  left knee AROM extension (in degrees): 0 MET  Independent with total knee HEP MET  Outcome: Progressing

## 2025-02-26 NOTE — PROGRESS NOTES
Patient experiencing Post-op urinary retention. Will convert to observation status, start medication for urinary retention and instruct nursing staff to follow our POUR and our I&Out cath protocol. Will plan for discharge once the patient is voiding appropriately and at baseline.     Co-morbidities mgt:   Hypothyroidism - home meds restarted    Hypertension - Home meds restarted, monitor closely and adjust plan of care accordingly.     Hyperlipidemia - statin on hold due to hospital inventory/substitutions    Obesity - BMI 36    Sleep Apnea -  Oxygen PRN, Monitor oxygen saturation    Chronic Constipation - MiraLax/Reglan/Colace/Senokot as per constipation protocol.  Patient educated on increased risk of constipation with pain pills and immobility.  Verbalized understanding

## 2025-02-26 NOTE — PLAN OF CARE
Problem: Adult Inpatient Plan of Care  Goal: Plan of Care Review  Outcome: Progressing  Goal: Patient-Specific Goal (Individualized)  Outcome: Progressing  Goal: Absence of Hospital-Acquired Illness or Injury  Outcome: Progressing  Goal: Optimal Comfort and Wellbeing  Outcome: Progressing  Goal: Readiness for Transition of Care  Outcome: Progressing     Problem: Wound  Goal: Optimal Coping  Outcome: Progressing  Goal: Optimal Functional Ability  Outcome: Progressing  Goal: Absence of Infection Signs and Symptoms  Outcome: Progressing  Goal: Improved Oral Intake  Outcome: Progressing  Goal: Optimal Pain Control and Function  Outcome: Progressing  Goal: Skin Health and Integrity  Outcome: Progressing  Goal: Optimal Wound Healing  Outcome: Progressing     Problem: Infection  Goal: Absence of Infection Signs and Symptoms  Outcome: Progressing     Problem: Fall Injury Risk  Goal: Absence of Fall and Fall-Related Injury  Outcome: Progressing     Problem: Knee Arthroplasty  Goal: Optimal Coping  Outcome: Progressing  Goal: Absence of Bleeding  Outcome: Progressing  Goal: Effective Bowel Elimination  Outcome: Progressing  Goal: Fluid and Electrolyte Balance  Outcome: Progressing  Goal: Optimal Functional Ability  Outcome: Progressing  Goal: Absence of Infection Signs and Symptoms  Outcome: Progressing  Goal: Intact Neurovascular Status  Outcome: Progressing  Goal: Anesthesia/Sedation Recovery  Outcome: Progressing  Goal: Optimal Pain Control and Function  Outcome: Progressing  Goal: Nausea and Vomiting Relief  Outcome: Progressing  Goal: Effective Urinary Elimination  Outcome: Progressing  Goal: Effective Oxygenation and Ventilation  Outcome: Progressing     Problem: Obstructive Sleep Apnea Risk or Actual  Goal: Unobstructed Breathing During Sleep  Outcome: Progressing

## 2025-02-26 NOTE — ANESTHESIA POSTPROCEDURE EVALUATION
Anesthesia Post Evaluation    Patient: Maria E Lopez    Procedure(s) Performed: Procedure(s) (LRB):  SDD- ROBOTIC ARTHROPLASTY, KNEE,  UNICOMPARTMENTAL #3 (Left)    Final Anesthesia Type: spinal      Patient location during evaluation: floor  Patient participation: Yes- Able to Participate  Level of consciousness: awake and alert and oriented  Post-procedure vital signs: reviewed and stable  Pain management: adequate  Airway patency: patent    PONV status at discharge: No PONV, nausea (controlled) and vomiting (controlled)  Anesthetic complications: no      Cardiovascular status: blood pressure returned to baseline and stable  Respiratory status: unassisted    Comments: SENSORI-MOTOR INTACT              Vitals Value Taken Time   /68 02/25/25 17:32   Temp 36.4 °C (97.5 °F) 02/25/25 17:32   Pulse 54 02/25/25 17:32   Resp 20 02/25/25 15:45   SpO2 99 % 02/25/25 17:32         Event Time   Out of Recovery 12:06:51         Pain/Balaji Score: Pain Rating Prior to Med Admin: 2 (2/25/2025  6:08 PM)  Balaji Score: 9 (2/25/2025 12:23 PM)  Modified Balaji Score: 17 (2/25/2025 12:23 PM)

## 2025-02-27 ENCOUNTER — PATIENT OUTREACH (OUTPATIENT)
Dept: ADMINISTRATIVE | Facility: CLINIC | Age: 50
End: 2025-02-27
Payer: COMMERCIAL

## 2025-02-27 ENCOUNTER — PATIENT MESSAGE (OUTPATIENT)
Dept: ADMINISTRATIVE | Facility: CLINIC | Age: 50
End: 2025-02-27
Payer: COMMERCIAL

## 2025-02-27 ENCOUNTER — TELEPHONE (OUTPATIENT)
Dept: ORTHOPEDICS | Facility: CLINIC | Age: 50
End: 2025-02-27
Payer: COMMERCIAL

## 2025-02-27 NOTE — TELEPHONE ENCOUNTER
Ortho post op follow up call completed. (PKA) Patient states pain and swelling are manageable and will start outpatient therapy at [facility] on [address] tomorrow. Pt is wearing GEOVANNY hose, ace bandage and using ice PRN swelling. Reminded to elevate operative leg higher than heart level 20-30 minutes daily and post op follow up appointment is 3-12 at 1:30 with Conrad, verbalized understanding. Encouraged to call with any questions or concerns.

## 2025-02-27 NOTE — PROGRESS NOTES
C3 nurse spoke with Maria E Lopez  for a TCC post hospital discharge follow up call. The patient has a scheduled HOSFU appointment with Dr. Kingsley's PA, Conrad Reyes on 03/12/2025 @ 130 pm. The patient does not have a scheduled HOSFU appointment with Bob Ontiveros MD  within 5-7 days post hospital discharge date 02/26/2025. Message sent to PCP staff.          Patient stated taking;  Methocarbamol 750 mg (1) three times a day for 7 days  Hydrocodone-Acetaminophen 7.5/325 mg (1) every 4 hours as needed for pain

## 2025-02-27 NOTE — PROGRESS NOTES
C3 nurse attempted to contact Maria E Lopez  for a TCC post hospital discharge follow up call. No answer. Left voicemail with callback information.  The patient does not have a scheduled HOSFU appointment with Bob Ontiveros MD  within 5-7 days post hospital discharge date 02/26/2025. C3 nurse was unable to schedule HOSFU appointment in [facility].    Message sent to PCP staff requesting they contact patient and schedule follow up appointment.     Message sent via patient's portal regarding follow up call.

## 2025-03-03 ENCOUNTER — TELEPHONE (OUTPATIENT)
Dept: FAMILY MEDICINE | Facility: CLINIC | Age: 50
End: 2025-03-03

## 2025-03-07 ENCOUNTER — PATIENT MESSAGE (OUTPATIENT)
Dept: ORTHOPEDICS | Facility: CLINIC | Age: 50
End: 2025-03-07
Payer: COMMERCIAL

## 2025-03-12 ENCOUNTER — OFFICE VISIT (OUTPATIENT)
Dept: ORTHOPEDICS | Facility: CLINIC | Age: 50
End: 2025-03-12
Payer: COMMERCIAL

## 2025-03-12 ENCOUNTER — HOSPITAL ENCOUNTER (OUTPATIENT)
Dept: RADIOLOGY | Facility: CLINIC | Age: 50
Discharge: HOME | End: 2025-03-12
Attending: PHYSICIAN ASSISTANT
Payer: COMMERCIAL

## 2025-03-12 ENCOUNTER — PATIENT MESSAGE (OUTPATIENT)
Dept: ADMINISTRATIVE | Facility: OTHER | Age: 50
End: 2025-03-12
Payer: COMMERCIAL

## 2025-03-12 ENCOUNTER — TELEPHONE (OUTPATIENT)
Dept: ORTHOPEDICS | Facility: CLINIC | Age: 50
End: 2025-03-12

## 2025-03-12 VITALS
BODY MASS INDEX: 35.52 KG/M2 | WEIGHT: 221 LBS | HEART RATE: 76 BPM | HEIGHT: 66 IN | DIASTOLIC BLOOD PRESSURE: 86 MMHG | SYSTOLIC BLOOD PRESSURE: 122 MMHG

## 2025-03-12 DIAGNOSIS — Z96.652 S/P LEFT UNICOMPARTMENTAL KNEE REPLACEMENT: ICD-10-CM

## 2025-03-12 DIAGNOSIS — M17.12 PRIMARY OSTEOARTHRITIS OF LEFT KNEE: ICD-10-CM

## 2025-03-12 DIAGNOSIS — Z96.652 AFTERCARE FOLLOWING LEFT KNEE JOINT REPLACEMENT SURGERY: Primary | ICD-10-CM

## 2025-03-12 DIAGNOSIS — Z47.1 AFTERCARE FOLLOWING LEFT KNEE JOINT REPLACEMENT SURGERY: Primary | ICD-10-CM

## 2025-03-12 PROCEDURE — 3066F NEPHROPATHY DOC TX: CPT | Mod: CPTII,,, | Performed by: PHYSICIAN ASSISTANT

## 2025-03-12 PROCEDURE — 1160F RVW MEDS BY RX/DR IN RCRD: CPT | Mod: CPTII,,, | Performed by: PHYSICIAN ASSISTANT

## 2025-03-12 PROCEDURE — 3074F SYST BP LT 130 MM HG: CPT | Mod: CPTII,,, | Performed by: PHYSICIAN ASSISTANT

## 2025-03-12 PROCEDURE — 1159F MED LIST DOCD IN RCRD: CPT | Mod: CPTII,,, | Performed by: PHYSICIAN ASSISTANT

## 2025-03-12 PROCEDURE — 4010F ACE/ARB THERAPY RXD/TAKEN: CPT | Mod: CPTII,,, | Performed by: PHYSICIAN ASSISTANT

## 2025-03-12 PROCEDURE — 99024 POSTOP FOLLOW-UP VISIT: CPT | Mod: ,,, | Performed by: PHYSICIAN ASSISTANT

## 2025-03-12 PROCEDURE — 3079F DIAST BP 80-89 MM HG: CPT | Mod: CPTII,,, | Performed by: PHYSICIAN ASSISTANT

## 2025-03-12 PROCEDURE — 73562 X-RAY EXAM OF KNEE 3: CPT | Mod: LT,,, | Performed by: PHYSICIAN ASSISTANT

## 2025-03-12 PROCEDURE — 3044F HG A1C LEVEL LT 7.0%: CPT | Mod: CPTII,,, | Performed by: PHYSICIAN ASSISTANT

## 2025-03-12 NOTE — PROGRESS NOTES
Chief Complaint:   Chief Complaint   Patient presents with    Post-op Evaluation     Post-op LT medical PKA 02/25/2025 - Patient reports soreness/tenderness in the knee. Started PT 02/27/2025, going 3 time a week with relief. Takes hydrocodone PRN. Reports swelling in the knee, is icing the knee 3-4 times a day.        History of present illness:    49-year-old female presents office today for follow-up on her left knee.  She is 2 weeks S/P left medial compartment partial knee arthroplasty.  Overall she is doing well with mild pain and swelling.  She does have some noted weakness to her quads.  She is ambulating with a cane and working with physical therapy.  Ice and medication are helping    Past Medical History:   Diagnosis Date    Chronic constipation     Hashimoto's thyroiditis     Hyperlipidemia     Hypertension     Hypothyroidism     Obesity     Osteoarthritis     Prediabetes     controlled with diet    Renal disorder     autoimmune disorder due to increased levels protein    Sleep apnea     does not use any machine    Torn meniscus        Past Surgical History:   Procedure Laterality Date    BIOPSY OF LESION      COLONOSCOPY      KNEE ARTHROSCOPY W/ MENISCECTOMY Left 08/01/2024    Procedure: ARTHROSCOPY, KNEE, WITH MENISCECTOMY;  Surgeon: Jorge A Kingsley MD;  Location: [facility];  Service: Orthopedics;  Laterality: Left;  left knee ats    ROBOTIC ARTHROPLASTY, KNEE, UNICOMPARTMENTAL Left 2/25/2025    Procedure: SDD- ROBOTIC ARTHROPLASTY, KNEE,  UNICOMPARTMENTAL #3;  Surgeon: Jorge A Kingsley MD;  Location: [facility] OR;  Service: Orthopedics;  Laterality: Left;  Blake- left medial component    rt knee arthroscopy      TOTAL KNEE ARTHROPLASTY Right     partial       Current Outpatient Medications   Medication Sig    acetaminophen (TYLENOL) 500 MG tablet Take 500 mg by mouth every 6 (six) hours as needed for Pain.    aspirin 81 MG Chew Take 1 tablet (81 mg total) by mouth 2 (two) times a day.    Lactobacillus rhamnosus  "GG (CULTURELLE) 10 billion cell capsule Take 1 capsule by mouth once daily.    levonorgestreL (LILETTA) 20.1 mcg/24 hrs (6 yrs) 52 mg IUD 52 mg by Intrauterine route.    levothyroxine (SYNTHROID) 100 MCG tablet Take 1 tablet by mouth once daily    losartan (COZAAR) 50 MG tablet Take 1 tablet (50 mg total) by mouth once daily.    rosuvastatin (CRESTOR) 20 MG tablet Take 1 tablet by mouth once daily     No current facility-administered medications for this visit.       Review of patient's allergies indicates:  No Known Allergies    Family History   Problem Relation Name Age of Onset    Thyroid disease Mother Nohemy Pena     Diabetes Mother Nohemy Pena     Hypertension Mother Nohemy Pena     Arthritis Mother Nohemy Pena     Liver cancer Father Santiago Pena     Heart disease Father Santiago Pena     COPD Father Santiago Pena     Alcohol abuse Father Santiago Pena     Heart disease Paternal Grandfather Colicarrie [place]     Cancer Paternal Grandmother [facility]     Stroke Maternal Grandfather Hood Galeana        Social History[1]      Review of Systems:    Constitution:   Denies chills, fever, and sweats.  HENT:   Denies headaches or blurry vision.  Cardiovascular:  Denies chest pain or irregular heart beat.  Respiratory:   Denies cough or shortness of breath.  Gastrointestinal:  Denies abdominal pain, nausea, or vomiting.  Musculoskeletal:   Denies muscle cramps.  Neurological:   Denies dizziness or focal weakness.  Psychiatric/Behavior: Normal mental status.  Hematology/Lymph:  Denies bleeding problem or easy bruising/bleeding.  Skin:    Denies rash or suspicious lesions.    Examination:    Vital Signs:    Vitals:    03/12/25 1334   BP: 122/86   Pulse: 76   Weight: 100.2 kg (221 lb)   Height: 5' 6" (1.676 m)       Body mass index is 35.67 kg/m².    Constitution:   Well-developed, well nourished patient in no acute distress.  Neurological:   Alert and oriented x 3 and cooperative to " examination.     Psychiatric/Behavior: Normal mental status.  Respiratory:   No shortness of breath.  Nonlabored breathing  Cardiovascular:           Regular rate and rhythm  Eyes:    Extraoccular muscles intact  Skin:    No scars, rash or suspicious lesions.    Physical Exam:     Left Knee     Mild effusion, no redness    Surgical incision C/D/I with staples   Active flexion 100 degrees   Active extension 0 degrees    Thigh and calf compartments soft and compressible    NVI distally Weakness of the knee was observed.    X-Ray Knee:   Three views of the left knee were performed   IMPRESSIONS RADIOLOGY TEST     X-ray of knee was performed intact  knee implant        Assessment:     Aftercare following left knee joint replacement surgery  -     X-Ray Knee 3 View Left; Future; Expected date: 03/12/2025  -     Ambulatory Referral/Consult to Physical Therapy/Occupational Therapy; Future; Expected date: 03/19/2025    S/P left unicompartmental knee replacement  -     Ambulatory Referral/Consult to Physical Therapy/Occupational Therapy; Future; Expected date: 03/19/2025        Plan:      Staples removed today, Steri-Strips applied.  Continue with physical therapy and the use of her cane.  Encouraged continued active quad strengthening at home.  We will see her back in 6 weeks' time for repeat evaluation for range-of-motion check and assessment of her quad tone.        Follow up in about 6 weeks (around 4/23/2025).    DISCLAIMER: This note may have been dictated using voice recognition software and may contain grammatical errors.          [1]   Social History  Socioeconomic History    Marital status:    Tobacco Use    Smoking status: Never     Passive exposure: Never    Smokeless tobacco: Never   Substance and Sexual Activity    Alcohol use: Never    Drug use: Never    Sexual activity: Yes     Partners: Male     Birth control/protection: I.U.D., None   Social History Narrative    The patient is  and has 3  children.  She works as a .     Social Drivers of Health     Financial Resource Strain: Medium Risk (4/8/2024)    Overall Financial Resource Strain (CARDIA)     Difficulty of Paying Living Expenses: Somewhat hard   Food Insecurity: No Food Insecurity (4/8/2024)    Hunger Vital Sign     Worried About Running Out of Food in the Last Year: Never true     Ran Out of Food in the Last Year: Never true   Transportation Needs: No Transportation Needs (4/8/2024)    PRAPARE - Transportation     Lack of Transportation (Medical): No     Lack of Transportation (Non-Medical): No   Physical Activity: Sufficiently Active (4/8/2024)    Exercise Vital Sign     Days of Exercise per Week: 3 days     Minutes of Exercise per Session: 60 min   Stress: No Stress Concern Present (4/8/2024)    [employer] - Occupational Stress Questionnaire     Feeling of Stress : Only a little   Housing Stability: Unknown (4/8/2024)    Housing Stability Vital Sign     Unable to Pay for Housing in the Last Year: No     Unstable Housing in the Last Year: No

## 2025-03-12 NOTE — TELEPHONE ENCOUNTER
Patient answered YES to post op questionnaire regarding redness, warmth, swelling, bleeding from incision site. Called to discuss and patient states the swelling is normal and denies any other s/s. Patient states she is using ice to help the swelling and continues to do therapy, walk and no concerns. Patient does have a post op follow up appointment this afternoon at 1:30 with Conrad. Encouraged to call with any questions or concerns.

## 2025-04-04 ENCOUNTER — LAB VISIT (OUTPATIENT)
Dept: LAB | Facility: HOSPITAL | Age: 50
End: 2025-04-04
Attending: NURSE PRACTITIONER
Payer: COMMERCIAL

## 2025-04-04 DIAGNOSIS — I10 PRIMARY HYPERTENSION: ICD-10-CM

## 2025-04-04 DIAGNOSIS — N02.2 MEMBRANOUS NEPHROPATHY DETERMINED BY BIOPSY: ICD-10-CM

## 2025-04-04 LAB
ALBUMIN SERPL-MCNC: 4.2 G/DL (ref 3.5–5)
ALBUMIN/GLOB SERPL: 1.4 RATIO (ref 1.1–2)
ALP SERPL-CCNC: 62 UNIT/L (ref 40–150)
ALT SERPL-CCNC: 9 UNIT/L (ref 0–55)
ANION GAP SERPL CALC-SCNC: 7 MEQ/L
AST SERPL-CCNC: 13 UNIT/L (ref 11–45)
BACTERIA #/AREA URNS AUTO: NORMAL /HPF
BASOPHILS # BLD AUTO: 0.04 X10(3)/MCL
BASOPHILS NFR BLD AUTO: 0.8 %
BILIRUB SERPL-MCNC: 1.2 MG/DL
BILIRUB UR QL STRIP.AUTO: NEGATIVE
BUN SERPL-MCNC: 11.3 MG/DL (ref 9.8–20.1)
CALCIUM SERPL-MCNC: 9.4 MG/DL (ref 8.4–10.2)
CHLORIDE SERPL-SCNC: 109 MMOL/L (ref 98–107)
CLARITY UR: CLEAR
CO2 SERPL-SCNC: 25 MMOL/L (ref 22–29)
COLOR UR AUTO: YELLOW
CREAT SERPL-MCNC: 0.84 MG/DL (ref 0.55–1.02)
CREAT UR-MCNC: 58.4 MG/DL (ref 45–106)
CREAT/UREA NIT SERPL: 13
EOSINOPHIL # BLD AUTO: 0.15 X10(3)/MCL (ref 0–0.9)
EOSINOPHIL NFR BLD AUTO: 3.2 %
ERYTHROCYTE [DISTWIDTH] IN BLOOD BY AUTOMATED COUNT: 14 % (ref 11.5–17)
GFR SERPLBLD CREATININE-BSD FMLA CKD-EPI: >60 ML/MIN/1.73/M2
GLOBULIN SER-MCNC: 2.9 GM/DL (ref 2.4–3.5)
GLUCOSE SERPL-MCNC: 108 MG/DL (ref 74–100)
GLUCOSE UR QL STRIP: NEGATIVE
HCT VFR BLD AUTO: 38.4 % (ref 37–47)
HGB BLD-MCNC: 12.3 G/DL (ref 12–16)
HGB UR QL STRIP: ABNORMAL
IMM GRANULOCYTES # BLD AUTO: 0 X10(3)/MCL (ref 0–0.04)
IMM GRANULOCYTES NFR BLD AUTO: 0 %
KETONES UR QL STRIP: NEGATIVE
LEUKOCYTE ESTERASE UR QL STRIP: NEGATIVE
LYMPHOCYTES # BLD AUTO: 2.68 X10(3)/MCL (ref 0.6–4.6)
LYMPHOCYTES NFR BLD AUTO: 56.9 %
MCH RBC QN AUTO: 28.2 PG (ref 27–31)
MCHC RBC AUTO-ENTMCNC: 32 G/DL (ref 33–36)
MCV RBC AUTO: 88.1 FL (ref 80–94)
MONOCYTES # BLD AUTO: 0.28 X10(3)/MCL (ref 0.1–1.3)
MONOCYTES NFR BLD AUTO: 5.9 %
NEUTROPHILS # BLD AUTO: 1.56 X10(3)/MCL (ref 2.1–9.2)
NEUTROPHILS NFR BLD AUTO: 33.2 %
NITRITE UR QL STRIP: NEGATIVE
NRBC BLD AUTO-RTO: 0 %
PH UR STRIP: 6 [PH]
PLATELET # BLD AUTO: 248 X10(3)/MCL (ref 130–400)
PMV BLD AUTO: 10.5 FL (ref 7.4–10.4)
POTASSIUM SERPL-SCNC: 4.1 MMOL/L (ref 3.5–5.1)
PROT SERPL-MCNC: 7.1 GM/DL (ref 6.4–8.3)
PROT UR QL STRIP: NEGATIVE
PROT UR STRIP-MCNC: <6.8 MG/DL
RBC # BLD AUTO: 4.36 X10(6)/MCL (ref 4.2–5.4)
RBC #/AREA URNS AUTO: NORMAL /HPF
SODIUM SERPL-SCNC: 141 MMOL/L (ref 136–145)
SP GR UR STRIP.AUTO: <=1.005 (ref 1–1.03)
SQUAMOUS #/AREA URNS AUTO: NORMAL /HPF
UROBILINOGEN UR STRIP-ACNC: 0.2
WBC # BLD AUTO: 4.71 X10(3)/MCL (ref 4.5–11.5)
WBC #/AREA URNS AUTO: NORMAL /HPF

## 2025-04-04 PROCEDURE — 85025 COMPLETE CBC W/AUTO DIFF WBC: CPT

## 2025-04-04 PROCEDURE — 36415 COLL VENOUS BLD VENIPUNCTURE: CPT

## 2025-04-04 PROCEDURE — 82570 ASSAY OF URINE CREATININE: CPT

## 2025-04-04 PROCEDURE — 80053 COMPREHEN METABOLIC PANEL: CPT

## 2025-04-04 PROCEDURE — 81003 URINALYSIS AUTO W/O SCOPE: CPT

## 2025-04-23 ENCOUNTER — OFFICE VISIT (OUTPATIENT)
Dept: ORTHOPEDICS | Facility: CLINIC | Age: 50
End: 2025-04-23
Payer: COMMERCIAL

## 2025-04-23 VITALS
HEIGHT: 66 IN | BODY MASS INDEX: 35.84 KG/M2 | HEART RATE: 80 BPM | DIASTOLIC BLOOD PRESSURE: 89 MMHG | WEIGHT: 223 LBS | SYSTOLIC BLOOD PRESSURE: 135 MMHG

## 2025-04-23 DIAGNOSIS — Z96.652 S/P LEFT UNICOMPARTMENTAL KNEE REPLACEMENT: Primary | ICD-10-CM

## 2025-04-23 RX ORDER — HYDROCODONE BITARTRATE AND ACETAMINOPHEN 7.5; 325 MG/1; MG/1
1 TABLET ORAL EVERY 6 HOURS PRN
COMMUNITY

## 2025-04-23 NOTE — PROGRESS NOTES
Chief Complaint:   Chief Complaint   Patient presents with    Left Knee - Follow-up     Lt PKA sx 2/25/25 OOGL - Pt states she's doing better. Constant aching pains, soreness after therapy - was going 3x a week. Taking NORCO about 3x a week. Stiffness in the morning, feels weak.        History of present illness:    49 year old female presents today for a follow-up on her left knee.  She is 2 months S/P left medial compartment partial knee arthroplasty.  Overall she is doing much better.  Her quad tone has improved.  She still has some soreness pain and working with physical therapy.    Past Medical History:   Diagnosis Date    Chronic constipation     Hashimoto's thyroiditis     Hyperlipidemia     Hypertension     Hypothyroidism     Obesity     Osteoarthritis     Prediabetes     controlled with diet    Renal disorder     autoimmune disorder due to increased levels protein    Sleep apnea     does not use any machine    Torn meniscus        Past Surgical History:   Procedure Laterality Date    BIOPSY OF LESION      COLONOSCOPY      KNEE ARTHROSCOPY W/ MENISCECTOMY Left 08/01/2024    Procedure: ARTHROSCOPY, KNEE, WITH MENISCECTOMY;  Surgeon: Jorge A Kingsley MD;  Location: [facility] OR;  Service: Orthopedics;  Laterality: Left;  left knee ats    ROBOTIC ARTHROPLASTY, KNEE, UNICOMPARTMENTAL Left 2/25/2025    Procedure: SDD- ROBOTIC ARTHROPLASTY, KNEE,  UNICOMPARTMENTAL #3;  Surgeon: Jorge A Kingsley MD;  Location: [facility] OR;  Service: Orthopedics;  Laterality: Left;  Blake- left medial component    rt knee arthroscopy      TOTAL KNEE ARTHROPLASTY Right     partial       Current Outpatient Medications   Medication Sig    acetaminophen (TYLENOL) 500 MG tablet Take 500 mg by mouth every 6 (six) hours as needed for Pain.    HYDROcodone-acetaminophen (NORCO) 7.5-325 mg per tablet Take 1 tablet by mouth every 6 (six) hours as needed for Pain.    Lactobacillus rhamnosus GG (CULTURELLE) 10 billion cell capsule Take 1 capsule by mouth  As per Dr. Julieta Bailon, mailed to patient   Referral to [facility] MD Shahzad [phone] "once daily.    levonorgestreL (LILETTA) 20.1 mcg/24 hrs (6 yrs) 52 mg IUD 52 mg by Intrauterine route.    levothyroxine (SYNTHROID) 100 MCG tablet Take 1 tablet by mouth once daily    losartan (COZAAR) 50 MG tablet Take 1 tablet (50 mg total) by mouth once daily.    rosuvastatin (CRESTOR) 20 MG tablet Take 1 tablet by mouth once daily    aspirin 81 MG Chew Take 1 tablet (81 mg total) by mouth 2 (two) times a day.     No current facility-administered medications for this visit.       Review of patient's allergies indicates:  No Known Allergies    Family History   Problem Relation Name Age of Onset    Thyroid disease Mother Nohemy Pena     Diabetes Mother Nohemy Pena     Hypertension Mother Nohemy Pena     Arthritis Mother Nohemy Pena     Liver cancer Father Santiago Pena     Heart disease Father Santiago Pena     COPD Father Santiago Pena     Alcohol abuse Father Santiago Pena     Heart disease Paternal Grandfather Colicarrie [place]     Cancer Paternal Grandmother [facility]     Stroke Maternal Grandfather Hood Kervin        Social History[1]      Review of Systems:    Constitution:   Denies chills, fever, and sweats.  HENT:   Denies headaches or blurry vision.  Cardiovascular:  Denies chest pain or irregular heart beat.  Respiratory:   Denies cough or shortness of breath.  Gastrointestinal:  Denies abdominal pain, nausea, or vomiting.  Musculoskeletal:   Denies muscle cramps.  Neurological:   Denies dizziness or focal weakness.  Psychiatric/Behavior: Normal mental status.  Hematology/Lymph:  Denies bleeding problem or easy bruising/bleeding.  Skin:    Denies rash or suspicious lesions.    Examination:    Vital Signs:    Vitals:    04/23/25 1048   BP: 135/89   Pulse: 80   Weight: 101.2 kg (223 lb)   Height: 5' 6" (1.676 m)       Body mass index is 35.99 kg/m².    Constitution:   Well-developed, well nourished patient in no acute distress.  Neurological:   Alert and oriented x 3 and " cooperative to examination.     Psychiatric/Behavior: Normal mental status.  Respiratory:   No shortness of breath.  Nonlabored breathing  Cardiovascular:           Regular rate and rhythm  Eyes:    Extraoccular muscles intact  Skin:    No scars, rash or suspicious lesions.    Physical Exam:     left Knee     No swelling, warmth    Well healed scar    No instability of the knee in mid or deep flexion     Active flexion 120 degrees     Active extension 0 degrees     Mild to moderate quad weakness observed.        Assessment:     S/P left unicompartmental knee replacement        Plan:      Continue with therapy and home exercises.  Activities as tolerated.  Follow up in 9 months for her yearly checkup with left knee radiographs needed at that time        No follow-ups on file.    DISCLAIMER: This note may have been dictated using voice recognition software and may contain grammatical errors.          [1]   Social History  Socioeconomic History    Marital status:    Tobacco Use    Smoking status: Never     Passive exposure: Never    Smokeless tobacco: Never   Substance and Sexual Activity    Alcohol use: Never    Drug use: Yes     Types: Hydrocodone    Sexual activity: Yes     Partners: Male     Birth control/protection: I.U.D., None   Social History Narrative    The patient is  and has 3 children.  She works as a .     Social Drivers of Health     Financial Resource Strain: Medium Risk (4/8/2024)    Overall Financial Resource Strain (CARDIA)     Difficulty of Paying Living Expenses: Somewhat hard   Food Insecurity: No Food Insecurity (4/8/2024)    Hunger Vital Sign     Worried About Running Out of Food in the Last Year: Never true     Ran Out of Food in the Last Year: Never true   Transportation Needs: No Transportation Needs (4/8/2024)    PRAPARE - Transportation     Lack of Transportation (Medical): No     Lack of Transportation (Non-Medical): No   Physical Activity: Sufficiently Active  (4/8/2024)    Exercise Vital Sign     Days of Exercise per Week: 3 days     Minutes of Exercise per Session: 60 min   Stress: No Stress Concern Present (4/8/2024)    [employer] [place] [employer] - Occupational Stress Questionnaire     Feeling of Stress : Only a little   Housing Stability: Unknown (4/8/2024)    Housing Stability Vital Sign     Unable to Pay for Housing in the Last Year: No     Unstable Housing in the Last Year: No

## 2025-05-01 ENCOUNTER — OFFICE VISIT (OUTPATIENT)
Dept: FAMILY MEDICINE | Facility: CLINIC | Age: 50
End: 2025-05-01
Payer: COMMERCIAL

## 2025-05-01 VITALS
OXYGEN SATURATION: 96 % | RESPIRATION RATE: 16 BRPM | SYSTOLIC BLOOD PRESSURE: 106 MMHG | HEART RATE: 84 BPM | BODY MASS INDEX: 35.99 KG/M2 | TEMPERATURE: 98 F | DIASTOLIC BLOOD PRESSURE: 78 MMHG | HEIGHT: 66 IN

## 2025-05-01 DIAGNOSIS — R73.03 PREDIABETES: ICD-10-CM

## 2025-05-01 DIAGNOSIS — K59.09 CHRONIC CONSTIPATION: ICD-10-CM

## 2025-05-01 DIAGNOSIS — Z12.31 ENCOUNTER FOR SCREENING MAMMOGRAM FOR BREAST CANCER: ICD-10-CM

## 2025-05-01 DIAGNOSIS — E66.01 SEVERE OBESITY (BMI 35.0-39.9) WITH COMORBIDITY: ICD-10-CM

## 2025-05-01 DIAGNOSIS — Z13.6 ENCOUNTER FOR LIPID SCREENING FOR CARDIOVASCULAR DISEASE: ICD-10-CM

## 2025-05-01 DIAGNOSIS — E03.9 ACQUIRED HYPOTHYROIDISM: ICD-10-CM

## 2025-05-01 DIAGNOSIS — M17.12 PRIMARY OSTEOARTHRITIS OF LEFT KNEE: ICD-10-CM

## 2025-05-01 DIAGNOSIS — Z12.4 CERVICAL CANCER SCREENING: ICD-10-CM

## 2025-05-01 DIAGNOSIS — Z13.220 ENCOUNTER FOR LIPID SCREENING FOR CARDIOVASCULAR DISEASE: ICD-10-CM

## 2025-05-01 DIAGNOSIS — Z83.438 FAMILY HISTORY OF HYPERLIPIDEMIA: ICD-10-CM

## 2025-05-01 DIAGNOSIS — Z00.00 WELLNESS EXAMINATION: Primary | ICD-10-CM

## 2025-05-01 DIAGNOSIS — R73.01 ELEVATED FASTING GLUCOSE: ICD-10-CM

## 2025-05-01 DIAGNOSIS — M05.79 RHEUMATOID ARTHRITIS WITH RHEUMATOID FACTOR OF MULTIPLE SITES WITHOUT ORGAN OR SYSTEMS INVOLVEMENT: ICD-10-CM

## 2025-05-01 DIAGNOSIS — R23.2 HOT FLASHES: ICD-10-CM

## 2025-05-01 PROCEDURE — 3044F HG A1C LEVEL LT 7.0%: CPT | Mod: CPTII,,, | Performed by: INTERNAL MEDICINE

## 2025-05-01 PROCEDURE — 3078F DIAST BP <80 MM HG: CPT | Mod: CPTII,,, | Performed by: INTERNAL MEDICINE

## 2025-05-01 PROCEDURE — 3008F BODY MASS INDEX DOCD: CPT | Mod: CPTII,,, | Performed by: INTERNAL MEDICINE

## 2025-05-01 PROCEDURE — 1160F RVW MEDS BY RX/DR IN RCRD: CPT | Mod: CPTII,,, | Performed by: INTERNAL MEDICINE

## 2025-05-01 PROCEDURE — 4010F ACE/ARB THERAPY RXD/TAKEN: CPT | Mod: CPTII,,, | Performed by: INTERNAL MEDICINE

## 2025-05-01 PROCEDURE — 99396 PREV VISIT EST AGE 40-64: CPT | Mod: ,,, | Performed by: INTERNAL MEDICINE

## 2025-05-01 PROCEDURE — 3074F SYST BP LT 130 MM HG: CPT | Mod: CPTII,,, | Performed by: INTERNAL MEDICINE

## 2025-05-01 PROCEDURE — 3066F NEPHROPATHY DOC TX: CPT | Mod: CPTII,,, | Performed by: INTERNAL MEDICINE

## 2025-05-01 PROCEDURE — 1159F MED LIST DOCD IN RCRD: CPT | Mod: CPTII,,, | Performed by: INTERNAL MEDICINE

## 2025-05-01 RX ORDER — TENAPANOR HYDROCHLORIDE 53.2 MG/1
50 TABLET ORAL 2 TIMES DAILY
Qty: 60 TABLET | Refills: 5 | Status: SHIPPED | OUTPATIENT
Start: 2025-05-01

## 2025-05-01 RX ORDER — SEMAGLUTIDE 0.25 MG/.5ML
0.25 INJECTION, SOLUTION SUBCUTANEOUS
Qty: 2 ML | Refills: 0 | Status: SHIPPED | OUTPATIENT
Start: 2025-05-01

## 2025-05-01 RX ORDER — TIRZEPATIDE 2.5 MG/.5ML
2.5 INJECTION, SOLUTION SUBCUTANEOUS
Qty: 2 ML | Refills: 0 | Status: SHIPPED | OUTPATIENT
Start: 2025-05-01 | End: 2025-05-01

## 2025-05-01 NOTE — PROGRESS NOTES
"Subjective:      Patient ID: Maria E Lopez is a 49 y.o. female.    Chief Complaint: Follow-up (Wellness)    HPI    Patient is here today for wellness  Status post L knee partial knee replacement with Dr. Kingsley in Feb 2025, reports still having a lot of pain, she is exercising, feels like swelling is just taking longer to heal. Frustrated with weight, says she has a scale that has noted fat loss and muscle gain but frustrated that the numbers are not improving, also having hot flashes, on progestone only IUD, wants to see GYN about this. Still with constipation as well.      Health Maintenance Due   Topic Date Due    Shingles Vaccine (1 of 2) Never done    Pneumococcal Vaccines (Age 50+) (1 of 2 - PCV) Never done    COVID-19 Vaccine (3 - Moderna risk series) 01/18/2022    Mammogram  04/04/2025     Review of Systems   Gastrointestinal:  Positive for constipation.   Musculoskeletal:  Positive for arthralgias.       Objective:     Vitals:    05/01/25 0925   BP: 106/78   BP Location: Left arm   Patient Position: Sitting   Pulse: 84   Resp: 16   Temp: 97.5 °F (36.4 °C)   TempSrc: Oral   SpO2: 96%   Height: 5' 6" (1.676 m)     Physical Exam  Vitals and nursing note reviewed.   Constitutional:       General: She is not in acute distress.     Appearance: She is not ill-appearing.   HENT:      Head: Normocephalic and atraumatic.   Cardiovascular:      Rate and Rhythm: Normal rate and regular rhythm.   Pulmonary:      Effort: Pulmonary effort is normal. No respiratory distress.      Breath sounds: Normal breath sounds. No wheezing.   Abdominal:      General: Abdomen is flat. There is no distension.      Palpations: Abdomen is soft.      Tenderness: There is no abdominal tenderness. There is no guarding.   Musculoskeletal:      Cervical back: Neck supple.      Right lower leg: No edema.      Left lower leg: No edema.   Neurological:      Mental Status: She is alert.       Assessment/Plan:     1. Wellness " examination  -     Lipid Panel; Future; Expected date: 05/01/2025  -     Hemoglobin A1C; Future; Expected date: 05/01/2025  -     TSH; Future; Expected date: 05/01/2025  -     Testosterone; Future; Expected date: 05/01/2025  -     Apolipoprotein B; Future; Expected date: 10/01/2025  -     Estradiol; Future; Expected date: 05/01/2025    2. Encounter for screening mammogram for breast cancer  -     Mammo Digital Screening Bilat w/ Alfredo (XPD); Future; Expected date: 05/01/2025    3. Cervical cancer screening  -     Ambulatory referral/consult to Gynecology; Future; Expected date: 05/08/2025    4. Encounter for lipid screening for cardiovascular disease  -     Lipid Panel; Future; Expected date: 05/01/2025  -     Hemoglobin A1C; Future; Expected date: 05/01/2025  -     TSH; Future; Expected date: 05/01/2025  -     Testosterone; Future; Expected date: 05/01/2025  -     Apolipoprotein B; Future; Expected date: 10/01/2025  -     Estradiol; Future; Expected date: 05/01/2025    5. Acquired hypothyroidism  -     Lipid Panel; Future; Expected date: 05/01/2025  -     Hemoglobin A1C; Future; Expected date: 05/01/2025  -     TSH; Future; Expected date: 05/01/2025  -     Testosterone; Future; Expected date: 05/01/2025  -     Apolipoprotein B; Future; Expected date: 10/01/2025  -     Estradiol; Future; Expected date: 05/01/2025  Stable  Check tsh  Continue levothyroxine  6. Family history of hyperlipidemia  -     Lipid Panel; Future; Expected date: 05/01/2025  -     Hemoglobin A1C; Future; Expected date: 05/01/2025  -     TSH; Future; Expected date: 05/01/2025  -     Testosterone; Future; Expected date: 05/01/2025  -     Apolipoprotein B; Future; Expected date: 10/01/2025  -     Estradiol; Future; Expected date: 05/01/2025  Check lipid panel, apo E  7. Hot flashes  -     Lipid Panel; Future; Expected date: 05/01/2025  -     Hemoglobin A1C; Future; Expected date: 05/01/2025  -     TSH; Future; Expected date: 05/01/2025  -      Testosterone; Future; Expected date: 05/01/2025  -     Apolipoprotein B; Future; Expected date: 10/01/2025  -     Estradiol; Future; Expected date: 05/01/2025  -     Sex Hormone Binding Globulin; Future; Expected date: 05/01/2025  Check SHBG, estradiol, tesosterone  Refer to GYN to discuss if patient would benefit from estrogen/progesterone therapy versus only progesterone IUD    8. Elevated fasting glucose  -     Lipid Panel; Future; Expected date: 05/01/2025  -     Hemoglobin A1C; Future; Expected date: 05/01/2025  -     TSH; Future; Expected date: 05/01/2025  -     Testosterone; Future; Expected date: 05/01/2025  -     Apolipoprotein B; Future; Expected date: 10/01/2025  -     Estradiol; Future; Expected date: 05/01/2025    9. Severe obesity (BMI 35.0-39.9) with comorbidity  -     tirzepatide, weight loss, (ZEPBOUND) 2.5 mg/0.5 mL Soln; Inject 0.5 mLs (2.5 mg total) into the skin every 7 days.  Dispense: 2 mL; Refill: 0  Comorbidity is HLD  BMI reviewed  Weight loss advised  Rx ordered Zepbound  Medication side effects reviewed including but not limited to pancreatitis, constipation, nausea/vomiting, upset stomach, thyroid cancer and adrenal tumors, symptoms and signs of pancreatitis reviewed  Discussed that this is a long term medication, when we stop this medication weight gain is most likely and that patient needs to focus on controlling caloric intake, reducing carbohydrate portions, focus more on adequate protein and fiber intake and staying well hydrated    10. Prediabetes  -     tirzepatide, weight loss, (ZEPBOUND) 2.5 mg/0.5 mL Soln; Inject 0.5 mLs (2.5 mg total) into the skin every 7 days.  Dispense: 2 mL; Refill: 0  Diagnosis and low carb diet advised  Continue exercise  Rx ordered  Medication side effects reviewed including but not limited to pancreatitis, constipation, nausea/vomiting, upset stomach, thyroid cancer and adrenal tumors, symptoms and signs of pancreatitis reviewed  Discussed that this is  a long term medication, when we stop this medication weight gain is most likely and that patient needs to focus on controlling caloric intake, reducing carbohydrate portions, focus more on adequate protein and fiber intake and staying well hydrated    11. Primary osteoarthritis of left knee  -     tirzepatide, weight loss, (ZEPBOUND) 2.5 mg/0.5 mL Soln; Inject 0.5 mLs (2.5 mg total) into the skin every 7 days.  Dispense: 2 mL; Refill: 0  Stable  Continue working on strength, mobility  12. Rheumatoid arthritis with rheumatoid factor of multiple sites without organ or systems involvement  Stable sx are controlled per patient in remission  She is not currently on DMARD therapy  F/u with rheumatology     Follow up in about 6 months (around 11/1/2025) for Follow Up - Chronic Conditions.    I spent a total of 30 minutes on the day of the visit.This includes face to face time and non-face to face time preparing to see the patient (eg, review of tests), obtaining and/or reviewing separately obtained history, documenting clinical information in the electronic or other health record, independently interpreting results and communicating results to the patient/family/caregiver, or care coordinator.

## 2025-05-07 ENCOUNTER — HOSPITAL ENCOUNTER (OUTPATIENT)
Dept: RADIOLOGY | Facility: HOSPITAL | Age: 50
Discharge: HOME | End: 2025-05-07
Attending: INTERNAL MEDICINE
Payer: COMMERCIAL

## 2025-05-07 DIAGNOSIS — Z12.31 ENCOUNTER FOR SCREENING MAMMOGRAM FOR BREAST CANCER: ICD-10-CM

## 2025-05-07 PROCEDURE — 77063 BREAST TOMOSYNTHESIS BI: CPT | Mod: TC

## 2025-05-12 ENCOUNTER — TELEPHONE (OUTPATIENT)
Dept: RADIOLOGY | Facility: HOSPITAL | Age: 50
End: 2025-05-12
Payer: COMMERCIAL

## 2025-05-12 ENCOUNTER — PATIENT MESSAGE (OUTPATIENT)
Dept: ORTHOPEDICS | Facility: CLINIC | Age: 50
End: 2025-05-12
Payer: COMMERCIAL

## 2025-05-13 ENCOUNTER — PATIENT MESSAGE (OUTPATIENT)
Dept: ORTHOPEDICS | Facility: CLINIC | Age: 50
End: 2025-05-13
Payer: COMMERCIAL

## 2025-05-18 DIAGNOSIS — E78.5 HYPERLIPIDEMIA, UNSPECIFIED HYPERLIPIDEMIA TYPE: ICD-10-CM

## 2025-05-19 RX ORDER — ROSUVASTATIN CALCIUM 20 MG/1
20 TABLET, COATED ORAL
Qty: 90 TABLET | Refills: 3 | Status: SHIPPED | OUTPATIENT
Start: 2025-05-19

## 2025-05-21 ENCOUNTER — TELEPHONE (OUTPATIENT)
Dept: FAMILY MEDICINE | Facility: CLINIC | Age: 50
End: 2025-05-21
Payer: COMMERCIAL

## 2025-05-21 DIAGNOSIS — N95.1 PERIMENOPAUSE: Primary | ICD-10-CM

## 2025-05-22 ENCOUNTER — HOSPITAL ENCOUNTER (OUTPATIENT)
Dept: RADIOLOGY | Facility: HOSPITAL | Age: 50
Discharge: HOME | End: 2025-05-22
Attending: INTERNAL MEDICINE
Payer: COMMERCIAL

## 2025-05-22 DIAGNOSIS — R92.8 ABNORMAL SCREENING MAMMOGRAM: ICD-10-CM

## 2025-05-22 PROCEDURE — 77065 DX MAMMO INCL CAD UNI: CPT | Mod: 26,LT,, | Performed by: RADIOLOGY

## 2025-05-22 PROCEDURE — 77061 BREAST TOMOSYNTHESIS UNI: CPT | Mod: TC,LT

## 2025-05-22 PROCEDURE — 77061 BREAST TOMOSYNTHESIS UNI: CPT | Mod: 26,LT,, | Performed by: RADIOLOGY

## 2025-05-22 NOTE — TELEPHONE ENCOUNTER
I have signed for the following orders AND/OR meds. Please notify the patient and ask the patient to schedule the testing and/or information about any medications that were sent.         Orders Placed This Encounter   Procedures    Ambulatory referral/consult to Gynecology     Standing Status:   Future     Expected Date:   5/29/2025     Expiration Date:   6/22/2026     Referral Priority:   Routine     Referral Type:   Consultation     Referral Reason:   Specialty Services Required     Requested Specialty:   Gynecology     Number of Visits Requested:   1

## 2025-05-26 ENCOUNTER — RESULTS FOLLOW-UP (OUTPATIENT)
Dept: FAMILY MEDICINE | Facility: CLINIC | Age: 50
End: 2025-05-26

## 2025-07-14 DIAGNOSIS — I10 HYPERTENSION, UNSPECIFIED TYPE: ICD-10-CM

## 2025-07-14 DIAGNOSIS — E03.9 ACQUIRED HYPOTHYROIDISM: ICD-10-CM

## 2025-07-14 DIAGNOSIS — E03.9 HYPOTHYROIDISM, UNSPECIFIED TYPE: ICD-10-CM

## 2025-07-14 RX ORDER — LEVOTHYROXINE SODIUM 100 UG/1
100 TABLET ORAL
Qty: 90 TABLET | Refills: 0 | Status: SHIPPED | OUTPATIENT
Start: 2025-07-14

## 2025-07-30 ENCOUNTER — LAB VISIT (OUTPATIENT)
Dept: LAB | Facility: HOSPITAL | Age: 50
End: 2025-07-30
Attending: INTERNAL MEDICINE
Payer: COMMERCIAL

## 2025-07-30 DIAGNOSIS — Z00.00 WELLNESS EXAMINATION: ICD-10-CM

## 2025-07-30 DIAGNOSIS — Z13.6 ENCOUNTER FOR LIPID SCREENING FOR CARDIOVASCULAR DISEASE: ICD-10-CM

## 2025-07-30 DIAGNOSIS — Z83.438 FAMILY HISTORY OF HYPERLIPIDEMIA: ICD-10-CM

## 2025-07-30 DIAGNOSIS — E03.9 ACQUIRED HYPOTHYROIDISM: ICD-10-CM

## 2025-07-30 DIAGNOSIS — R73.01 ELEVATED FASTING GLUCOSE: ICD-10-CM

## 2025-07-30 DIAGNOSIS — Z13.220 ENCOUNTER FOR LIPID SCREENING FOR CARDIOVASCULAR DISEASE: ICD-10-CM

## 2025-07-30 DIAGNOSIS — R23.2 HOT FLASHES: ICD-10-CM

## 2025-07-30 LAB
CHOLEST SERPL-MCNC: 122 MG/DL
CHOLEST/HDLC SERPL: 3 {RATIO} (ref 0–5)
EST. AVERAGE GLUCOSE BLD GHB EST-MCNC: 116.9 MG/DL
ESTRADIOL SERPL HS-MCNC: <24 PG/ML
HBA1C MFR BLD: 5.7 %
HDLC SERPL-MCNC: 43 MG/DL (ref 35–60)
LDLC SERPL CALC-MCNC: 69 MG/DL (ref 50–140)
TESTOST SERPL-MCNC: <12.98 NG/DL (ref 12.4–35.75)
TRIGL SERPL-MCNC: 52 MG/DL (ref 37–140)
TSH SERPL-ACNC: 0.09 UIU/ML (ref 0.35–4.94)
VLDLC SERPL CALC-MCNC: 10 MG/DL

## 2025-07-30 PROCEDURE — 84270 ASSAY OF SEX HORMONE GLOBUL: CPT

## 2025-07-30 PROCEDURE — 82670 ASSAY OF TOTAL ESTRADIOL: CPT

## 2025-07-30 PROCEDURE — 84403 ASSAY OF TOTAL TESTOSTERONE: CPT

## 2025-07-30 PROCEDURE — 80061 LIPID PANEL: CPT

## 2025-07-30 PROCEDURE — 36415 COLL VENOUS BLD VENIPUNCTURE: CPT

## 2025-07-30 PROCEDURE — 84443 ASSAY THYROID STIM HORMONE: CPT

## 2025-07-30 PROCEDURE — 83036 HEMOGLOBIN GLYCOSYLATED A1C: CPT

## 2025-07-31 LAB — SHBG SERPL-SCNC: 42.7 NMOL/L (ref 16.8–125.2)

## (undated) DEVICE — SUT BLK MONO 3-0 CUT 18IN F

## (undated) DEVICE — SUT STRATAFIX PDS+ 1 CTX 24IN

## (undated) DEVICE — COVER SHOE LG KNEE HI WTRPRF

## (undated) DEVICE — SOL NORMAL USPCA 0.9%

## (undated) DEVICE — DRAPE FULL SHEET 70X100IN

## (undated) DEVICE — KIT VIZADISC KNEE TRACKING

## (undated) DEVICE — SPONGE COTTON TRAY 4X4IN

## (undated) DEVICE — GOWN POLY REINF X-LONG 2XL

## (undated) DEVICE — GLOVE SENSICARE PI MICRO 8

## (undated) DEVICE — PIN FIXATION BONE 140X3.2MM
Type: IMPLANTABLE DEVICE | Site: KNEE | Status: NON-FUNCTIONAL
Removed: 2025-02-25

## (undated) DEVICE — SYR 10CC LUER LOCK

## (undated) DEVICE — PAD PREP CUFFED NS 24X48IN

## (undated) DEVICE — KIT SURGICAL TURNOVER

## (undated) DEVICE — CUSHION  WC FOAM 20X20X.75IN

## (undated) DEVICE — SUPPORT ULNA NERVE PROTECTOR

## (undated) DEVICE — PADDING WYTEX UNDRCST 6INX4YD

## (undated) DEVICE — PENCIL SMOKE EVAC TELSCP 15FT

## (undated) DEVICE — PACK SURGICAL KNEE SCOPE

## (undated) DEVICE — BLADE SURG STAINLESS STEEL #15

## (undated) DEVICE — SOL NACL IRR 3000ML

## (undated) DEVICE — CUFF ATS 2 PORT SNGL BLDR 34IN

## (undated) DEVICE — DRAPE INCISE IOBAN 2 13X13IN

## (undated) DEVICE — KIT POSITIONING KNEE

## (undated) DEVICE — GLOVE SENSICARE PI GRN 8

## (undated) DEVICE — BLADE MAKO NARROW

## (undated) DEVICE — TUBE SET INFLOW/OUTFLOW

## (undated) DEVICE — WRAP DEMAYO LEG STERILE

## (undated) DEVICE — PAD ABDOMINAL STERILE 8X10IN

## (undated) DEVICE — POSITIONER HEAD ADULT

## (undated) DEVICE — SYS IRRISEPT 450ML0.05% CHG

## (undated) DEVICE — SOL NACL .9P 500ML

## (undated) DEVICE — DRESSING N ADH OIL EMUL 3X3

## (undated) DEVICE — SUT VICRYL 2-0 36 CT-1

## (undated) DEVICE — COVER TABLE HVY DTY 60X90IN

## (undated) DEVICE — Device

## (undated) DEVICE — BLADE GREAT WHITE 4.2 6/BX

## (undated) DEVICE — KIT CHECKPOINT TIBIAL

## (undated) DEVICE — SUT ETHIBOND XTRA 1 CTX

## (undated) DEVICE — DRESSING N ADH OIL EMUL 3X8 3S

## (undated) DEVICE — BLADE TONGUE DEPRESSOR AD 6IN

## (undated) DEVICE — APPLICATOR CHLORAPREP ORN 26ML

## (undated) DEVICE — TAPE SILK 3IN

## (undated) DEVICE — KIT DRAPE RIO ONE PIECE W/POCK

## (undated) DEVICE — PIN BONE 3.2X110MM
Type: IMPLANTABLE DEVICE | Site: KNEE | Status: NON-FUNCTIONAL
Removed: 2025-02-25

## (undated) DEVICE — CORD SILICONE RETRACTOR

## (undated) DEVICE — TUBING IRRIGATION RIO

## (undated) DEVICE — ELECTRODE PATIENT RETURN DISP

## (undated) DEVICE — DRAPE STERI U-SHAPED 47X51IN

## (undated) DEVICE — DRAPE MEDIUM SHEET 40X70IN